# Patient Record
Sex: MALE | Race: WHITE | NOT HISPANIC OR LATINO | URBAN - METROPOLITAN AREA
[De-identification: names, ages, dates, MRNs, and addresses within clinical notes are randomized per-mention and may not be internally consistent; named-entity substitution may affect disease eponyms.]

---

## 2022-09-20 ENCOUNTER — HOSPITAL ENCOUNTER (INPATIENT)
Facility: HOSPITAL | Age: 77
LOS: 11 days | Discharge: NURSING, REHAB OR LONG-TERM CARE | DRG: 055 | End: 2022-10-01
Attending: EMERGENCY MEDICINE | Admitting: HOSPITALIST
Payer: MEDICARE

## 2022-09-20 DIAGNOSIS — R07.9 CHEST PAIN: ICD-10-CM

## 2022-09-20 DIAGNOSIS — G93.89 BRAIN MASS: ICD-10-CM

## 2022-09-20 DIAGNOSIS — L30.8 DERMATITIS ASSOCIATED WITH MOISTURE: Primary | ICD-10-CM
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
## 2022-09-20 PROBLEM — G25.81 RESTLESS LEG: Status: ACTIVE | Noted: 2022-09-20

## 2022-09-20 PROBLEM — I10 HYPERTENSION: Status: ACTIVE | Noted: 2022-09-20

## 2022-09-20 PROBLEM — Z95.1 HX OF CABG: Status: ACTIVE | Noted: 2022-09-20

## 2022-09-20 PROBLEM — M54.9 CHRONIC BACK PAIN: Status: ACTIVE | Noted: 2022-09-20

## 2022-09-20 PROBLEM — G47.33 OSA ON CPAP: Status: ACTIVE | Noted: 2022-09-20

## 2022-09-20 PROBLEM — I25.810 CORONARY ARTERY DISEASE INVOLVING CORONARY BYPASS GRAFT: Status: ACTIVE | Noted: 2022-09-20

## 2022-09-20 PROBLEM — I48.91 ATRIAL FIBRILLATION: Status: ACTIVE | Noted: 2022-09-20

## 2022-09-20 PROBLEM — J44.1 COPD EXACERBATION: Status: ACTIVE | Noted: 2022-09-20

## 2022-09-20 PROBLEM — E78.5 HYPERLIPIDEMIA: Status: ACTIVE | Noted: 2022-09-20

## 2022-09-20 PROBLEM — G89.29 CHRONIC BACK PAIN: Status: ACTIVE | Noted: 2022-09-20

## 2022-09-20 PROBLEM — K21.9 GERD (GASTROESOPHAGEAL REFLUX DISEASE): Status: ACTIVE | Noted: 2022-09-20

## 2022-09-20 PROBLEM — E11.9 TYPE 2 DIABETES MELLITUS, WITHOUT LONG-TERM CURRENT USE OF INSULIN: Status: ACTIVE | Noted: 2022-09-20

## 2022-09-20 PROBLEM — D12.6 ADENOMATOUS POLYP OF COLON: Status: ACTIVE | Noted: 2022-09-20

## 2022-09-20 PROBLEM — Z95.0 PACEMAKER: Status: ACTIVE | Noted: 2022-09-20

## 2022-09-20 PROBLEM — J44.9 COPD (CHRONIC OBSTRUCTIVE PULMONARY DISEASE): Status: ACTIVE | Noted: 2022-09-20

## 2022-09-20 LAB
ALBUMIN SERPL BCP-MCNC: 3.9 G/DL (ref 3.5–5.2)
ALP SERPL-CCNC: 46 U/L (ref 55–135)
ALT SERPL W/O P-5'-P-CCNC: 11 U/L (ref 10–44)
ANION GAP SERPL CALC-SCNC: 9 MMOL/L (ref 8–16)
APTT BLDCRRT: 37.3 SEC (ref 21–32)
AST SERPL-CCNC: 12 U/L (ref 10–40)
BASOPHILS # BLD AUTO: 0.02 K/UL (ref 0–0.2)
BASOPHILS NFR BLD: 0.2 % (ref 0–1.9)
BILIRUB SERPL-MCNC: 0.7 MG/DL (ref 0.1–1)
BUN SERPL-MCNC: 13 MG/DL (ref 8–23)
CALCIUM SERPL-MCNC: 9.5 MG/DL (ref 8.7–10.5)
CHLORIDE SERPL-SCNC: 102 MMOL/L (ref 95–110)
CO2 SERPL-SCNC: 29 MMOL/L (ref 23–29)
CREAT SERPL-MCNC: 0.8 MG/DL (ref 0.5–1.4)
DIFFERENTIAL METHOD: ABNORMAL
EOSINOPHIL # BLD AUTO: 0.2 K/UL (ref 0–0.5)
EOSINOPHIL NFR BLD: 1.7 % (ref 0–8)
ERYTHROCYTE [DISTWIDTH] IN BLOOD BY AUTOMATED COUNT: 16.7 % (ref 11.5–14.5)
EST. GFR  (NO RACE VARIABLE): >60 ML/MIN/1.73 M^2
GLUCOSE SERPL-MCNC: 112 MG/DL (ref 70–110)
HCT VFR BLD AUTO: 39.5 % (ref 40–54)
HCV AB SERPL QL IA: NORMAL
HGB BLD-MCNC: 12.9 G/DL (ref 14–18)
HIV 1+2 AB+HIV1 P24 AG SERPL QL IA: NORMAL
IMM GRANULOCYTES # BLD AUTO: 0.04 K/UL (ref 0–0.04)
IMM GRANULOCYTES NFR BLD AUTO: 0.4 % (ref 0–0.5)
INR PPP: 2.5 (ref 0.8–1.2)
LYMPHOCYTES # BLD AUTO: 1.4 K/UL (ref 1–4.8)
LYMPHOCYTES NFR BLD: 15.8 % (ref 18–48)
MCH RBC QN AUTO: 30 PG (ref 27–31)
MCHC RBC AUTO-ENTMCNC: 32.7 G/DL (ref 32–36)
MCV RBC AUTO: 92 FL (ref 82–98)
MONOCYTES # BLD AUTO: 0.7 K/UL (ref 0.3–1)
MONOCYTES NFR BLD: 8.1 % (ref 4–15)
NEUTROPHILS # BLD AUTO: 6.7 K/UL (ref 1.8–7.7)
NEUTROPHILS NFR BLD: 73.8 % (ref 38–73)
NRBC BLD-RTO: 0 /100 WBC
PLATELET # BLD AUTO: 165 K/UL (ref 150–450)
PMV BLD AUTO: 11.7 FL (ref 9.2–12.9)
POCT GLUCOSE: 113 MG/DL (ref 70–110)
POTASSIUM SERPL-SCNC: 3.4 MMOL/L (ref 3.5–5.1)
PROT SERPL-MCNC: 6.9 G/DL (ref 6–8.4)
PROTHROMBIN TIME: 24.9 SEC (ref 9–12.5)
RBC # BLD AUTO: 4.3 M/UL (ref 4.6–6.2)
SODIUM SERPL-SCNC: 140 MMOL/L (ref 136–145)
WBC # BLD AUTO: 9.06 K/UL (ref 3.9–12.7)

## 2022-09-20 PROCEDURE — 81003 URINALYSIS AUTO W/O SCOPE: CPT

## 2022-09-20 PROCEDURE — 80053 COMPREHEN METABOLIC PANEL: CPT | Performed by: EMERGENCY MEDICINE

## 2022-09-20 PROCEDURE — 99285 EMERGENCY DEPT VISIT HI MDM: CPT | Mod: 25

## 2022-09-20 PROCEDURE — 86803 HEPATITIS C AB TEST: CPT | Performed by: PHYSICIAN ASSISTANT

## 2022-09-20 PROCEDURE — 25500020 PHARM REV CODE 255: Performed by: HOSPITALIST

## 2022-09-20 PROCEDURE — 99223 1ST HOSP IP/OBS HIGH 75: CPT | Mod: ,,, | Performed by: NEUROLOGICAL SURGERY

## 2022-09-20 PROCEDURE — 99223 PR INITIAL HOSPITAL CARE,LEVL III: ICD-10-PCS | Mod: ,,, | Performed by: NEUROLOGICAL SURGERY

## 2022-09-20 PROCEDURE — 85610 PROTHROMBIN TIME: CPT | Performed by: EMERGENCY MEDICINE

## 2022-09-20 PROCEDURE — 85025 COMPLETE CBC W/AUTO DIFF WBC: CPT | Performed by: EMERGENCY MEDICINE

## 2022-09-20 PROCEDURE — 87389 HIV-1 AG W/HIV-1&-2 AB AG IA: CPT | Performed by: PHYSICIAN ASSISTANT

## 2022-09-20 PROCEDURE — 83036 HEMOGLOBIN GLYCOSYLATED A1C: CPT | Performed by: EMERGENCY MEDICINE

## 2022-09-20 PROCEDURE — 12000002 HC ACUTE/MED SURGE SEMI-PRIVATE ROOM

## 2022-09-20 PROCEDURE — 93010 EKG 12-LEAD: ICD-10-PCS | Mod: ,,, | Performed by: INTERNAL MEDICINE

## 2022-09-20 PROCEDURE — 85730 THROMBOPLASTIN TIME PARTIAL: CPT | Performed by: EMERGENCY MEDICINE

## 2022-09-20 PROCEDURE — 99285 EMERGENCY DEPT VISIT HI MDM: CPT | Mod: ,,, | Performed by: EMERGENCY MEDICINE

## 2022-09-20 PROCEDURE — 25000003 PHARM REV CODE 250

## 2022-09-20 PROCEDURE — 99285 PR EMERGENCY DEPT VISIT,LEVEL V: ICD-10-PCS | Mod: ,,, | Performed by: EMERGENCY MEDICINE

## 2022-09-20 PROCEDURE — 93010 ELECTROCARDIOGRAM REPORT: CPT | Mod: ,,, | Performed by: INTERNAL MEDICINE

## 2022-09-20 PROCEDURE — 93005 ELECTROCARDIOGRAM TRACING: CPT

## 2022-09-20 RX ORDER — MAG HYDROX/ALUMINUM HYD/SIMETH 200-200-20
30 SUSPENSION, ORAL (FINAL DOSE FORM) ORAL 4 TIMES DAILY PRN
Status: DISCONTINUED | OUTPATIENT
Start: 2022-09-20 | End: 2022-10-01 | Stop reason: HOSPADM

## 2022-09-20 RX ORDER — POTASSIUM CHLORIDE 20 MEQ/1
40 TABLET, EXTENDED RELEASE ORAL ONCE
Status: COMPLETED | OUTPATIENT
Start: 2022-09-20 | End: 2022-09-20

## 2022-09-20 RX ORDER — ATORVASTATIN CALCIUM 20 MG/1
40 TABLET, FILM COATED ORAL NIGHTLY
Status: DISCONTINUED | OUTPATIENT
Start: 2022-09-20 | End: 2022-10-01 | Stop reason: HOSPADM

## 2022-09-20 RX ORDER — ACETAMINOPHEN 325 MG/1
650 TABLET ORAL EVERY 4 HOURS PRN
Status: DISCONTINUED | OUTPATIENT
Start: 2022-09-20 | End: 2022-09-22

## 2022-09-20 RX ORDER — IPRATROPIUM BROMIDE AND ALBUTEROL SULFATE 2.5; .5 MG/3ML; MG/3ML
3 SOLUTION RESPIRATORY (INHALATION) EVERY 6 HOURS PRN
Status: DISCONTINUED | OUTPATIENT
Start: 2022-09-20 | End: 2022-10-01 | Stop reason: HOSPADM

## 2022-09-20 RX ORDER — SODIUM CHLORIDE 0.9 % (FLUSH) 0.9 %
10 SYRINGE (ML) INJECTION
Status: DISCONTINUED | OUTPATIENT
Start: 2022-09-20 | End: 2022-10-01 | Stop reason: HOSPADM

## 2022-09-20 RX ORDER — IBUPROFEN 200 MG
1 TABLET ORAL DAILY
Status: DISCONTINUED | OUTPATIENT
Start: 2022-09-21 | End: 2022-10-01 | Stop reason: HOSPADM

## 2022-09-20 RX ORDER — AMOXICILLIN 250 MG
1 CAPSULE ORAL 2 TIMES DAILY PRN
Status: DISCONTINUED | OUTPATIENT
Start: 2022-09-20 | End: 2022-09-25

## 2022-09-20 RX ORDER — IBUPROFEN 200 MG
24 TABLET ORAL
Status: DISCONTINUED | OUTPATIENT
Start: 2022-09-20 | End: 2022-10-01 | Stop reason: HOSPADM

## 2022-09-20 RX ORDER — PANTOPRAZOLE SODIUM 40 MG/1
40 TABLET, DELAYED RELEASE ORAL DAILY
Status: DISCONTINUED | OUTPATIENT
Start: 2022-09-21 | End: 2022-10-01 | Stop reason: HOSPADM

## 2022-09-20 RX ORDER — ATENOLOL 50 MG/1
50 TABLET ORAL DAILY
Status: DISCONTINUED | OUTPATIENT
Start: 2022-09-21 | End: 2022-10-01 | Stop reason: HOSPADM

## 2022-09-20 RX ORDER — ASPIRIN 81 MG/1
81 TABLET ORAL DAILY
Status: DISCONTINUED | OUTPATIENT
Start: 2022-09-21 | End: 2022-10-01 | Stop reason: HOSPADM

## 2022-09-20 RX ORDER — SODIUM CHLORIDE 0.9 % (FLUSH) 0.9 %
10 SYRINGE (ML) INJECTION EVERY 12 HOURS PRN
Status: DISCONTINUED | OUTPATIENT
Start: 2022-09-20 | End: 2022-10-01 | Stop reason: HOSPADM

## 2022-09-20 RX ORDER — FLUTICASONE FUROATE AND VILANTEROL 100; 25 UG/1; UG/1
1 POWDER RESPIRATORY (INHALATION) DAILY
Status: DISCONTINUED | OUTPATIENT
Start: 2022-09-21 | End: 2022-10-01 | Stop reason: HOSPADM

## 2022-09-20 RX ORDER — FUROSEMIDE 20 MG/1
20 TABLET ORAL DAILY
Status: DISCONTINUED | OUTPATIENT
Start: 2022-09-21 | End: 2022-10-01 | Stop reason: HOSPADM

## 2022-09-20 RX ORDER — ROPINIROLE 1 MG/1
1 TABLET, FILM COATED ORAL NIGHTLY
Status: DISCONTINUED | OUTPATIENT
Start: 2022-09-20 | End: 2022-10-01 | Stop reason: HOSPADM

## 2022-09-20 RX ORDER — INSULIN ASPART 100 [IU]/ML
1-10 INJECTION, SOLUTION INTRAVENOUS; SUBCUTANEOUS
Status: DISCONTINUED | OUTPATIENT
Start: 2022-09-20 | End: 2022-10-01 | Stop reason: HOSPADM

## 2022-09-20 RX ORDER — GLUCAGON 1 MG
1 KIT INJECTION
Status: DISCONTINUED | OUTPATIENT
Start: 2022-09-20 | End: 2022-10-01 | Stop reason: HOSPADM

## 2022-09-20 RX ORDER — LOSARTAN POTASSIUM AND HYDROCHLOROTHIAZIDE 25; 100 MG/1; MG/1
1 TABLET ORAL DAILY
Status: DISCONTINUED | OUTPATIENT
Start: 2022-09-21 | End: 2022-09-30

## 2022-09-20 RX ORDER — NALOXONE HCL 0.4 MG/ML
0.02 VIAL (ML) INJECTION
Status: DISCONTINUED | OUTPATIENT
Start: 2022-09-20 | End: 2022-10-01 | Stop reason: HOSPADM

## 2022-09-20 RX ORDER — POLYETHYLENE GLYCOL 3350 17 G/17G
17 POWDER, FOR SOLUTION ORAL 2 TIMES DAILY PRN
Status: DISCONTINUED | OUTPATIENT
Start: 2022-09-20 | End: 2022-09-25

## 2022-09-20 RX ORDER — WARFARIN SODIUM 5 MG/1
5 TABLET ORAL DAILY
Status: DISCONTINUED | OUTPATIENT
Start: 2022-09-21 | End: 2022-09-22

## 2022-09-20 RX ORDER — IBUPROFEN 200 MG
16 TABLET ORAL
Status: DISCONTINUED | OUTPATIENT
Start: 2022-09-20 | End: 2022-10-01 | Stop reason: HOSPADM

## 2022-09-20 RX ORDER — TALC
6 POWDER (GRAM) TOPICAL NIGHTLY PRN
Status: DISCONTINUED | OUTPATIENT
Start: 2022-09-20 | End: 2022-10-01 | Stop reason: HOSPADM

## 2022-09-20 RX ADMIN — IOHEXOL 100 ML: 350 INJECTION, SOLUTION INTRAVENOUS at 09:09

## 2022-09-20 RX ADMIN — INSULIN DETEMIR 5 UNITS: 100 INJECTION, SOLUTION SUBCUTANEOUS at 11:09

## 2022-09-20 RX ADMIN — POTASSIUM CHLORIDE 40 MEQ: 1500 TABLET, EXTENDED RELEASE ORAL at 09:09

## 2022-09-20 RX ADMIN — ATORVASTATIN CALCIUM 40 MG: 40 TABLET, FILM COATED ORAL at 11:09

## 2022-09-20 NOTE — ED PROVIDER NOTES
Encounter Date: 9/20/2022       History     Chief Complaint   Patient presents with    Fall     Transfer from [place], frequent falls. CT revealed brain mass      HPI  78 y/o M medical history of HTN, DM2, CAD s/p CABG, pacemaker and h/o polio presents as transfer from [place] for concern of brain mass. Pt states he has been having frequent falls over the past few weeks. Has been using a walker. This morning he fell again and was seen in ED at [facility] where head CT revealed concern for brain mass.   Pt has a complaint of headache but other wise feels well.  Has at baseline problems with mobility of his lt arm secondary to polio    Review of patient's allergies indicates:  No Known Allergies    Past Medical History:   Diagnosis Date    Coronary artery disease     Diabetes mellitus     GERD (gastroesophageal reflux disease)     Hypertension      Past Surgical History:   Procedure Laterality Date    APPENDECTOMY      CARDIAC SURGERY      JOINT REPLACEMENT       History reviewed. No pertinent family history.  Social History     Tobacco Use    Smoking status: Every Day     Packs/day: 1.00     Years: 30.00     Pack years: 30.00     Types: Cigarettes    Smokeless tobacco: Never   Substance Use Topics    Alcohol use: Never    Drug use: Never     Review of Systems   Constitutional:  Negative for fatigue and fever.   HENT:  Negative for sore throat.    Eyes:  Negative for visual disturbance.   Respiratory:  Negative for shortness of breath.    Cardiovascular:  Negative for chest pain and leg swelling.   Gastrointestinal:  Negative for abdominal pain, nausea and vomiting.   Genitourinary:  Negative for dysuria.   Musculoskeletal:  Negative for back pain and neck stiffness.   Skin:  Negative for rash.   Neurological:  Positive for weakness and headaches. Negative for dizziness, seizures and syncope.     Physical Exam     Initial Vitals [09/20/22 1456]   BP Pulse Resp Temp SpO2   (!) 148/79 71 20 97.8 °F (36.6 °C) 96 %       MAP       --         Physical Exam    Nursing note and vitals reviewed.  Constitutional: He appears well-developed and well-nourished. He is not diaphoretic. No distress.   HENT:   Head: Normocephalic and atraumatic.   Eyes: Conjunctivae are normal. Pupils are equal, round, and reactive to light.   Neck: Neck supple.   Cardiovascular:  Normal rate, regular rhythm, normal heart sounds and intact distal pulses.           Pulmonary/Chest: Breath sounds normal. No respiratory distress. He has no wheezes.   Well healed scar overlying pacemaker Rt chest wall   Abdominal: Abdomen is soft. He exhibits no distension. There is no abdominal tenderness. There is no rebound.   Musculoskeletal:         General: No tenderness or edema.      Cervical back: Neck supple.     Neurological: GCS score is 15. GCS eye subscore is 4. GCS verbal subscore is 5. GCS motor subscore is 6.   Alert, oriented to person, hospital, year and month  Moving all extremities, normal strength in bilateral LE   Skin: Skin is warm and dry.       ED Course   Procedures  Labs Reviewed   CBC W/ AUTO DIFFERENTIAL - Abnormal; Notable for the following components:       Result Value    RBC 4.30 (*)     Hemoglobin 12.9 (*)     Hematocrit 39.5 (*)     RDW 16.7 (*)     Gran % 73.8 (*)     Lymph % 15.8 (*)     All other components within normal limits   COMPREHENSIVE METABOLIC PANEL - Abnormal; Notable for the following components:    Potassium 3.4 (*)     Glucose 112 (*)     Alkaline Phosphatase 46 (*)     All other components within normal limits   PROTIME-INR - Abnormal; Notable for the following components:    Prothrombin Time 24.9 (*)     INR 2.5 (*)     All other components within normal limits   APTT - Abnormal; Notable for the following components:    aPTT 37.3 (*)     All other components within normal limits   POCT GLUCOSE - Abnormal; Notable for the following components:    POCT Glucose 113 (*)     All other components within normal limits   HIV 1 / 2  ANTIBODY    Narrative:     Release to patient->Immediate   HEPATITIS C ANTIBODY    Narrative:     Release to patient->Immediate   POCT GLUCOSE MONITORING CONTINUOUS     EKG Readings: (Independently Interpreted)   Aflutter rate 80, no acute ischemic changes     Imaging Results              CT Head Without Contrast (Final result)  Result time 09/20/22 18:08:13      Final result by Chinedu Burton MD (09/20/22 18:08:13)                   Impression:      1. No acute intracranial process.  2. Involutional changes with chronic microvascular ischemic changes.      Electronically signed by: Chinedu Burton  Date:    09/20/2022  Time:    18:08               Narrative:    EXAMINATION:  CT HEAD WITHOUT CONTRAST    CLINICAL HISTORY:  Headache, new or worsening (Age >= 50y);    TECHNIQUE:  Low dose axial CT images obtained throughout the head without intravenous contrast. Sagittal and coronal reconstructions were performed.    COMPARISON:  None.    FINDINGS:  Intracranial compartment:    Ventricles and sulci are normal in size for age without evidence of hydrocephalus. No extra-axial blood or fluid collections.    Moderate involutional changes and probable chronic microvascular ischemic changes in the periventricular white matter.    No parenchymal mass, hemorrhage, edema or major vascular distribution infarct.    Skull/extracranial contents (limited evaluation): No fracture. Mastoid air cells and paranasal sinuses are essentially clear.                                       X-Ray Chest 1 View (Final result)  Result time 09/20/22 17:21:39      Final result by Ezio Avila MD (09/20/22 17:21:39)                   Impression:      No acute cardiopulmonary process.    Electronically signed by resident: Osman Mcbride  Date:    09/20/2022  Time:    17:15    Electronically signed by: Ezio Avila MD  Date:    09/20/2022  Time:    17:21               Narrative:    EXAMINATION:  XR CHEST 1 VIEW    CLINICAL HISTORY:  Unspecified fall,  initial encounter    TECHNIQUE:  Single frontal view of the chest was performed.    COMPARISON:  None    FINDINGS:  Right chest pacemaker with 2 transvenous leads.    Mediastinal structures midline.  Cardiac silhouette normal.  Postoperative change of median sternotomy/CABG.    Lungs are symmetrically well expanded and clear.  No pleural fluid or pneumothorax.    No significant osseous abnormality.                                    X-Rays:   Independently Interpreted Readings:   Chest X-Ray: Normal heart size.  No infiltrates.  No acute abnormalities. Pacemaker rt chest wall     Medications - No data to display    Medical Decision Making:   History:   I obtained history from: someone other than patient.  Old Medical Records: I decided to obtain old medical records.  Initial Assessment:   78 y/o M with frequent falls and brain mass on CT  Routine blood work  Consult neurosurg  Discussed MRI but pt has pacemaker unclear if MR compatible  Independently Interpreted Test(s):   I have ordered and independently interpreted X-rays - see prior notes.  I have ordered and independently interpreted EKG Reading(s) - see prior notes  Clinical Tests:   Lab Tests: Ordered  Radiological Study: Ordered  Medical Tests: Ordered and Reviewed  ED Management:  Discussed with neurosurg. Feel likely metastasis. Will order CTchest/abd/pelvis and CT brain. Admit to medicine                        Clinical Impression:   Final diagnoses:  [W19.XXXA] Falls        ED Disposition Condition    Observation Stable                Kayli Stewart MD  09/22/22 3778

## 2022-09-20 NOTE — Clinical Note
Diagnosis: Brain mass [506238]   Admitting Provider:: MARCELINO ZHU [[id number]]   Future Attending Provider: MARCELINO ZHU [[id number]]   Reason for IP Medical Treatment  (Clinical interventions that can only be accomplished in the IP setting? ) :: further eval of brain mass   Estimated Length of Stay:: 2 midnights   I certify that Inpatient services for greater than or equal to 2 midnights are medically necessary:: Yes   Plans for Post-Acute care--if anticipated (pick the single best option):: H. Transfer to other Acute Care (Psych, another hospital, etc.)

## 2022-09-20 NOTE — ED TRIAGE NOTES
Pt transferred from [facility] ED for neurosurgery consult.  Pt was seen for frequent falls and was found to have a brain mass on CT scan.   Pt c/o headache at present which he states has been intermittent for a few weeks.  Pt also c/o left leg pain.

## 2022-09-20 NOTE — ED NOTES
Appearance:  Pt awake, alert & oriented to person, place & time.  Pt in no acute distress at present time.  Skin:  Skin warm, dry & intact.  Mucous membranes moist.  Skin turgor normal.  Respiratory:  Respirations even, non-labored.    Neurologic:  Pt moving all extremities without difficulty.  Sensation intact.     Peripheral Vascular:  All peripheral pulses present.  Abdomen:  Abdomen soft, non-tender to palpation.

## 2022-09-21 LAB
ALBUMIN SERPL BCP-MCNC: 3.8 G/DL (ref 3.5–5.2)
ALP SERPL-CCNC: 45 U/L (ref 55–135)
ALT SERPL W/O P-5'-P-CCNC: 8 U/L (ref 10–44)
ANION GAP SERPL CALC-SCNC: 11 MMOL/L (ref 8–16)
AST SERPL-CCNC: 12 U/L (ref 10–40)
BASOPHILS # BLD AUTO: 0.02 K/UL (ref 0–0.2)
BASOPHILS NFR BLD: 0.2 % (ref 0–1.9)
BILIRUB SERPL-MCNC: 1.1 MG/DL (ref 0.1–1)
BILIRUB UR QL STRIP: NEGATIVE
BUN SERPL-MCNC: 10 MG/DL (ref 8–23)
CALCIUM SERPL-MCNC: 9.3 MG/DL (ref 8.7–10.5)
CHLORIDE SERPL-SCNC: 102 MMOL/L (ref 95–110)
CLARITY UR REFRACT.AUTO: CLEAR
CO2 SERPL-SCNC: 24 MMOL/L (ref 23–29)
COLOR UR AUTO: YELLOW
CREAT SERPL-MCNC: 0.7 MG/DL (ref 0.5–1.4)
DIFFERENTIAL METHOD: ABNORMAL
EOSINOPHIL # BLD AUTO: 0.2 K/UL (ref 0–0.5)
EOSINOPHIL NFR BLD: 1.7 % (ref 0–8)
ERYTHROCYTE [DISTWIDTH] IN BLOOD BY AUTOMATED COUNT: 16.2 % (ref 11.5–14.5)
EST. GFR  (NO RACE VARIABLE): >60 ML/MIN/1.73 M^2
ESTIMATED AVG GLUCOSE: 146 MG/DL (ref 68–131)
GLUCOSE SERPL-MCNC: 108 MG/DL (ref 70–110)
GLUCOSE UR QL STRIP: NEGATIVE
HBA1C MFR BLD: 6.7 % (ref 4–5.6)
HCT VFR BLD AUTO: 39.1 % (ref 40–54)
HGB BLD-MCNC: 12.7 G/DL (ref 14–18)
HGB UR QL STRIP: NEGATIVE
IMM GRANULOCYTES # BLD AUTO: 0.02 K/UL (ref 0–0.04)
IMM GRANULOCYTES NFR BLD AUTO: 0.2 % (ref 0–0.5)
INR PPP: 2.5 (ref 0.8–1.2)
KETONES UR QL STRIP: ABNORMAL
LEUKOCYTE ESTERASE UR QL STRIP: NEGATIVE
LYMPHOCYTES # BLD AUTO: 1.3 K/UL (ref 1–4.8)
LYMPHOCYTES NFR BLD: 14.3 % (ref 18–48)
MAGNESIUM SERPL-MCNC: 1.6 MG/DL (ref 1.6–2.6)
MCH RBC QN AUTO: 29.5 PG (ref 27–31)
MCHC RBC AUTO-ENTMCNC: 32.5 G/DL (ref 32–36)
MCV RBC AUTO: 91 FL (ref 82–98)
MONOCYTES # BLD AUTO: 0.8 K/UL (ref 0.3–1)
MONOCYTES NFR BLD: 8.2 % (ref 4–15)
NEUTROPHILS # BLD AUTO: 6.9 K/UL (ref 1.8–7.7)
NEUTROPHILS NFR BLD: 75.4 % (ref 38–73)
NITRITE UR QL STRIP: NEGATIVE
NRBC BLD-RTO: 0 /100 WBC
PH UR STRIP: 8 [PH] (ref 5–8)
PHOSPHATE SERPL-MCNC: 3.2 MG/DL (ref 2.7–4.5)
PLATELET # BLD AUTO: 164 K/UL (ref 150–450)
PMV BLD AUTO: 11.6 FL (ref 9.2–12.9)
POCT GLUCOSE: 112 MG/DL (ref 70–110)
POCT GLUCOSE: 113 MG/DL (ref 70–110)
POCT GLUCOSE: 115 MG/DL (ref 70–110)
POCT GLUCOSE: 120 MG/DL (ref 70–110)
POCT GLUCOSE: 122 MG/DL (ref 70–110)
POCT GLUCOSE: 130 MG/DL (ref 70–110)
POTASSIUM SERPL-SCNC: 3.8 MMOL/L (ref 3.5–5.1)
PROT SERPL-MCNC: 6.7 G/DL (ref 6–8.4)
PROT UR QL STRIP: NEGATIVE
PROTHROMBIN TIME: 24.6 SEC (ref 9–12.5)
RBC # BLD AUTO: 4.3 M/UL (ref 4.6–6.2)
SODIUM SERPL-SCNC: 137 MMOL/L (ref 136–145)
SP GR UR STRIP: >1.03 (ref 1–1.03)
URN SPEC COLLECT METH UR: ABNORMAL
WBC # BLD AUTO: 9.15 K/UL (ref 3.9–12.7)

## 2022-09-21 PROCEDURE — 27000221 HC OXYGEN, UP TO 24 HOURS

## 2022-09-21 PROCEDURE — 85610 PROTHROMBIN TIME: CPT

## 2022-09-21 PROCEDURE — 25000003 PHARM REV CODE 250

## 2022-09-21 PROCEDURE — 85025 COMPLETE CBC W/AUTO DIFF WBC: CPT

## 2022-09-21 PROCEDURE — 99900035 HC TECH TIME PER 15 MIN (STAT)

## 2022-09-21 PROCEDURE — 25000003 PHARM REV CODE 250: Performed by: EMERGENCY MEDICINE

## 2022-09-21 PROCEDURE — S0166 INJ OLANZAPINE 2.5MG: HCPCS

## 2022-09-21 PROCEDURE — 94640 AIRWAY INHALATION TREATMENT: CPT

## 2022-09-21 PROCEDURE — 27000190 HC CPAP FULL FACE MASK W/VALVE

## 2022-09-21 PROCEDURE — 25000242 PHARM REV CODE 250 ALT 637 W/ HCPCS

## 2022-09-21 PROCEDURE — 94761 N-INVAS EAR/PLS OXIMETRY MLT: CPT

## 2022-09-21 PROCEDURE — 83735 ASSAY OF MAGNESIUM: CPT

## 2022-09-21 PROCEDURE — S4991 NICOTINE PATCH NONLEGEND: HCPCS

## 2022-09-21 PROCEDURE — 94660 CPAP INITIATION&MGMT: CPT

## 2022-09-21 PROCEDURE — 80053 COMPREHEN METABOLIC PANEL: CPT

## 2022-09-21 PROCEDURE — 11000001 HC ACUTE MED/SURG PRIVATE ROOM

## 2022-09-21 PROCEDURE — 84100 ASSAY OF PHOSPHORUS: CPT

## 2022-09-21 RX ORDER — OLANZAPINE 5 MG/1
5 TABLET ORAL ONCE
Status: DISCONTINUED | OUTPATIENT
Start: 2022-09-21 | End: 2022-09-21

## 2022-09-21 RX ORDER — OLANZAPINE 2.5 MG/1
5 TABLET ORAL DAILY
Status: DISCONTINUED | OUTPATIENT
Start: 2022-09-21 | End: 2022-09-21

## 2022-09-21 RX ORDER — HYDROXYZINE HYDROCHLORIDE 25 MG/1
25 TABLET, FILM COATED ORAL 3 TIMES DAILY PRN
Status: DISCONTINUED | OUTPATIENT
Start: 2022-09-21 | End: 2022-10-01 | Stop reason: HOSPADM

## 2022-09-21 RX ORDER — OLANZAPINE 2.5 MG/1
5 TABLET ORAL DAILY
Status: DISCONTINUED | OUTPATIENT
Start: 2022-09-22 | End: 2022-09-21

## 2022-09-21 RX ORDER — OLANZAPINE 10 MG/2ML
10 INJECTION, POWDER, FOR SOLUTION INTRAMUSCULAR ONCE AS NEEDED
Status: COMPLETED | OUTPATIENT
Start: 2022-09-21 | End: 2022-09-21

## 2022-09-21 RX ORDER — OLANZAPINE 2.5 MG/1
5 TABLET ORAL ONCE
Status: COMPLETED | OUTPATIENT
Start: 2022-09-21 | End: 2022-09-21

## 2022-09-21 RX ORDER — OLANZAPINE 2.5 MG/1
10 TABLET ORAL DAILY
Status: DISCONTINUED | OUTPATIENT
Start: 2022-09-21 | End: 2022-09-25

## 2022-09-21 RX ADMIN — FLUTICASONE FUROATE AND VILANTEROL TRIFENATATE 1 PUFF: 100; 25 POWDER RESPIRATORY (INHALATION) at 10:09

## 2022-09-21 RX ADMIN — OLANZAPINE 10 MG: 10 INJECTION, POWDER, LYOPHILIZED, FOR SOLUTION INTRAMUSCULAR at 04:09

## 2022-09-21 RX ADMIN — ACETAMINOPHEN 650 MG: 325 TABLET ORAL at 01:09

## 2022-09-21 RX ADMIN — LOSARTAN POTASSIUM AND HYDROCHLOROTHIAZIDE 1 TABLET: 100; 25 TABLET, FILM COATED ORAL at 08:09

## 2022-09-21 RX ADMIN — ROPINIROLE HYDROCHLORIDE 1 MG: 1 TABLET, FILM COATED ORAL at 12:09

## 2022-09-21 RX ADMIN — Medication 1 PATCH: at 08:09

## 2022-09-21 RX ADMIN — ATENOLOL 50 MG: 25 TABLET ORAL at 08:09

## 2022-09-21 RX ADMIN — WARFARIN SODIUM 5 MG: 5 TABLET ORAL at 06:09

## 2022-09-21 RX ADMIN — INSULIN DETEMIR 5 UNITS: 100 INJECTION, SOLUTION SUBCUTANEOUS at 09:09

## 2022-09-21 RX ADMIN — ROPINIROLE HYDROCHLORIDE 1 MG: 1 TABLET, FILM COATED ORAL at 08:09

## 2022-09-21 RX ADMIN — MELATONIN TAB 3 MG 6 MG: 3 TAB at 08:09

## 2022-09-21 RX ADMIN — ATORVASTATIN CALCIUM 40 MG: 40 TABLET, FILM COATED ORAL at 08:09

## 2022-09-21 RX ADMIN — HYDROXYZINE HYDROCHLORIDE 25 MG: 25 TABLET, FILM COATED ORAL at 08:09

## 2022-09-21 RX ADMIN — ASPIRIN 81 MG: 81 TABLET, COATED ORAL at 08:09

## 2022-09-21 RX ADMIN — OLANZAPINE 10 MG: 2.5 TABLET, FILM COATED ORAL at 11:09

## 2022-09-21 RX ADMIN — ACETAMINOPHEN 650 MG: 325 TABLET ORAL at 08:09

## 2022-09-21 RX ADMIN — PANTOPRAZOLE SODIUM 40 MG: 40 TABLET, DELAYED RELEASE ORAL at 08:09

## 2022-09-21 RX ADMIN — OLANZAPINE 5 MG: 2.5 TABLET, FILM COATED ORAL at 11:09

## 2022-09-21 RX ADMIN — FUROSEMIDE 20 MG: 20 TABLET ORAL at 08:09

## 2022-09-21 NOTE — ED NOTES
Gave report Philly whom will be assuming care of patient in room 925. Ambreen to send tele box to ED for placement on patient before transport.

## 2022-09-21 NOTE — ASSESSMENT & PLAN NOTE
Patient's FSGs are controlled on current medication regimen.  Last A1c reviewed- No results found for: LABA1C, HGBA1C  Most recent fingerstick glucose reviewed- Recent Labs   Lab 09/20/22  1603   POCTGLUCOSE 113*     Current correctional scale  Medium  Maintain anti-hyperglycemic dose as follows-   Antihyperglycemics (From admission, onward)    None      Home medications: metformin, pioglitazone, Victoza    -- Hold oral hyperglycemics  -- SSI, POCT BG qACHS; titrate basal/bolus regimen PRN to 140-180 goal

## 2022-09-21 NOTE — SUBJECTIVE & OBJECTIVE
Past Medical History:   Diagnosis Date    Coronary artery disease     Diabetes mellitus     GERD (gastroesophageal reflux disease)     Hypertension        Past Surgical History:   Procedure Laterality Date    APPENDECTOMY      CARDIAC SURGERY      JOINT REPLACEMENT         Review of patient's allergies indicates:  No Known Allergies    No current facility-administered medications on file prior to encounter.     No current outpatient medications on file prior to encounter.     Family History    None       Tobacco Use    Smoking status: Every Day     Packs/day: 1.00     Years: 30.00     Pack years: 30.00     Types: Cigarettes    Smokeless tobacco: Never   Substance and Sexual Activity    Alcohol use: Never    Drug use: Never    Sexual activity: Not on file     Review of Systems   Constitutional:  Negative for chills, diaphoresis and fever.   HENT:  Negative for rhinorrhea, sneezing, sore throat and trouble swallowing.    Respiratory:  Negative for cough and shortness of breath.    Cardiovascular:  Negative for chest pain and palpitations.   Gastrointestinal:  Negative for constipation, diarrhea, nausea and vomiting.   Genitourinary:  Negative for dysuria, frequency and urgency.   Musculoskeletal:  Negative for arthralgias and myalgias.   Skin:  Negative for rash and wound.   Neurological:  Negative for syncope, facial asymmetry, speech difficulty, weakness, light-headedness and headaches.   Psychiatric/Behavioral:  Positive for confusion and decreased concentration. Negative for agitation.         Memory problems   Objective:     Vital Signs (Most Recent):  Temp: 97.8 °F (36.6 °C) (09/20/22 1456)  Pulse: 82 (09/20/22 1907)  Resp: 20 (09/20/22 1456)  BP: 133/68 (09/20/22 1906)  SpO2: 96 % (09/20/22 1907) Vital Signs (24h Range):  Temp:  [97.8 °F (36.6 °C)] 97.8 °F (36.6 °C)  Pulse:  [71-82] 82  Resp:  [20] 20  SpO2:  [96 %] 96 %  BP: (133-148)/(68-79) 133/68     Weight: 86.2 kg (190 lb)  Body mass index is 25.77  kg/m².    Physical Exam  Constitutional:       Appearance: Normal appearance. He is obese.   HENT:      Head: Normocephalic.   Eyes:      Extraocular Movements: Extraocular movements intact.      Pupils: Pupils are equal, round, and reactive to light.   Cardiovascular:      Rate and Rhythm: Normal rate and regular rhythm.      Pulses: Normal pulses.      Heart sounds: Normal heart sounds. No murmur heard.    No friction rub. No gallop.   Pulmonary:      Effort: Pulmonary effort is normal. No respiratory distress.      Breath sounds: No rales.   Chest:      Chest wall: No tenderness.   Abdominal:      General: Abdomen is flat.      Palpations: Abdomen is soft.      Tenderness: There is no abdominal tenderness. There is no guarding or rebound.   Musculoskeletal:      Cervical back: Normal range of motion and neck supple.      Right lower leg: No edema.      Left lower leg: No edema.   Skin:     General: Skin is warm and dry.   Neurological:      General: No focal deficit present.      Mental Status: He is alert.      Comments: Oriented to person, time. Poor memory recall. Unable to provide history of recent illness.          CRANIAL NERVES     CN III, IV, VI   Pupils are equal, round, and reactive to light.     Significant Labs: All pertinent labs within the past 24 hours have been reviewed.    Significant Imaging: I have reviewed all pertinent imaging results/findings within the past 24 hours.   normal...

## 2022-09-21 NOTE — PLAN OF CARE
Admit Date: 9/20/2022  3:27 PM   Admission diagnosis: Brain mass [G93.89]  Chest pain [R07.9]  Falls [W19.XXXA]   PCP: Sony Holt DO  Insurance: Payor: MEDICARE / Plan: MEDICARE PART A & B / Product Type: Government /    Extended Emergency Contact Information  Primary Emergency Contact: Donna Styles  Home Phone: [phone]  Mobile Phone: [phone]  Relation: Spouse   needed? No     [id number] - [place], [place] - [address]  [address] [place] [address]  Phone: [phone] Fax: [phone]     Past Medical History:   Diagnosis Date    Coronary artery disease     Diabetes mellitus     GERD (gastroesophageal reflux disease)     Hypertension        09/21/22 1516   Discharge Assessment   Assessment Type Discharge Planning Assessment   Confirmed/corrected address, phone number and insurance Yes   Confirmed Demographics Contacted registration to update   Source of Information family   If unable to respond/provide information was family/caregiver contacted? Yes   Contact Name/Number Donna Styles (Spouse)   [phone] (Mobile)   Communicated ANITA with patient/caregiver Date not available/Unable to determine   Reason For Admission Brain mass   Lives With spouse   Facility Arrived From: [facility]   Walking or Climbing Stairs Difficulty ambulation difficulty, requires equipment   Mobility Management cane, walker   Dressing/Bathing Difficulty none   Home Accessibility stairs to enter home   Number of Stairs, Main Entrance one   Home Layout Able to live on 1st floor   Equipment Currently Used at Home other (see comments)  (see note)   Readmission within 30 days? No   Do you have any problems affording any of your prescribed medications? No   Who is going to help you get home at discharge? Pt's wife stated that they will need transportation arranged.   How do you get to doctors appointments? family or friend will provide   Are you on dialysis?  No   Do you take coumadin? Yes   Who monitors your labs? Dr. Juan Jose Summers [phone]   Discharge Plan A Other  (TBD)   Discharge Plan B Skilled Nursing Facility   Discharge Plan discussed with: Spouse/sig other   Relationship/Environment   Name(s) of Who Lives With Patient Donna Styles (Spouse)   [phone] (Mobile)        met with patient and his wife Donna to discuss CM role and to assess for needs at discharge.  All information was gathered from pt's wife.   Verified , contact information, address, and insurance from [employer]. Patient was transferred from [facility].    Patient's wife reported that he lives with her in a single story home with 1 step(s) to enter.  Prior to hospital admission, patient was driving, independent with ADL's (up until 2 weeks prior to hospitalization), and used assistive devices for mobility (see DME below). Patient's wife also stated that he is not on dialysis. She reported that he does take coumadin and is followed by Dr. Juan Jose Summers [phone] for labs.  Patient's wife stated that she will need transportation arranged to bring him back home upon discharge.    Durable medical equipment:   walker   Bedside commode  CPAP     Discharge Planning Booklet given to patient and discussed.  All questions addressed.  Case management will continue to follow and assist with discharge needs as needed.    Mari Hastings   RN Case Manager  [facility]  2022

## 2022-09-21 NOTE — PLAN OF CARE
0839  Pt given meds per mar. Restraints removed for skin assessment and for rom exercise. Restraints replaced as pt tried to pull out IV access.     0900  Pt screaming and yelling and trying to take off restraints. Tried reorienting pt as he is stating that he is at home. Unable to reorient pt.     1006:  Pt screaming and yelling and becoming increasingly agitated. This nurse tried reorienting pt but to no avail. Primary team notified.

## 2022-09-21 NOTE — ASSESSMENT & PLAN NOTE
Chronic anticoagulation use w/ warfarin  S/P pacemaker with multiple replacements    Home meds: Warfarin 7.5 qd, atenolol 50 qd  [id number]      -- Continue home medications  -- Anticoagulation with home warfarin dose; titrate to maintain INR goal 2-3  -- Daily INR  -- Cardiac telemetry  -- Maintain K > 4, Mg > 2, Ca wnl; replete PRN  -- STAT cardiology consult if hemodynamic instability for DCCV evaluation

## 2022-09-21 NOTE — HPI
"76 yo PMHx DM2, CAD s/p CABG x 2 1997, history of adenomatous colon polyps (07/2017) revealing 10 mm sessile serrated adenoma, GERD, multiple pacemaker replacements most recent 2021 for AF (on warfarin), HTN, HLD, chronic back pain, MARQUIS on CPAP presenting to [facility] ED from [facility] due to a 2 week history of sudden memory deficits, disorientation, confsuion (date, situation, short-term memory), and multiple falls w/o head trauma or LOC or seizure. Wife noticed that he was forgetting where he was, what he had been doing. In addition, he has been getting weaker and more unstable on his feet. Yesterday evening, he fell while going to the bathroom and hit his elbow. Transported to EMS to [facility], where they did a CT head 09/20 this AM. Was discharged from [facility] because they initially told the wife that the CT scan was "clear", wife brought him to PCP and cardiologist OP office later that morning, but was called back by [facility] stating that they had mis-read the CT head and that the CT head was remarkable for apparent mass in brain.  Patient was subsequently transferred via EMS to [facility].  [facility] staff informed the wife that they were concerned for possible cancer, and wanted him to see neurosurgery evaluation.  Patient wife denies history cancer to her knowledge.  Patient is an active smoker smoking 1-2 packs per day, and has smoked for the last 30 years.  Social alcohol use, no other illicit substances.    On my initial evaluation, patient is conversational; however, he is only oriented to self and time.  He exhibits short-term memory loss, and is not sure why he is here.  Overall, patient is a poor historian.  Family was not at bedside.  This history of present illness was obtained via telephone conversation with his wife ([phone] Donna Styles).  ROS includes denies chest pain, abdominal pain, nausea, vomiting, shortness of " breath, cough, diarrhea, constipation, blood per rectum, fevers, chills, muscle aches, new weakness, new sensory changes.  Patient reports history of chronic lower back pain.    ED course:  Neurosurgery evaluated at [facility], concerns for metastasis.  No plans for surgical intervention at this time.  Subsequently pan CT scan pending.    PMHx/PSHx:  DM2, CAD s/p CABG x 2 , multiple pacemaker replacements most recent  for AF (on warfarin), HTN, HLD, chronic back pain, MARQUIS on CPAP    FMHx: Mother with breast cancer, . Sister NAFLD w/ cirrhosis, .

## 2022-09-21 NOTE — ED NOTES
Patient becoming increasingly agitated again after waking up from a short period of resting with eyes closed. Re informed patient of reasons we had to place patient in restraints. Patient began using profanity and trying to remove medical equipment.

## 2022-09-21 NOTE — PROGRESS NOTES
09/21/22 1000   WOCN Assessment   WOCN Total Time (mins) 10   Visit Date 09/21/22   Visit Time 1000   Consult Type New   WOCN Speciality Wound   Intervention assessed;changed;applied;chart review;coordination of care;orders        Altered Skin Integrity 09/21/22 1000 Sacral spine   Date First Assessed/Time First Assessed: 09/21/22 1000   Location: Sacral spine   Wound Image    Drainage Amount Scant   Red (%), Wound Tissue Color 100 %   Wound Length (cm) 10 cm   Wound Width (cm) 11 cm   Wound Depth (cm) 0.1 cm   Wound Volume (cm^3) 11 cm^3   Wound Surface Area (cm^2) 110 cm^2   Patient consult for wound care to sacral area, with moisture. This site is cleanse with soap and water pat dry apply triad paste daily and prn.

## 2022-09-21 NOTE — ED NOTES
Attempted to call report three times. Nurse busy and not able to take patient report at this time. Unit secretary for 925 will have nurse call ED when available.

## 2022-09-21 NOTE — ASSESSMENT & PLAN NOTE
Home medications: atenolol 50 qd, losartan-HCTZ 100-25 mg qd    -- Continue home medications as tolerated

## 2022-09-21 NOTE — ED NOTES
Pt not tolerating CPAP too well. Pt states mask is too tight. Loosened mask as loose as possible and patient was still not happy with mask fitting. No other masks available at this time. Pt refusing to wear CPAP at this time.

## 2022-09-21 NOTE — HPI
Patient is a 77M with PMH of HTN, DM, polio, CAD (on ASA/Coumadin) who presents with multiple falls (including this morning). Patient was transferred from OSH for NSGY for possible brain mass.    Patient has a pacemaker so it is uncertain whether or not he can get an MRI. He is originally from [facility] and was transferred here for care. Patient denies head trauma with his fall this morning. He notes that he has started using a walker in the past few weeks. Patient complains of L leg pain, likely related to his fall. 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

## 2022-09-21 NOTE — ASSESSMENT & PLAN NOTE
On Anoro. Rx of singular but not taking.    -- Continue formulary equivalent of inhaler  -- PRN duoneb  -- SpO2 goal 88-92

## 2022-09-21 NOTE — ED NOTES
Informed provider of patients increasingly agitated behavior. Patient repeatedly attempting to get out of bed and remove vitals equipment. Has not been able to sleep at all this evening. Hospital bed ordered, but not down here for patient at this time.

## 2022-09-21 NOTE — SUBJECTIVE & OBJECTIVE
(Not in a hospital admission)      Review of patient's allergies indicates:  No Known Allergies    Past Medical History:   Diagnosis Date    Coronary artery disease     Diabetes mellitus     GERD (gastroesophageal reflux disease)     Hypertension      Past Surgical History:   Procedure Laterality Date    APPENDECTOMY      CARDIAC SURGERY      JOINT REPLACEMENT       Family History    None       Tobacco Use    Smoking status: Every Day     Packs/day: 1.00     Years: 30.00     Pack years: 30.00     Types: Cigarettes    Smokeless tobacco: Never   Substance and Sexual Activity    Alcohol use: Never    Drug use: Never    Sexual activity: Not on file     Review of Systems   Constitutional:  Negative for chills and fever.   HENT:  Negative for congestion, facial swelling and hearing loss.    Eyes:  Negative for pain and redness.   Respiratory:  Negative for shortness of breath and wheezing.    Cardiovascular:  Negative for chest pain.   Gastrointestinal:  Negative for abdominal pain.   Endocrine: Negative for polyuria.   Genitourinary:  Negative for difficulty urinating.   Musculoskeletal:  Positive for gait problem.   Skin:  Negative for pallor and wound.   Neurological:  Negative for seizures, syncope and facial asymmetry.   Psychiatric/Behavioral:  Negative for agitation and behavioral problems.    Objective:     Weight: 86.2 kg (190 lb)  Body mass index is 25.77 kg/m².  Vital Signs (Most Recent):  Temp: 97.8 °F (36.6 °C) (09/20/22 1456)  Pulse: 82 (09/20/22 1907)  Resp: 20 (09/20/22 1456)  BP: 133/68 (09/20/22 1906)  SpO2: 96 % (09/20/22 1907) Vital Signs (24h Range):  Temp:  [97.8 °F (36.6 °C)] 97.8 °F (36.6 °C)  Pulse:  [71-82] 82  Resp:  [20] 20  SpO2:  [96 %] 96 %  BP: (133-148)/(68-79) 133/68                          Physical Exam  General: Awake, alert, oriented  Head: Non-traumatic, normocephalic  Eyes: Pupils equal, EOMI  Neck: Supple, normal ROM, no tenderness to palpation  CVS: Normal rate and rhythm, distal  pulses present  Pulm: Symmetric expansion, no respiratory distress  GI: Abdomen nondistended, nontender  MSK: Moves all extremities without restriction, atraumatic  Skin: Dry, intact  Psych: Normal thought content and cognition    Neurosurgery Physical Exam  General: AOx3, GCS E4V5M6, possible higher order confusion  CNII-XII: Intact on fine exam, PERRL, visual fields grossly intact, EOMI, facial sensation preserved, no facial asymmetry, tongue midline, shoulder shrug equal, no pronator drift  Extremities: LUE contracted at baseline (4/5 motor), 5/5 motor otherwise, sensorium intact throughout, coordination intact throughout, DTRs 2+, no pathological reflexes, no sensory level present    Significant Labs:  Recent Labs   Lab 09/20/22  1558   *      K 3.4*      CO2 29   BUN 13   CREATININE 0.8   CALCIUM 9.5     Recent Labs   Lab 09/20/22  1558   WBC 9.06   HGB 12.9*   HCT 39.5*        Recent Labs   Lab 09/20/22  1700   INR 2.5*   APTT 37.3*     Microbiology Results (last 7 days)       ** No results found for the last 168 hours. **          All pertinent labs from the last 24 hours have been reviewed.    Significant Diagnostics:  I have reviewed all pertinent imaging results/findings within the past 24 hours.

## 2022-09-21 NOTE — ASSESSMENT & PLAN NOTE
Patient is a 77M with PMH of HTN, DM, polio, CAD (on ASA/Coumadin with pacemaker) who presents with multiple falls (including this morning). Patient was transferred from OSH for NSGY for possible brain mass.    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

## 2022-09-21 NOTE — CONSULTS
[facility]  Neurosurgery  Consult Note    Inpatient consult to Neurosurgery  Consult performed by: Bonita Fontaine MD  Consult ordered by: Kayli Stewart MD        Subjective:     Chief Complaint/Reason for Admission: brain mass    History of Present Illness: Patient is a 77M with PMH of HTN, DM, polio, CAD (on ASA/Coumadin) who presents with multiple falls (including this morning). Patient was transferred from OSH for NSGY for possible brain mass.    Patient has a pacemaker so it is uncertain whether or not he can get an MRI. He is originally from [facility] and was transferred here for care. Patient denies head trauma with his fall this morning. He notes that he has started using a walker in the past few weeks. Patient complains of L leg pain, likely related to his fall. 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      (Not in a hospital admission)      Review of patient's allergies indicates:  No Known Allergies    Past Medical History:   Diagnosis Date    Coronary artery disease     Diabetes mellitus     GERD (gastroesophageal reflux disease)     Hypertension      Past Surgical History:   Procedure Laterality Date    APPENDECTOMY      CARDIAC SURGERY      JOINT REPLACEMENT       Family History    None       Tobacco Use    Smoking status: Every Day     Packs/day: 1.00     Years: 30.00     Pack years: 30.00     Types: Cigarettes    Smokeless tobacco: Never   Substance and Sexual Activity    Alcohol use: Never    Drug use: Never    Sexual activity: Not on file     Review of Systems   Constitutional:  Negative for chills and fever.   HENT:  Negative for congestion, facial swelling and hearing loss.    Eyes:  Negative for pain and redness.   Respiratory:  Negative for shortness of breath and wheezing.    Cardiovascular:  Negative for chest pain.   Gastrointestinal:  Negative for abdominal pain.   Endocrine: Negative for polyuria.   Genitourinary:  Negative  for difficulty urinating.   Musculoskeletal:  Positive for gait problem.   Skin:  Negative for pallor and wound.   Neurological:  Negative for seizures, syncope and facial asymmetry.   Psychiatric/Behavioral:  Negative for agitation and behavioral problems.    Objective:     Weight: 86.2 kg (190 lb)  Body mass index is 25.77 kg/m².  Vital Signs (Most Recent):  Temp: 97.8 °F (36.6 °C) (09/20/22 1456)  Pulse: 82 (09/20/22 1907)  Resp: 20 (09/20/22 1456)  BP: 133/68 (09/20/22 1906)  SpO2: 96 % (09/20/22 1907) Vital Signs (24h Range):  Temp:  [97.8 °F (36.6 °C)] 97.8 °F (36.6 °C)  Pulse:  [71-82] 82  Resp:  [20] 20  SpO2:  [96 %] 96 %  BP: (133-148)/(68-79) 133/68                          Physical Exam  General: Awake, alert, oriented  Head: Non-traumatic, normocephalic  Eyes: Pupils equal, EOMI  Neck: Supple, normal ROM, no tenderness to palpation  CVS: Normal rate and rhythm, distal pulses present  Pulm: Symmetric expansion, no respiratory distress  GI: Abdomen nondistended, nontender  MSK: Moves all extremities without restriction, atraumatic  Skin: Dry, intact  Psych: Normal thought content and cognition    Neurosurgery Physical Exam  General: AOx3, GCS E4V5M6, possible higher order confusion  CNII-XII: Intact on fine exam, PERRL, visual fields grossly intact, EOMI, facial sensation preserved, no facial asymmetry, tongue midline, shoulder shrug equal, no pronator drift  Extremities: LUE contracted at baseline (4/5 motor), 5/5 motor otherwise, sensorium intact throughout, coordination intact throughout, DTRs 2+, no pathological reflexes, no sensory level present    Significant Labs:  Recent Labs   Lab 09/20/22  1558   *      K 3.4*      CO2 29   BUN 13   CREATININE 0.8   CALCIUM 9.5     Recent Labs   Lab 09/20/22  1558   WBC 9.06   HGB 12.9*   HCT 39.5*        Recent Labs   Lab 09/20/22  1700   INR 2.5*   APTT 37.3*     Microbiology Results (last 7 days)       ** No results found for the  last 168 hours. **          All pertinent labs from the last 24 hours have been reviewed.    Significant Diagnostics:  I have reviewed all pertinent imaging results/findings within the past 24 hours.    Assessment/Plan:     Brain mass  Patient is a 77M with PMH of HTN, DM, polio, CAD (on ASA/Coumadin with pacemaker) who presents with multiple falls (including this morning). Patient was transferred from OSH for NSGY for possible brain mass.    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 I will follow-up with patient. Please contact us if you have any additional questions.    Bonita Fontaine MD  Neurosurgery  [facility]

## 2022-09-21 NOTE — H&P
"[facility]  Hospital Medicine  History & Physical    Patient Name: Kieran Styles  MRN: [id number]  Patient Class: IP- Inpatient  Admission Date: 9/20/2022  Attending Physician: Jemima Damon*   Primary Care Provider: No primary care provider on file.         Patient information was obtained from spouse/SO and ER records.     Subjective:     Principal Problem:Brain mass    Chief Complaint:   Chief Complaint   Patient presents with    Fall     Transfer from [facility] [place], frequent falls. CT revealed brain mass         HPI: 76 yo PMHx DM2, CAD s/p CABG x 2 1997, history of adenomatous colon polyps (07/2017) revealing 10 mm sessile serrated adenoma, GERD, multiple pacemaker replacements most recent 2021 for AF (on warfarin), HTN, HLD, chronic back pain, MARQUIS on CPAP presenting to [facility] ED from [facility] due to a 2 week history of sudden memory deficits, disorientation, confsuion (date, situation, short-term memory), and multiple falls w/o head trauma or LOC or seizure. Wife noticed that he was forgetting where he was, what he had been doing. In addition, he has been getting weaker and more unstable on his feet. Yesterday evening, he fell while going to the bathroom and hit his elbow. Transported to EMS to [facility], where they did a CT head 09/20 this AM. Was discharged from [facility] because they initially told the wife that the CT scan was "clear", wife brought him to PCP and cardiologist OP office later that morning, but was called back by [facility] stating that they had mis-read the CT head and that the CT head was remarkable for apparent mass in brain.  Patient was subsequently transferred via EMS to [facility].  [facility] staff informed the wife that they were concerned for possible cancer, and wanted him to see neurosurgery evaluation.  Patient wife denies history cancer to her knowledge.  Patient is an active smoker " smoking 1-2 packs per day, and has smoked for the last 30 years.  Social alcohol use, no other illicit substances.    On my initial evaluation, patient is conversational; however, he is only oriented to self and time.  He exhibits short-term memory loss, and is not sure why he is here.  Overall, patient is a poor historian.  Family was not at bedside.  This history of present illness was obtained via telephone conversation with his wife ([phone] Donna Styles).  ROS includes denies chest pain, abdominal pain, nausea, vomiting, shortness of breath, cough, diarrhea, constipation, blood per rectum, fevers, chills, muscle aches, new weakness, new sensory changes.  Patient reports history of chronic lower back pain.    ED course:  Neurosurgery evaluated at [facility], concerns for metastasis.  No plans for surgical intervention at this time.  Subsequently pan CT scan pending.    PMHx/PSHx:  DM2, CAD s/p CABG x 2 , multiple pacemaker replacements most recent  for AF (on warfarin), HTN, HLD, chronic back pain, MARQUIS on CPAP    FMHx: Mother with breast cancer, . Sister NAFLD w/ cirrhosis, .      Past Medical History:   Diagnosis Date    Coronary artery disease     Diabetes mellitus     GERD (gastroesophageal reflux disease)     Hypertension        Past Surgical History:   Procedure Laterality Date    APPENDECTOMY      CARDIAC SURGERY      JOINT REPLACEMENT         Review of patient's allergies indicates:  No Known Allergies    No current facility-administered medications on file prior to encounter.     No current outpatient medications on file prior to encounter.     Family History    None       Tobacco Use    Smoking status: Every Day     Packs/day: 1.00     Years: 30.00     Pack years: 30.00     Types: Cigarettes    Smokeless tobacco: Never   Substance and Sexual Activity    Alcohol use: Never    Drug use: Never    Sexual activity: Not on file     Review of Systems    Constitutional:  Negative for chills, diaphoresis and fever.   HENT:  Negative for rhinorrhea, sneezing, sore throat and trouble swallowing.    Respiratory:  Negative for cough and shortness of breath.    Cardiovascular:  Negative for chest pain and palpitations.   Gastrointestinal:  Negative for constipation, diarrhea, nausea and vomiting.   Genitourinary:  Negative for dysuria, frequency and urgency.   Musculoskeletal:  Negative for arthralgias and myalgias.   Skin:  Negative for rash and wound.   Neurological:  Negative for syncope, facial asymmetry, speech difficulty, weakness, light-headedness and headaches.   Psychiatric/Behavioral:  Positive for confusion and decreased concentration. Negative for agitation.         Memory problems   Objective:     Vital Signs (Most Recent):  Temp: 97.8 °F (36.6 °C) (09/20/22 1456)  Pulse: 82 (09/20/22 1907)  Resp: 20 (09/20/22 1456)  BP: 133/68 (09/20/22 1906)  SpO2: 96 % (09/20/22 1907) Vital Signs (24h Range):  Temp:  [97.8 °F (36.6 °C)] 97.8 °F (36.6 °C)  Pulse:  [71-82] 82  Resp:  [20] 20  SpO2:  [96 %] 96 %  BP: (133-148)/(68-79) 133/68     Weight: 86.2 kg (190 lb)  Body mass index is 25.77 kg/m².    Physical Exam  Constitutional:       Appearance: Normal appearance. He is obese.   HENT:      Head: Normocephalic.   Eyes:      Extraocular Movements: Extraocular movements intact.      Pupils: Pupils are equal, round, and reactive to light.   Cardiovascular:      Rate and Rhythm: Normal rate and regular rhythm.      Pulses: Normal pulses.      Heart sounds: Normal heart sounds. No murmur heard.    No friction rub. No gallop.   Pulmonary:      Effort: Pulmonary effort is normal. No respiratory distress.      Breath sounds: No rales.   Chest:      Chest wall: No tenderness.   Abdominal:      General: Abdomen is flat.      Palpations: Abdomen is soft.      Tenderness: There is no abdominal tenderness. There is no guarding or rebound.   Musculoskeletal:      Cervical back:  Normal range of motion and neck supple.      Right lower leg: No edema.      Left lower leg: No edema.   Skin:     General: Skin is warm and dry.   Neurological:      General: No focal deficit present.      Mental Status: He is alert.      Comments: Oriented to person, time. Poor memory recall. Unable to provide history of recent illness.          CRANIAL NERVES     CN III, IV, VI   Pupils are equal, round, and reactive to light.     Significant Labs: All pertinent labs within the past 24 hours have been reviewed.    Significant Imaging: I have reviewed all pertinent imaging results/findings within the past 24 hours.    Assessment/Plan:     * Brain mass  77-year-old male with extensive past medical history presenting from [facility] for Neurosurgery evaluation of a brain mass found on CT head.  Per patient wife, patient has had sudden memory problems in the last 2 weeks with recurrent falls, but without loss of consciousness, seizure activity, head trauma.  Baseline is normal without other limitations.  Patient also has been describing 1 year of headache.  Has seen neurology outpatient, and has been taking gabapentin and undergoing nerve block procedures in neck with little effect. 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     Concerns for metastatic disease, possibly lung as primary given extensive smoking history.    -- NSGY evaluated, no immediate surgical intervention planned  -- Pan CT scan for staging  -- Plan for Bx depending on CT scan results  -- Neuro check q4h  -- Fall, Aspiration, Delirium precautions    COPD (chronic obstructive pulmonary disease)  On Anoro. Rx of singular but not taking.    -- Continue formulary equivalent of inhaler  -- PRN duoneb  -- SpO2 goal 88-92       Restless leg  -- Continue home ropinirole       GERD (gastroesophageal reflux disease)  -- Continue home  PPI      Chronic back pain  -- MM pain regimen PRN      Hypertension  Home medications: atenolol 50 qd, losartan-HCTZ 100-25 mg qd    -- Continue home medications as tolerated      MARQUIS on CPAP  -- Maintain nightly CPAP  -- Patient wife to bring home CPAP      Atrial fibrillation  Chronic anticoagulation use w/ warfarin  S/P pacemaker with multiple replacements    Home meds: Warfarin 7.5 qd, atenolol 50 qd  [id number]      -- Continue home medications  -- Anticoagulation with home warfarin dose; titrate to maintain INR goal 2-3  -- Daily INR  -- Cardiac telemetry  -- Maintain K > 4, Mg > 2, Ca wnl; replete PRN  -- STAT cardiology consult if hemodynamic instability for DCCV evaluation        Coronary artery disease involving coronary bypass graft  CABG x 2 1997  Dyslipidemia    -- Continue ASA 81  -- Continue HI statin    Type 2 diabetes mellitus, without long-term current use of insulin  Patient's FSGs are controlled on current medication regimen.  Last A1c reviewed- No results found for: LABA1C, HGBA1C  Most recent fingerstick glucose reviewed- Recent Labs   Lab 09/20/22  1603   POCTGLUCOSE 113*     Current correctional scale  Medium  Maintain anti-hyperglycemic dose as follows-   Antihyperglycemics (From admission, onward)    None      Home medications: metformin, pioglitazone, Victoza    -- Hold oral hyperglycemics  -- SSI, POCT BG qACHS; titrate basal/bolus regimen PRN to 140-180 goal        VTE Risk Mitigation (From admission, onward)         Ordered     warfarin (COUMADIN) tablet 7.5 mg  Daily         09/20/22 2157     Reason for No Pharmacological VTE Prophylaxis  Once        Question:  Reasons:  Answer:  Already adequately anticoagulated on oral Anticoagulants    09/20/22 2152     IP VTE HIGH RISK PATIENT  Once         09/20/22 2152     Place sequential compression device  Until discontinued         09/20/22 2039                   Zi-On MD Alyssia  Department of Hospital Medicine   [facility]  Dept

## 2022-09-21 NOTE — ASSESSMENT & PLAN NOTE
77-year-old male with extensive past medical history presenting from [facility] for Neurosurgery evaluation of a brain mass found on CT head.  Per patient wife, patient has had sudden memory problems in the last 2 weeks with recurrent falls, but without loss of consciousness, seizure activity, head trauma.  Baseline is normal without other limitations.  Patient also has been describing 1 year of headache.  Has seen neurology outpatient, and has been taking gabapentin and undergoing nerve block procedures in neck with little effect. 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     Concerns for metastatic disease, possibly lung as primary given extensive smoking history.    -- NSGY evaluated, no immediate surgical intervention planned  -- Pan CT scan for staging  -- Plan for Bx depending on CT scan results  -- Neuro check q4h  -- Fall, Aspiration, Delirium precautions

## 2022-09-22 PROBLEM — L30.8 DERMATITIS ASSOCIATED WITH MOISTURE: Status: ACTIVE | Noted: 2022-09-22

## 2022-09-22 LAB
ALBUMIN SERPL BCP-MCNC: 4 G/DL (ref 3.5–5.2)
ALP SERPL-CCNC: 53 U/L (ref 55–135)
ALT SERPL W/O P-5'-P-CCNC: 11 U/L (ref 10–44)
ANION GAP SERPL CALC-SCNC: 12 MMOL/L (ref 8–16)
AST SERPL-CCNC: 15 U/L (ref 10–40)
BASOPHILS # BLD AUTO: 0.03 K/UL (ref 0–0.2)
BASOPHILS NFR BLD: 0.3 % (ref 0–1.9)
BILIRUB SERPL-MCNC: 1.6 MG/DL (ref 0.1–1)
BUN SERPL-MCNC: 13 MG/DL (ref 8–23)
CALCIUM SERPL-MCNC: 9.5 MG/DL (ref 8.7–10.5)
CHLORIDE SERPL-SCNC: 99 MMOL/L (ref 95–110)
CO2 SERPL-SCNC: 25 MMOL/L (ref 23–29)
CREAT SERPL-MCNC: 0.8 MG/DL (ref 0.5–1.4)
DIFFERENTIAL METHOD: ABNORMAL
EOSINOPHIL # BLD AUTO: 0.2 K/UL (ref 0–0.5)
EOSINOPHIL NFR BLD: 2 % (ref 0–8)
ERYTHROCYTE [DISTWIDTH] IN BLOOD BY AUTOMATED COUNT: 16.1 % (ref 11.5–14.5)
EST. GFR  (NO RACE VARIABLE): >60 ML/MIN/1.73 M^2
GLUCOSE SERPL-MCNC: 111 MG/DL (ref 70–110)
HCT VFR BLD AUTO: 44.7 % (ref 40–54)
HGB BLD-MCNC: 14.7 G/DL (ref 14–18)
IMM GRANULOCYTES # BLD AUTO: 0.04 K/UL (ref 0–0.04)
IMM GRANULOCYTES NFR BLD AUTO: 0.4 % (ref 0–0.5)
INR PPP: 2.6 (ref 0.8–1.2)
LYMPHOCYTES # BLD AUTO: 1.1 K/UL (ref 1–4.8)
LYMPHOCYTES NFR BLD: 10.4 % (ref 18–48)
MAGNESIUM SERPL-MCNC: 1.6 MG/DL (ref 1.6–2.6)
MCH RBC QN AUTO: 29.9 PG (ref 27–31)
MCHC RBC AUTO-ENTMCNC: 32.9 G/DL (ref 32–36)
MCV RBC AUTO: 91 FL (ref 82–98)
MONOCYTES # BLD AUTO: 0.9 K/UL (ref 0.3–1)
MONOCYTES NFR BLD: 8.3 % (ref 4–15)
NEUTROPHILS # BLD AUTO: 8.2 K/UL (ref 1.8–7.7)
NEUTROPHILS NFR BLD: 78.6 % (ref 38–73)
NRBC BLD-RTO: 0 /100 WBC
PHOSPHATE SERPL-MCNC: 3.7 MG/DL (ref 2.7–4.5)
PLATELET # BLD AUTO: 172 K/UL (ref 150–450)
PMV BLD AUTO: 11.6 FL (ref 9.2–12.9)
POCT GLUCOSE: 129 MG/DL (ref 70–110)
POCT GLUCOSE: 137 MG/DL (ref 70–110)
POCT GLUCOSE: 153 MG/DL (ref 70–110)
POTASSIUM SERPL-SCNC: 3.4 MMOL/L (ref 3.5–5.1)
PROT SERPL-MCNC: 7.4 G/DL (ref 6–8.4)
PROTHROMBIN TIME: 25.6 SEC (ref 9–12.5)
RBC # BLD AUTO: 4.91 M/UL (ref 4.6–6.2)
SODIUM SERPL-SCNC: 136 MMOL/L (ref 136–145)
WBC # BLD AUTO: 10.42 K/UL (ref 3.9–12.7)

## 2022-09-22 PROCEDURE — 99233 PR SUBSEQUENT HOSPITAL CARE,LEVL III: ICD-10-PCS | Mod: GC,,, | Performed by: HOSPITALIST

## 2022-09-22 PROCEDURE — 25000242 PHARM REV CODE 250 ALT 637 W/ HCPCS

## 2022-09-22 PROCEDURE — 99900035 HC TECH TIME PER 15 MIN (STAT)

## 2022-09-22 PROCEDURE — 94660 CPAP INITIATION&MGMT: CPT

## 2022-09-22 PROCEDURE — 84100 ASSAY OF PHOSPHORUS: CPT

## 2022-09-22 PROCEDURE — 25000003 PHARM REV CODE 250

## 2022-09-22 PROCEDURE — 94640 AIRWAY INHALATION TREATMENT: CPT

## 2022-09-22 PROCEDURE — 85025 COMPLETE CBC W/AUTO DIFF WBC: CPT

## 2022-09-22 PROCEDURE — 25000003 PHARM REV CODE 250: Performed by: EMERGENCY MEDICINE

## 2022-09-22 PROCEDURE — 94761 N-INVAS EAR/PLS OXIMETRY MLT: CPT

## 2022-09-22 PROCEDURE — S4991 NICOTINE PATCH NONLEGEND: HCPCS

## 2022-09-22 PROCEDURE — 99223 PR INITIAL HOSPITAL CARE,LEVL III: ICD-10-PCS | Mod: ,,, | Performed by: NURSE PRACTITIONER

## 2022-09-22 PROCEDURE — 83735 ASSAY OF MAGNESIUM: CPT

## 2022-09-22 PROCEDURE — 85610 PROTHROMBIN TIME: CPT

## 2022-09-22 PROCEDURE — 27000221 HC OXYGEN, UP TO 24 HOURS

## 2022-09-22 PROCEDURE — 99233 SBSQ HOSP IP/OBS HIGH 50: CPT | Mod: GC,,, | Performed by: HOSPITALIST

## 2022-09-22 PROCEDURE — 80053 COMPREHEN METABOLIC PANEL: CPT

## 2022-09-22 PROCEDURE — 99223 1ST HOSP IP/OBS HIGH 75: CPT | Mod: ,,, | Performed by: NURSE PRACTITIONER

## 2022-09-22 PROCEDURE — 11000001 HC ACUTE MED/SURG PRIVATE ROOM

## 2022-09-22 PROCEDURE — 36415 COLL VENOUS BLD VENIPUNCTURE: CPT

## 2022-09-22 RX ORDER — WARFARIN SODIUM 5 MG/1
TABLET ORAL
Status: ON HOLD | COMMUNITY
End: 2022-10-01 | Stop reason: SDUPTHER

## 2022-09-22 RX ORDER — PIOGLITAZONEHYDROCHLORIDE 15 MG/1
15 TABLET ORAL DAILY
COMMUNITY

## 2022-09-22 RX ORDER — METFORMIN HYDROCHLORIDE 500 MG/1
500 TABLET ORAL 3 TIMES DAILY
COMMUNITY
Start: 2022-09-20

## 2022-09-22 RX ORDER — ROPINIROLE 1 MG/1
1 TABLET, FILM COATED ORAL NIGHTLY
COMMUNITY
Start: 2022-08-14

## 2022-09-22 RX ORDER — LIRAGLUTIDE 6 MG/ML
0.6 INJECTION SUBCUTANEOUS DAILY
COMMUNITY

## 2022-09-22 RX ORDER — ACETAMINOPHEN 325 MG/1
650 TABLET ORAL EVERY 6 HOURS PRN
COMMUNITY

## 2022-09-22 RX ORDER — HYDROCODONE BITARTRATE AND ACETAMINOPHEN 10; 325 MG/1; MG/1
1 TABLET ORAL EVERY 8 HOURS PRN
COMMUNITY
Start: 2022-08-02

## 2022-09-22 RX ORDER — ACETAMINOPHEN 325 MG/1
650 TABLET ORAL EVERY 8 HOURS PRN
Status: DISCONTINUED | OUTPATIENT
Start: 2022-09-22 | End: 2022-10-01 | Stop reason: HOSPADM

## 2022-09-22 RX ORDER — ASPIRIN 81 MG/1
1 TABLET ORAL DAILY
COMMUNITY

## 2022-09-22 RX ORDER — ATENOLOL 50 MG/1
50 TABLET ORAL DAILY
COMMUNITY
Start: 2022-09-10

## 2022-09-22 RX ORDER — ATORVASTATIN CALCIUM 10 MG/1
10 TABLET, FILM COATED ORAL NIGHTLY
Status: ON HOLD | COMMUNITY
Start: 2022-07-17 | End: 2022-10-01 | Stop reason: HOSPADM

## 2022-09-22 RX ORDER — PANTOPRAZOLE SODIUM 40 MG/1
40 TABLET, DELAYED RELEASE ORAL DAILY
COMMUNITY
Start: 2022-09-10

## 2022-09-22 RX ORDER — FUROSEMIDE 20 MG/1
20 TABLET ORAL DAILY
COMMUNITY
Start: 2022-07-12

## 2022-09-22 RX ORDER — LIDOCAINE 50 MG/G
1 PATCH TOPICAL
Status: DISCONTINUED | OUTPATIENT
Start: 2022-09-22 | End: 2022-10-01 | Stop reason: HOSPADM

## 2022-09-22 RX ORDER — LOSARTAN POTASSIUM AND HYDROCHLOROTHIAZIDE 25; 100 MG/1; MG/1
1 TABLET ORAL DAILY
Status: ON HOLD | COMMUNITY
Start: 2022-07-12 | End: 2022-10-01 | Stop reason: HOSPADM

## 2022-09-22 RX ORDER — POTASSIUM CHLORIDE 20 MEQ/1
40 TABLET, EXTENDED RELEASE ORAL
Status: COMPLETED | OUTPATIENT
Start: 2022-09-22 | End: 2022-09-22

## 2022-09-22 RX ORDER — HYDROCODONE BITARTRATE AND ACETAMINOPHEN 10; 325 MG/1; MG/1
1 TABLET ORAL EVERY 6 HOURS PRN
Status: DISCONTINUED | OUTPATIENT
Start: 2022-09-22 | End: 2022-10-01 | Stop reason: HOSPADM

## 2022-09-22 RX ORDER — DOCUSATE SODIUM 250 MG
250 CAPSULE ORAL 2 TIMES DAILY
Status: ON HOLD | COMMUNITY
End: 2022-10-01 | Stop reason: HOSPADM

## 2022-09-22 RX ORDER — OLANZAPINE 2.5 MG/1
5 TABLET ORAL 2 TIMES DAILY PRN
Status: DISCONTINUED | OUTPATIENT
Start: 2022-09-22 | End: 2022-10-01 | Stop reason: HOSPADM

## 2022-09-22 RX ORDER — GABAPENTIN 300 MG/1
CAPSULE ORAL
COMMUNITY

## 2022-09-22 RX ADMIN — LIDOCAINE 1 PATCH: 50 PATCH CUTANEOUS at 01:09

## 2022-09-22 RX ADMIN — FLUTICASONE FUROATE AND VILANTEROL TRIFENATATE 1 PUFF: 100; 25 POWDER RESPIRATORY (INHALATION) at 08:09

## 2022-09-22 RX ADMIN — Medication 1 PATCH: at 09:09

## 2022-09-22 RX ADMIN — INSULIN DETEMIR 5 UNITS: 100 INJECTION, SOLUTION SUBCUTANEOUS at 08:09

## 2022-09-22 RX ADMIN — ACETAMINOPHEN 650 MG: 325 TABLET ORAL at 08:09

## 2022-09-22 RX ADMIN — HYDROXYZINE HYDROCHLORIDE 25 MG: 25 TABLET, FILM COATED ORAL at 08:09

## 2022-09-22 RX ADMIN — ASPIRIN 81 MG: 81 TABLET, COATED ORAL at 09:09

## 2022-09-22 RX ADMIN — ROPINIROLE HYDROCHLORIDE 1 MG: 1 TABLET, FILM COATED ORAL at 08:09

## 2022-09-22 RX ADMIN — POTASSIUM CHLORIDE 40 MEQ: 1500 TABLET, EXTENDED RELEASE ORAL at 01:09

## 2022-09-22 RX ADMIN — OLANZAPINE 10 MG: 2.5 TABLET, FILM COATED ORAL at 09:09

## 2022-09-22 RX ADMIN — ATENOLOL 50 MG: 25 TABLET ORAL at 09:09

## 2022-09-22 RX ADMIN — OLANZAPINE 5 MG: 2.5 TABLET, FILM COATED ORAL at 05:09

## 2022-09-22 RX ADMIN — ACETAMINOPHEN 650 MG: 325 TABLET ORAL at 03:09

## 2022-09-22 RX ADMIN — LOSARTAN POTASSIUM AND HYDROCHLOROTHIAZIDE 1 TABLET: 100; 25 TABLET, FILM COATED ORAL at 09:09

## 2022-09-22 RX ADMIN — ACETAMINOPHEN 650 MG: 325 TABLET ORAL at 01:09

## 2022-09-22 RX ADMIN — PANTOPRAZOLE SODIUM 40 MG: 40 TABLET, DELAYED RELEASE ORAL at 09:09

## 2022-09-22 RX ADMIN — MELATONIN TAB 3 MG 6 MG: 3 TAB at 08:09

## 2022-09-22 RX ADMIN — ATORVASTATIN CALCIUM 40 MG: 40 TABLET, FILM COATED ORAL at 08:09

## 2022-09-22 RX ADMIN — HYDROXYZINE HYDROCHLORIDE 25 MG: 25 TABLET, FILM COATED ORAL at 03:09

## 2022-09-22 RX ADMIN — FUROSEMIDE 20 MG: 20 TABLET ORAL at 09:09

## 2022-09-22 NOTE — HOSPITAL COURSE
Patient presenting with sudden memory deficits, AMS, and multiple falls who was transferred from [facility] to [facility] for NSGY evaluation of the CT head (9/20) finding of a brain mass concerning for cancer. No plans for surgical intervention at this time. CT demonstrated a pulmonary nodule, bony lesions in left ilium, L3 and L4 vertebral bodies concerning for metastasis. Patient's pacemaker leads are not compatible with MRI which is limiting imaging workup of the mass. NSGY recommended course of dexamethasone wityh follow up CT head on 9/26 to assess for interval change. ID consulted to rule out possible infectious etiology, brain mass with pulmonary nodule likely 2/2 malignancy given h/o significant smoking and recommended outpatient IR bx of pulmonary nodule in addition to further infectious w/u. LP with IR completed on 9/27, CSF studies remarkable for lymphocytic pleocytosis, elevated protein, glucose, and negative flow cytometry. Pending cytology results from LP then NSGY will proceed with determining if a surgical plan is necessary. CT head per NSGY planned for early next week. NSGY recommended holding anticoagulation due to high risk of intracranial bleed 2/2 small foci of hemorrhage currently located within the brain mass. The risks and benefits of holding anticoagulation in this patient with paroxysmal atrial fibrillation were discussed with the patient/family who voiced understanding and agreed with the plan. Patient medically stable for discharge to inpatient rehab, remainder of workup to be done outpatient with NSGY and Heme/Onc follow up.

## 2022-09-22 NOTE — ASSESSMENT & PLAN NOTE
Patient is a 77M with PMH of HTN, DM, polio, CAD (on ASA/Coumadin with pacemaker) who presents with multiple falls (including this morning). Patient was transferred from OSH for NSGY for possible brain mass.    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

## 2022-09-22 NOTE — SUBJECTIVE & OBJECTIVE
Interval History: Patient agitated overnight requiring a one time dose of olanzapine 5mg. He reports chronic low back pain that is bothering him this morning, restarted on home narco.     Review of Systems   Constitutional:  Negative for chills and fever.   HENT:  Negative for rhinorrhea, sneezing, sore throat and trouble swallowing.    Respiratory:  Negative for cough and shortness of breath.    Cardiovascular:  Negative for chest pain and palpitations.   Gastrointestinal:  Negative for constipation, diarrhea, nausea and vomiting.   Genitourinary:  Negative for dysuria, frequency and urgency.   Musculoskeletal:  Negative for arthralgias and myalgias.   Skin:  Negative for rash and wound.   Neurological:  Negative for syncope, facial asymmetry, speech difficulty, weakness, light-headedness and headaches.   Psychiatric/Behavioral:  Positive for agitation, confusion and decreased concentration.    Objective:     Vital Signs (Most Recent):  Temp: 97.8 °F (36.6 °C) (09/22/22 1509)  Pulse: 73 (09/22/22 1600)  Resp: 18 (09/22/22 1509)  BP: 126/60 (09/22/22 1509)  SpO2: 97 % (09/22/22 1509) Vital Signs (24h Range):  Temp:  [96.6 °F (35.9 °C)-98.5 °F (36.9 °C)] 97.8 °F (36.6 °C)  Pulse:  [68-77] 73  Resp:  [17-20] 18  SpO2:  [93 %-98 %] 97 %  BP: (117-151)/(60-88) 126/60     Weight: 86.2 kg (190 lb)  Body mass index is 25.77 kg/m².    Intake/Output Summary (Last 24 hours) at 9/22/2022 1638  Last data filed at 9/21/2022 1943  Gross per 24 hour   Intake 240 ml   Output --   Net 240 ml      Physical Exam  Vitals and nursing note reviewed.   Constitutional:       General: He is not in acute distress.     Appearance: He is obese. He is not ill-appearing or diaphoretic.   HENT:      Right Ear: External ear normal.      Left Ear: External ear normal.      Nose: Nose normal.   Eyes:      General:         Right eye: No discharge.         Left eye: No discharge.      Conjunctiva/sclera: Conjunctivae normal.   Cardiovascular:       Rate and Rhythm: Normal rate and regular rhythm.      Heart sounds: Normal heart sounds. No murmur heard.  Pulmonary:      Effort: Pulmonary effort is normal. No respiratory distress.      Breath sounds: Normal breath sounds. No wheezing or rales.   Abdominal:      General: Abdomen is flat. There is no distension.      Palpations: There is no mass.      Tenderness: There is no abdominal tenderness.   Musculoskeletal:         General: No swelling or deformity. Normal range of motion.      Cervical back: Normal range of motion and neck supple.      Right lower leg: No edema.      Left lower leg: No edema.   Skin:     General: Skin is warm.      Coloration: Skin is not jaundiced.      Findings: No bruising.   Neurological:      Mental Status: He is alert.      Cranial Nerves: No cranial nerve deficit.      Motor: Weakness (left sided) present.      Comments: Oriented to person and year but not to place  Poor memory recall       Significant Labs: All pertinent labs within the past 24 hours have been reviewed.    Significant Imaging: I have reviewed all pertinent imaging results/findings within the past 24 hours.

## 2022-09-22 NOTE — ASSESSMENT & PLAN NOTE
Chronic anticoagulation use w/ warfarin  S/P pacemaker with multiple replacements    Home meds: Warfarin 7.5 qd, atenolol 50 qd  [id number]      -- Discontinued home warfarin in the case of NSGY intervention, plan to introduce heparin after potential intervention  -- Continue home ASA and statin  -- Daily INR  -- Cardiac telemetry  -- Maintain K > 4, Mg > 2, Ca wnl; replete PRN  -- STAT cardiology consult if hemodynamic instability for DCCV evaluation

## 2022-09-22 NOTE — ASSESSMENT & PLAN NOTE
- consult received for evaluation of skin breakdown.  - pt presented to [facility] from [facility] due to a 2 week history of sudden memory deficits, disorientation, confsuion (date, situation, short-term memory), and multiple falls.  - redness and irritation noted to sacrum and gluteal cleft likely due to moisture dermatitis.  - large purple bruise with yellow periwound noted to right hip/buttocks likely from multiple falls.  - continue Triad bid/prn soiling to sacrum.  - team at bedside cleaning pt and placing waffle overlay to bed.  - Taran surface.  - wedge/pillows for offloading.  - turn q2h.  - nursing to maintain pressure injury prevention measures and continue wound care per orders.

## 2022-09-22 NOTE — PLAN OF CARE
Problem: Fall Injury Risk  Goal: Absence of Fall and Fall-Related Injury  Outcome: Ongoing, Progressing  Intervention: Promote Injury-Free Environment  Flowsheets (Taken 9/22/2022 0249)  Safety Promotion/Fall Prevention:   bed alarm set   Fall Risk reviewed with patient/family   Fall Risk signage in place   family to remain at bedside   nonskid shoes/socks when out of bed   side rails raised x 3   supervised activity     Problem: Restraint, Nonbehavioral (Nonviolent)  Goal: Absence of Harm or Injury  Outcome: Ongoing, Progressing  Intervention: Protect Skin and Joint Integrity  Flowsheets (Taken 9/22/2022 0249)  Body Position:   turned   weight shifting     Problem: Diabetes Comorbidity  Goal: Blood Glucose Level Within Targeted Range  Outcome: Ongoing, Progressing  Intervention: Monitor and Manage Glycemia  Flowsheets (Taken 9/22/2022 0249)  Glycemic Management: blood glucose monitored   POC reviewed with pt and spouse at the bedside.  Pt needs reinforcement, wife verbalized understanding.  Confusion, agitation and restlessness noted.  C/o restraints, attempted to get OOB of bed and pulling of medical devices noted.  Medication to manage agitation, restlessness and anxiety administered PO.  Meds somewhat effective.  VS and blood glucose monitored.  See VS in the flowsheet.  No injuries noted while in restraints.  Environment adjusted.  No falls.  Safety and aspiration precautions in place.  WCTM.

## 2022-09-22 NOTE — HPI
"Kieran Styles is a 77 year old male with PMHx of DM2, CAD s/p CABG x 2 1997, history of adenomatous colon polyps (07/2017) revealing 10 mm sessile serrated adenoma, GERD, multiple pacemaker replacements most recent 2021 for AF (on warfarin), HTN, HLD, chronic back pain, MARQUIS on CPAP [place] [facility] [place] [facility] due to a 2 week history of sudden memory deficits, disorientation, confsuion (date, situation, short-term memory), and multiple falls w/o head trauma or LOC or seizure. Wife noticed that he was forgetting where he was, what he had been doing. In addition, he has been getting weaker and more unstable on his feet. Yesterday evening, he fell while going to the bathroom and hit his elbow. Transported to EMS to [facility], where they did a CT head 09/20 this AM. Was discharged from [facility] because they initially told the wife that the CT scan was "clear", wife brought him to PCP and cardiologist OP office later that morning, but was called back by [facility] stating that they had mis-read the CT head and that the CT head was remarkable for apparent mass in brain.  Patient was subsequently transferred via EMS to [facility].  [facility] staff informed the wife that they were concerned for possible cancer, and wanted him to see neurosurgery evaluation.  Patient wife denies history cancer to her knowledge.  Patient is an active smoker smoking 1-2 packs per day, and has smoked for the last 30 years.  Social alcohol use, no other illicit substances. Pt admitted to hospital medicine service with neurosurgery evaluation. Skin integrity CODI consulted for evaluation of skin injury.  "

## 2022-09-22 NOTE — MEDICAL/APP STUDENT
[facility] - Neurosurgery ([facility]  Student Progress Note    Patient Name: Kieran Styles  MRN: [id number]   Admission Date: 9/20/2022  Length of Stay: 2 days  Attending Physician: Edy Fried MD        Subjective:     Principal Problem:Brain mass  Please see H&P for detailed presenting history and ROS.    Interval History:     Patient's wife in the room able to elaborate on some his history. Patient AAOx3 and able to answers questions. No acute complaints. Planning for MRI today. Full neuro exam performed    Review of Systems   Constitutional:  Negative for chills and fever.   HENT: Negative.     Respiratory:  Negative for chest tightness, shortness of breath and wheezing.    Cardiovascular:  Negative for chest pain, palpitations and leg swelling.   Gastrointestinal:  Negative for abdominal distention, abdominal pain, blood in stool, constipation, diarrhea, nausea and vomiting.   Genitourinary:  Negative for dysuria and hematuria.   Musculoskeletal:  Positive for arthralgias. Negative for myalgias.   Skin:  Positive for wound. Negative for rash.        Sacral wound   Neurological:  Positive for weakness and headaches. Negative for dizziness and numbness.        Left sided UE weakness   Psychiatric/Behavioral:  Positive for agitation. Negative for behavioral problems and confusion.      Objective:     Vital Signs (Most Recent):  Temp: 97.8 °F (36.6 °C) (09/22/22 1509)  Pulse: 73 (09/22/22 1600)  Resp: 18 (09/22/22 1509)  BP: 126/60 (09/22/22 1509)  SpO2: 97 % (09/22/22 1509) Vital Signs (24h Range):  Temp:  [96.6 °F (35.9 °C)-98.5 °F (36.9 °C)] 97.8 °F (36.6 °C)  Pulse:  [68-77] 73  Resp:  [17-20] 18  SpO2:  [93 %-98 %] 97 %  BP: (117-151)/(60-88) 126/60     Weight: 86.2 kg (190 lb)  Body mass index is 25.77 kg/m².    Intake/Output Summary (Last 24 hours) at 9/22/2022 1602  Last data filed at 9/21/2022 1943  Gross per 24 hour   Intake 240 ml   Output --   Net 240 ml      Physical  Exam  Constitutional:       General: He is not in acute distress.     Appearance: Normal appearance. He is normal weight. He is not ill-appearing or toxic-appearing.   Eyes:      Extraocular Movements: Extraocular movements intact.      Conjunctiva/sclera: Conjunctivae normal.      Pupils: Pupils are equal, round, and reactive to light.   Cardiovascular:      Rate and Rhythm: Normal rate and regular rhythm.      Heart sounds: No murmur heard.    No friction rub. No gallop.   Pulmonary:      Effort: Pulmonary effort is normal. No respiratory distress.      Breath sounds: Normal breath sounds. No wheezing or rales.   Abdominal:      General: Abdomen is flat. Bowel sounds are normal. There is no distension.      Palpations: Abdomen is soft.      Tenderness: There is no abdominal tenderness. There is no guarding or rebound.   Musculoskeletal:         General: Deformity present.      Comments: Right sided trigger finger   Skin:     Coloration: Skin is not jaundiced.      Findings: Lesion present.      Comments: Sacral wound   Neurological:      Mental Status: He is alert and oriented to person, place, and time.      Cranial Nerves: No cranial nerve deficit.      Sensory: No sensory deficit.      Motor: Weakness present.      Coordination: Coordination normal.      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      Significant Labs:   Recent Results (from the past 24 hour(s))   POCT glucose    Collection Time: 09/21/22  4:25 PM   Result Value Ref Range    POCT Glucose 122 (H) 70 - 110 mg/dL   POCT glucose    Collection Time: 09/21/22  8:43 PM   Result Value Ref Range    POCT Glucose 113 (H) 70 - 110 mg/dL   CBC with Automated Differential    Collection Time: 09/22/22  5:56 AM   Result Value Ref Range    WBC 10.42 3.90 - 12.70 K/uL    RBC 4.91 4.60 - 6.20 M/uL    Hemoglobin 14.7 14.0 - 18.0 g/dL    Hematocrit 44.7 40.0 - 54.0 %    MCV 91 82 - 98 fL    MCH 29.9 27.0 - 31.0 pg    MCHC 32.9 32.0 - 36.0 g/dL     RDW 16.1 (H) 11.5 - 14.5 %    Platelets 172 150 - 450 K/uL    MPV 11.6 9.2 - 12.9 fL    Immature Granulocytes 0.4 0.0 - 0.5 %    Gran # (ANC) 8.2 (H) 1.8 - 7.7 K/uL    Immature Grans (Abs) 0.04 0.00 - 0.04 K/uL    Lymph # 1.1 1.0 - 4.8 K/uL    Mono # 0.9 0.3 - 1.0 K/uL    Eos # 0.2 0.0 - 0.5 K/uL    Baso # 0.03 0.00 - 0.20 K/uL    nRBC 0 0 /100 WBC    Gran % 78.6 (H) 38.0 - 73.0 %    Lymph % 10.4 (L) 18.0 - 48.0 %    Mono % 8.3 4.0 - 15.0 %    Eosinophil % 2.0 0.0 - 8.0 %    Basophil % 0.3 0.0 - 1.9 %    Differential Method Automated    Comprehensive Metabolic Panel (CMP)    Collection Time: 09/22/22  5:57 AM   Result Value Ref Range    Sodium 136 136 - 145 mmol/L    Potassium 3.4 (L) 3.5 - 5.1 mmol/L    Chloride 99 95 - 110 mmol/L    CO2 25 23 - 29 mmol/L    Glucose 111 (H) 70 - 110 mg/dL    BUN 13 8 - 23 mg/dL    Creatinine 0.8 0.5 - 1.4 mg/dL    Calcium 9.5 8.7 - 10.5 mg/dL    Total Protein 7.4 6.0 - 8.4 g/dL    Albumin 4.0 3.5 - 5.2 g/dL    Total Bilirubin 1.6 (H) 0.1 - 1.0 mg/dL    Alkaline Phosphatase 53 (L) 55 - 135 U/L    AST 15 10 - 40 U/L    ALT 11 10 - 44 U/L    Anion Gap 12 8 - 16 mmol/L    eGFR >60.0 >60 mL/min/1.73 m^2   Magnesium    Collection Time: 09/22/22  5:57 AM   Result Value Ref Range    Magnesium 1.6 1.6 - 2.6 mg/dL   Phosphorus    Collection Time: 09/22/22  5:57 AM   Result Value Ref Range    Phosphorus 3.7 2.7 - 4.5 mg/dL   PT/INR    Collection Time: 09/22/22  5:57 AM   Result Value Ref Range    Prothrombin Time 25.6 (H) 9.0 - 12.5 sec    INR 2.6 (H) 0.8 - 1.2   POCT glucose    Collection Time: 09/22/22  7:42 AM   Result Value Ref Range    POCT Glucose 129 (H) 70 - 110 mg/dL   POCT glucose    Collection Time: 09/22/22 11:27 AM   Result Value Ref Range    POCT Glucose 137 (H) 70 - 110 mg/dL       Significant Imaging:  Pan CT Scan 9/20/2022:     Impression:     1. 1.1 cm solid pulmonary nodule in the right upper lobe.  Additional micronodule right middle lobe.  For multiple solid nodules with  any 6 mm or greater, Fleischner Society guidelines recommend follow up with non-contrast chest CT at 3-6 months and 18-24 months after discovery.  If further evaluation desired at this time PET scan or tissue sampling may be helpful..  2. Nonspecific lucent lesions in the left ilium and L3 and L4 vertebral body.  Unfortunately metastases not excluded..  3. 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  4. Hepatosplenomegaly.    Assessment/Plan:     Brain mass  77-year-old male with extensive past medical history presenting from [facility] for Neurosurgery evaluation of a brain mass found on CT head.  Per patient wife, patient has had sudden memory problems in the last 2 weeks with recurrent falls, but without loss of consciousness, seizure activity, head trauma.  Baseline is normal without other limitations.  Patient also has been describing 1 year of headache.  Has seen neurology outpatient, and has been taking gabapentin and undergoing nerve block procedures in neck with little effect. 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      Concerns for metastatic disease, possibly lung as primary given extensive smoking history.  -Pan CT scan:  1. 1.1 cm solid pulmonary nodule in the right upper lobe.  Additional micronodule right middle lobe.  For multiple solid nodules with any 6 mm or greater, Fleischner Society guidelines recommend follow up with non-contrast chest CT at 3-6 months and 18-24 months after discovery.  If further evaluation desired at this time PET scan or tissue sampling may be helpful..  2. Nonspecific lucent lesions in the left ilium and L3 and L4 vertebral body.  Unfortunately metastases not excluded..  3. 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  4. Hepatosplenomegaly.       -- NSGY plan for biopsy next week  -- F/u MRI w/wo constrast, per  NSGY  - Will attempt to obtain prior CT records  -- Neuro check q4h  -- Fall, Aspiration, Delirium precautions     COPD (chronic obstructive pulmonary disease)  On Anoro. Rx of singular but not taking.     -- Continue formulary equivalent of inhaler  -- PRN duoneb  -- SpO2 goal 88-92      Dermatitis associated with moisture  -- Wound care following     Restless leg  -- Continue home ropinirole      GERD (gastroesophageal reflux disease)  -- Continue home Pantoprazole 40 mg qd     Chronic back pain  -- MM pain regimen PRN     Hypertension     -- Continue home atenolol 50 qd and losartan-HCTZ 100-25 mg qd      MARQUIS on CPAP    -- Continue home CPAP     Atrial fibrillation  Chronic anticoagulation use w/ warfarin  S/P pacemaker with multiple replacements  -Home meds: Warfarin 7.5 qd, atenolol 50 qd  [id number] 8       -- dc warfarin for upcoming surgery; titrate to maintain INR goal 2-3; will bridge back to warfarin following surgery  -- Daily INR  -- Cardiac telemetry  -- Maintain K > 4, Mg > 2, Ca wnl; replete PRN     Coronary artery disease involving coronary bypass graft  CABG x 2 1997  Dyslipidemia     -- Continue ASA 81  -- Continue HI statin     Type 2 diabetes mellitus, without long-term current use of insulin  -Home medications: metformin, pioglitazone, Victoza     -- Detemir 5 units + SSI        VTE Risk Mitigation (From admission, onward)           Ordered     Reason for No Pharmacological VTE Prophylaxis  Once        Question:  Reasons:  Answer:  Already adequately anticoagulated on oral Anticoagulants    09/20/22 2152     IP VTE HIGH RISK PATIENT  Once         09/20/22 2152     Place sequential compression device  Until discontinued         09/20/22 2039                     To be discussed with team.    Luis Angel Dueñas, MS4

## 2022-09-22 NOTE — SUBJECTIVE & OBJECTIVE
Interval History: 9/22: Patient had difficulty sleeping last night and is in restraints for attempts to remove lines. Higher order confusion, but alert to self and answers some questions. Wife at bedside.    Medications:  Continuous Infusions:  Scheduled Meds:   aspirin  81 mg Oral Daily    atenoloL  50 mg Oral Daily    atorvastatin  40 mg Oral QHS    fluticasone furoate-vilanteroL  1 puff Inhalation Daily    furosemide  20 mg Oral Daily    insulin detemir U-100  5 Units Subcutaneous QHS    LIDOcaine  1 patch Transdermal Q24H    losartan-hydrochlorothiazide 100-25 mg  1 tablet Oral Daily    nicotine  1 patch Transdermal Daily    OLANZapine  10 mg Oral Daily    pantoprazole  40 mg Oral Daily    rOPINIRole  1 mg Oral QHS     PRN Meds:acetaminophen, albuterol-ipratropium, aluminum-magnesium hydroxide-simethicone, dextrose 10%, dextrose 10%, glucagon (human recombinant), glucose, glucose, HYDROcodone-acetaminophen, hydrOXYzine HCL, insulin aspart U-100, melatonin, naloxone, OLANZapine, polyethylene glycol, senna-docusate 8.6-50 mg, sodium chloride 0.9%, sodium chloride 0.9%     Review of Systems  Objective:     Weight: 86.2 kg (190 lb)  Body mass index is 25.77 kg/m².  Vital Signs (Most Recent):  Temp: 97.8 °F (36.6 °C) (09/22/22 1509)  Pulse: 73 (09/22/22 1600)  Resp: 18 (09/22/22 1509)  BP: 126/60 (09/22/22 1509)  SpO2: 97 % (09/22/22 1509) Vital Signs (24h Range):  Temp:  [96.6 °F (35.9 °C)-98.5 °F (36.9 °C)] 97.8 °F (36.6 °C)  Pulse:  [68-77] 73  Resp:  [17-20] 18  SpO2:  [93 %-98 %] 97 %  BP: (117-151)/(60-88) 126/60                          Physical Exam    Neurosurgery Physical Exam  General: AOx2, GCS [id number], higher order confusion  CNII-XII: Intact on fine exam, PERRL, visual fields grossly intact, EOMI, facial sensation preserved, no facial asymmetry, tongue midline, shoulder shrug equal, no pronator drift  Extremities: LUE contracted at baseline (4/5 motor), 5/5 motor otherwise, sensorium intact throughout,  coordination intact throughout, DTRs 2+, no pathological reflexes, no sensory level present    Significant Labs:  Recent Labs   Lab 09/21/22  0344 09/22/22  0557    111*    136   K 3.8 3.4*    99   CO2 24 25   BUN 10 13   CREATININE 0.7 0.8   CALCIUM 9.3 9.5   MG 1.6 1.6     Recent Labs   Lab 09/21/22  0344 09/22/22  0556   WBC 9.15 10.42   HGB 12.7* 14.7   HCT 39.1* 44.7    172     Recent Labs   Lab 09/21/22  0344 09/22/22  0557   INR 2.5* 2.6*     Microbiology Results (last 7 days)       ** No results found for the last 168 hours. **          All pertinent labs from the last 24 hours have been reviewed.    Significant Diagnostics:  I have reviewed all pertinent imaging results/findings within the past 24 hours.

## 2022-09-22 NOTE — PROGRESS NOTES
"Charlie Bryan - Neurosurgery ([facility])  [facility] Medicine  Progress Note    Patient Name: Kieran Styles  MRN: [id number]  Patient Class: IP- Inpatient   Admission Date: 9/20/2022  Length of Stay: 2 days  Attending Physician: Edy Fried MD  Primary Care Provider: Sony Holt DO        Subjective:     Principal Problem:Brain mass        HPI:  78 yo PMHx DM2, CAD s/p CABG x 2 1997, history of adenomatous colon polyps (07/2017) revealing 10 mm sessile serrated adenoma, GERD, multiple pacemaker replacements most recent 2021 for AF (on warfarin), HTN, HLD, chronic back pain, MARQUIS on CPAP presenting to [facility] ED from [facility] due to a 2 week history of sudden memory deficits, disorientation, confsuion (date, situation, short-term memory), and multiple falls w/o head trauma or LOC or seizure. Wife noticed that he was forgetting where he was, what he had been doing. In addition, he has been getting weaker and more unstable on his feet. Yesterday evening, he fell while going to the bathroom and hit his elbow. Transported to EMS to [facility], where they did a CT head 09/20 this AM. Was discharged from [facility] because they initially told the wife that the CT scan was "clear", wife brought him to PCP and cardiologist OP office later that morning, but was called back by [facility] stating that they had mis-read the CT head and that the CT head was remarkable for apparent mass in brain.  Patient was subsequently transferred via EMS to [facility].  [facility] staff informed the wife that they were concerned for possible cancer, and wanted him to see neurosurgery evaluation.  Patient wife denies history cancer to her knowledge.  Patient is an active smoker smoking 1-2 packs per day, and has smoked for the last 30 years.  Social alcohol use, no other illicit substances.    On my initial evaluation, patient is conversational; however, he is only oriented to " self and time.  He exhibits short-term memory loss, and is not sure why he is here.  Overall, patient is a poor historian.  Family was not at bedside.  This history of present illness was obtained via telephone conversation with his wife ([phone] Donna Styles).  ROS includes denies chest pain, abdominal pain, nausea, vomiting, shortness of breath, cough, diarrhea, constipation, blood per rectum, fevers, chills, muscle aches, new weakness, new sensory changes.  Patient reports history of chronic lower back pain.    ED course:  Neurosurgery evaluated at [facility], concerns for metastasis.  No plans for surgical intervention at this time.  Subsequently pan CT scan pending.    PMHx/PSHx:  DM2, CAD s/p CABG x 2 , multiple pacemaker replacements most recent  for AF (on warfarin), HTN, HLD, chronic back pain, MARQUIS on CPAP    FMHx: Mother with breast cancer, . Sister NAFLD w/ cirrhosis, .      Overview/Hospital Course:  Patient presenting with sudden memory deficits, AMS, and multiple falls who was transferred from [facility] for NSGY evaluation of the CT head () finding of a brain mass concerning for cancer. Neurosurgery evaluated at [facility], concerns for metastasis. No plans for surgical intervention at this time. CT A/P with con demonstrated a pulmonary nodule, bony lesions in left ilium, L3 and L4 vertebral bodies concerning for metastasis. Patient's pacemaker compatible with MRI, pending.       Interval History: Patient agitated overnight requiring a one time dose of olanzapine 5mg. He reports chronic low back pain that is bothering him this morning, restarted on home narco.     Review of Systems   Constitutional:  Negative for chills and fever.   HENT:  Negative for rhinorrhea, sneezing, sore throat and trouble swallowing.    Respiratory:  Negative for cough and shortness of breath.    Cardiovascular:  Negative for chest pain and palpitations.   Gastrointestinal:   Negative for constipation, diarrhea, nausea and vomiting.   Genitourinary:  Negative for dysuria, frequency and urgency.   Musculoskeletal:  Negative for arthralgias and myalgias.   Skin:  Negative for rash and wound.   Neurological:  Negative for syncope, facial asymmetry, speech difficulty, weakness, light-headedness and headaches.   Psychiatric/Behavioral:  Positive for agitation, confusion and decreased concentration.    Objective:     Vital Signs (Most Recent):  Temp: 97.8 °F (36.6 °C) (09/22/22 1509)  Pulse: 73 (09/22/22 1600)  Resp: 18 (09/22/22 1509)  BP: 126/60 (09/22/22 1509)  SpO2: 97 % (09/22/22 1509) Vital Signs (24h Range):  Temp:  [96.6 °F (35.9 °C)-98.5 °F (36.9 °C)] 97.8 °F (36.6 °C)  Pulse:  [68-77] 73  Resp:  [17-20] 18  SpO2:  [93 %-98 %] 97 %  BP: (117-151)/(60-88) 126/60     Weight: 86.2 kg (190 lb)  Body mass index is 25.77 kg/m².    Intake/Output Summary (Last 24 hours) at 9/22/2022 1638  Last data filed at 9/21/2022 1943  Gross per 24 hour   Intake 240 ml   Output --   Net 240 ml      Physical Exam  Vitals and nursing note reviewed.   Constitutional:       General: He is not in acute distress.     Appearance: He is obese. He is not ill-appearing or diaphoretic.   HENT:      Right Ear: External ear normal.      Left Ear: External ear normal.      Nose: Nose normal.   Eyes:      General:         Right eye: No discharge.         Left eye: No discharge.      Conjunctiva/sclera: Conjunctivae normal.   Cardiovascular:      Rate and Rhythm: Normal rate and regular rhythm.      Heart sounds: Normal heart sounds. No murmur heard.  Pulmonary:      Effort: Pulmonary effort is normal. No respiratory distress.      Breath sounds: Normal breath sounds. No wheezing or rales.   Abdominal:      General: Abdomen is flat. There is no distension.      Palpations: There is no mass.      Tenderness: There is no abdominal tenderness.   Musculoskeletal:         General: No swelling or deformity. Normal range of  motion.      Cervical back: Normal range of motion and neck supple.      Right lower leg: No edema.      Left lower leg: No edema.   Skin:     General: Skin is warm.      Coloration: Skin is not jaundiced.      Findings: No bruising.   Neurological:      Mental Status: He is alert.      Cranial Nerves: No cranial nerve deficit.      Motor: Weakness (left sided) present.      Comments: Oriented to person and year but not to place  Poor memory recall       Significant Labs: All pertinent labs within the past 24 hours have been reviewed.    Significant Imaging: I have reviewed all pertinent imaging results/findings within the past 24 hours.      Assessment/Plan:      * Brain mass  77-year-old male with extensive past medical history presenting from [facility] for Neurosurgery evaluation of a brain mass found on CT head.  Per patient wife, patient has had sudden memory problems in the last 2 weeks with recurrent falls, but without loss of consciousness, seizure activity, head trauma.  Baseline is normal without other limitations.  Patient also has been describing 1 year of headache.  Has seen neurology outpatient, and has been taking gabapentin and undergoing nerve block procedures in neck with little effect. 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     Concerns for metastatic disease, possibly lung as primary given extensive smoking history.    -- NSGY evaluated, appreciate recs   - no immediate surgical intervention planned   - pacemaker compatible with MRI, f/u MRI  -- CT scan for staging, lung nodule and bony lesion of the ilium and lumbar vertebral bodies  -- Plan for Bx depending on CT scan results  -- Neuro check q4h  -- Fall, Aspiration, Delirium precautions    COPD (chronic obstructive pulmonary disease)  On Anoro. Rx of singular but not taking.    -- Continue formulary equivalent of  inhaler  -- PRN duoneb  -- SpO2 goal 88-92       Restless leg  -- Continue home ropinirole       GERD (gastroesophageal reflux disease)  -- Continue home PPI      Chronic back pain  -- MM pain regimen PRN   Tylenol PRN   Home Narco PRN   Lidocaine patches      Hypertension  Home medications: atenolol 50 qd, losartan-HCTZ 100-25 mg qd    -- Continue home medications as tolerated      MARQUIS on CPAP  -- Maintain nightly CPAP  -- Patient wife to bring home CPAP      Atrial fibrillation  Chronic anticoagulation use w/ warfarin  S/P pacemaker with multiple replacements    Home meds: Warfarin 7.5 qd, atenolol 50 qd  [id number]      -- Discontinued home warfarin in the case of NSGY intervention, plan to introduce heparin after potential intervention  -- Continue home ASA and statin  -- Daily INR  -- Cardiac telemetry  -- Maintain K > 4, Mg > 2, Ca wnl; replete PRN  -- STAT cardiology consult if hemodynamic instability for DCCV evaluation        Hyperlipidemia  - See CAD involving coronary bypass graft      Coronary artery disease involving coronary bypass graft  CABG x 2 1997  Dyslipidemia    -- Continue ASA 81  -- Continue HI statin    Type 2 diabetes mellitus, without long-term current use of insulin  Patient's FSGs are controlled on current medication regimen.  Last A1c reviewed- No results found for: LABA1C, HGBA1C  Most recent fingerstick glucose reviewed- Recent Labs   Lab 09/20/22  1603   POCTGLUCOSE 113*     Current correctional scale  Medium  Maintain anti-hyperglycemic dose as follows-   Antihyperglycemics (From admission, onward)    None      Home medications: metformin, pioglitazone, Victoza    -- Hold oral hyperglycemics  -- SSI, POCT BG qACHS; titrate basal/bolus regimen PRN to 140-180 goal        VTE Risk Mitigation (From admission, onward)         Ordered     Reason for No Pharmacological VTE Prophylaxis  Once        Question:  Reasons:  Answer:  Already adequately anticoagulated on oral Anticoagulants     09/20/22 2152     IP VTE HIGH RISK PATIENT  Once         09/20/22 2152     Place sequential compression device  Until discontinued         09/20/22 2039                Discharge Planning   ANITA: 9/27/2022     Code Status: Full Code   Is the patient medically ready for discharge?: No    Reason for patient still in hospital (select all that apply): Patient trending condition, Treatment, Imaging and Consult recommendations  Discharge Plan A: Other (TBD)                  Sarah Llanos MD  Department of Hospital Medicine   [facility] - Neurosurgery ([facility])

## 2022-09-22 NOTE — NURSING
2300 paged team.  Pt was restless @ 2030 and melatonin 6mg, tylenol 650 mg PO and hydroxyzine 25 mg PO, became calm and slept.  @ 2245 he started yelling, restless and attempting to remove restraints.    Team made aware.  No injuries noted to mariano wrists while on restraints.  WCTM.

## 2022-09-22 NOTE — PROGRESS NOTES
Charlie Bryan - Neurosurgery ([facility])  Neurosurgery  Progress Note    Subjective:     History of Present Illness: Patient is a 77M with PMH of HTN, DM, polio, CAD (on ASA/Coumadin) who presents with multiple falls (including this morning). Patient was transferred from OSH for NSGY for possible brain mass.    Patient has a pacemaker so it is uncertain whether or not he can get an MRI. He is originally from [facility] and was transferred here for care. Patient denies head trauma with his fall this morning. He notes that he has started using a walker in the past few weeks. Patient complains of L leg pain, likely related to his fall. 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      Post-Op Info:  * No surgery found *         Interval History: 9/22: Patient had difficulty sleeping last night and is in restraints for attempts to remove lines. Higher order confusion, but alert to self and answers some questions. Wife at bedside.    Medications:  Continuous Infusions:  Scheduled Meds:   aspirin  81 mg Oral Daily    atenoloL  50 mg Oral Daily    atorvastatin  40 mg Oral QHS    fluticasone furoate-vilanteroL  1 puff Inhalation Daily    furosemide  20 mg Oral Daily    insulin detemir U-100  5 Units Subcutaneous QHS    LIDOcaine  1 patch Transdermal Q24H    losartan-hydrochlorothiazide 100-25 mg  1 tablet Oral Daily    nicotine  1 patch Transdermal Daily    OLANZapine  10 mg Oral Daily    pantoprazole  40 mg Oral Daily    rOPINIRole  1 mg Oral QHS     PRN Meds:acetaminophen, albuterol-ipratropium, aluminum-magnesium hydroxide-simethicone, dextrose 10%, dextrose 10%, glucagon (human recombinant), glucose, glucose, HYDROcodone-acetaminophen, hydrOXYzine HCL, insulin aspart U-100, melatonin, naloxone, OLANZapine, polyethylene glycol, senna-docusate 8.6-50 mg, sodium chloride 0.9%, sodium chloride 0.9%     Review of Systems  Objective:     Weight: 86.2 kg (190 lb)  Body mass index is 25.77  kg/m².  Vital Signs (Most Recent):  Temp: 97.8 °F (36.6 °C) (09/22/22 1509)  Pulse: 73 (09/22/22 1600)  Resp: 18 (09/22/22 1509)  BP: 126/60 (09/22/22 1509)  SpO2: 97 % (09/22/22 1509) Vital Signs (24h Range):  Temp:  [96.6 °F (35.9 °C)-98.5 °F (36.9 °C)] 97.8 °F (36.6 °C)  Pulse:  [68-77] 73  Resp:  [17-20] 18  SpO2:  [93 %-98 %] 97 %  BP: (117-151)/(60-88) 126/60                          Physical Exam    Neurosurgery Physical Exam  General: AOx2, GCS [id number], higher order confusion  CNII-XII: Intact on fine exam, PERRL, visual fields grossly intact, EOMI, facial sensation preserved, no facial asymmetry, tongue midline, shoulder shrug equal, no pronator drift  Extremities: LUE contracted at baseline (4/5 motor), 5/5 motor otherwise, sensorium intact throughout, coordination intact throughout, DTRs 2+, no pathological reflexes, no sensory level present    Significant Labs:  Recent Labs   Lab 09/21/22  0344 09/22/22  0557    111*    136   K 3.8 3.4*    99   CO2 24 25   BUN 10 13   CREATININE 0.7 0.8   CALCIUM 9.3 9.5   MG 1.6 1.6     Recent Labs   Lab 09/21/22  0344 09/22/22  0556   WBC 9.15 10.42   HGB 12.7* 14.7   HCT 39.1* 44.7    172     Recent Labs   Lab 09/21/22  0344 09/22/22  0557   INR 2.5* 2.6*     Microbiology Results (last 7 days)       ** No results found for the last 168 hours. **          All pertinent labs from the last 24 hours have been reviewed.    Significant Diagnostics:  I have reviewed all pertinent imaging results/findings within the past 24 hours.    Assessment/Plan:     * Brain mass  Patient is a 77M with PMH of HTN, DM, polio, CAD (on ASA/Coumadin with pacemaker) who presents with multiple falls (including this morning). Patient was transferred from OSH for NSGY for possible brain mass.    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   --CTH with  contrast 9/20: interventricular diffuse enhancing mass in lateral vents and 3rd vent   --CT chest/abdo/pelvis 9/21: 1.1 cm solid solitary RUL nodule, additional micronodule, hepatosplenomegaly, nonsepcific lucent lesions in L ilum, L3 and L4   --Follow-up MRI w/wo contrast (pacemaker determined to be MRI compatible)  --SBP <160  --Na >135  --Dex 4q6  --PUD PPx while steroid treatment ongoing  --Continue to monitor clinically, notify NSGY immediately with any changes in neuro status        Bonita Fontaine MD  Neurosurgery  Charlie Bryan - Neurosurgery ([facility])

## 2022-09-22 NOTE — SUBJECTIVE & OBJECTIVE
Scheduled Meds:   aspirin  81 mg Oral Daily    atenoloL  50 mg Oral Daily    atorvastatin  40 mg Oral QHS    fluticasone furoate-vilanteroL  1 puff Inhalation Daily    furosemide  20 mg Oral Daily    insulin detemir U-100  5 Units Subcutaneous QHS    LIDOcaine  1 patch Transdermal Q24H    losartan-hydrochlorothiazide 100-25 mg  1 tablet Oral Daily    nicotine  1 patch Transdermal Daily    OLANZapine  10 mg Oral Daily    pantoprazole  40 mg Oral Daily    rOPINIRole  1 mg Oral QHS     Continuous Infusions:  PRN Meds:acetaminophen, albuterol-ipratropium, aluminum-magnesium hydroxide-simethicone, dextrose 10%, dextrose 10%, glucagon (human recombinant), glucose, glucose, HYDROcodone-acetaminophen, hydrOXYzine HCL, insulin aspart U-100, melatonin, naloxone, OLANZapine, polyethylene glycol, senna-docusate 8.6-50 mg, sodium chloride 0.9%, sodium chloride 0.9%    Review of patient's allergies indicates:   Allergen Reactions    Penicillins     Sulfa (sulfonamide antibiotics)         Past Medical History:   Diagnosis Date    Coronary artery disease     Diabetes mellitus     GERD (gastroesophageal reflux disease)     Hypertension      Past Surgical History:   Procedure Laterality Date    APPENDECTOMY      CARDIAC SURGERY      JOINT REPLACEMENT         Family History    None       Tobacco Use    Smoking status: Every Day     Packs/day: 1.00     Years: 30.00     Pack years: 30.00     Types: Cigarettes    Smokeless tobacco: Never   Substance and Sexual Activity    Alcohol use: Never    Drug use: Never    Sexual activity: Not on file     Review of Systems   Skin:  Positive for wound.     Objective:     Vital Signs (Most Recent):  Temp: 98.1 °F (36.7 °C) (09/22/22 0527)  Pulse: 70 (09/22/22 1156)  Resp: 18 (09/22/22 0806)  BP: (!) 146/88 (09/22/22 0903)  SpO2: 95 % (09/22/22 0806)   Vital Signs (24h Range):  Temp:  [96.6 °F (35.9 °C)-98.5 °F (36.9 °C)] 98.1 °F (36.7 °C)  Pulse:  [68-77] 70  Resp:  [17-20] 18  SpO2:  [93 %-98 %] 95  %  BP: (117-151)/(55-88) 146/88     Weight: 86.2 kg (190 lb)  Body mass index is 25.77 kg/m².  Physical Exam  Constitutional:       Appearance: Normal appearance.   Skin:     General: Skin is warm and dry.      Findings: Lesion present.   Neurological:      Mental Status: He is alert.       Laboratory:  All pertinent labs reviewed within the last 24 hours.    Diagnostic Results:  None

## 2022-09-22 NOTE — ASSESSMENT & PLAN NOTE
77-year-old male with extensive past medical history presenting from [facility] for Neurosurgery evaluation of a brain mass found on CT head.  Per patient wife, patient has had sudden memory problems in the last 2 weeks with recurrent falls, but without loss of consciousness, seizure activity, head trauma.  Baseline is normal without other limitations.  Patient also has been describing 1 year of headache.  Has seen neurology outpatient, and has been taking gabapentin and undergoing nerve block procedures in neck with little effect. 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     Concerns for metastatic disease, possibly lung as primary given extensive smoking history.    -- NSGY evaluated, appreciate recs   - no immediate surgical intervention planned   - pacemaker compatible with MRI, f/u MRI  -- CT scan for staging, lung nodule and bony lesion of the ilium and lumbar vertebral bodies  -- Plan for Bx depending on CT scan results  -- Neuro check q4h  -- Fall, Aspiration, Delirium precautions

## 2022-09-22 NOTE — PHARMACY MED REC
"Admission Medication History     The home medication history was taken by Kimmy Tello.    You may go to "Admission" then "Reconcile Home Medications" tabs to review and/or act upon these items.     The home medication list has been updated by the Pharmacy department.   Please read ALL comments highlighted in yellow.   Please address this information as you see fit.    Feel free to contact us if you have any questions or require assistance.        Medications listed below were obtained from: Patient/family  PTA Medications   Medication Sig    acetaminophen (TYLENOL) 325 MG tablet Take 650 mg by mouth every 6 (six) hours as needed for Pain.    aspirin (ECOTRIN) 81 MG EC tablet Take 1 tablet by mouth once daily.    atenoloL (TENORMIN) 50 MG tablet Take 50 mg by mouth once daily.    atorvastatin (LIPITOR) 10 MG tablet Take 10 mg by mouth every evening.    docusate sodium (COLACE) 250 MG capsule Take 250 mg by mouth 2 (two) times a day.    furosemide (LASIX) 20 MG tablet Take 20 mg by mouth once daily.    gabapentin (NEURONTIN) 300 MG capsule Take 300-900 mg by mouth daily    HYDROcodone-acetaminophen (NORCO)  mg per tablet Take 1 tablet by mouth every 8 (eight) hours as needed for Pain.    liraglutide 0.6 mg/0.1 mL, 18 mg/3 mL, subq PNIJ (VICTOZA 2-SEDRICK) 0.6 mg/0.1 mL (18 mg/3 mL) PnIj pen Inject 0.6 mg into the skin once daily.    losartan-hydrochlorothiazide 100-25 mg (HYZAAR) 100-25 mg per tablet Take 1 tablet by mouth once daily.    metFORMIN (GLUCOPHAGE) 500 MG tablet Take 500 mg by mouth 3 (three) times daily.    pantoprazole (PROTONIX) 40 MG tablet Take 40 mg by mouth once daily.    pioglitazone (ACTOS) 15 MG tablet Take 15 mg by mouth once daily.    rOPINIRole (REQUIP) 1 MG tablet Take 1 mg by mouth every evening.    warfarin (COUMADIN) 5 MG tablet Take 5 mg by mouth three days weekly and 7.5 mg on all other days           Kimmy Tello  [id number]            .        "

## 2022-09-22 NOTE — CONSULTS
"Charlie Bryan - Neurosurgery ([facility])  Skin Integrity CODI  Consult Note    Patient Name: Kieran Styles  MRN: [id number]  Admission Date: 9/20/2022  Hospital Length of Stay: 2 days  Attending Physician: Edy Fried MD  Primary Care Provider: Sony Holt DO     Consults  Subjective:     History of Present Illness:  Kieran Styles is a 77 year old male with PMHx of DM2, CAD s/p CABG x 2 1997, history of adenomatous colon polyps (07/2017) revealing 10 mm sessile serrated adenoma, GERD, multiple pacemaker replacements most recent 2021 for AF (on warfarin), HTN, HLD, chronic back pain, MARQUIS on CPAP presenting to [facility] ED from [facility] due to a 2 week history of sudden memory deficits, disorientation, confsuion (date, situation, short-term memory), and multiple falls w/o head trauma or LOC or seizure. Wife noticed that he was forgetting where he was, what he had been doing. In addition, he has been getting weaker and more unstable on his feet. Yesterday evening, he fell while going to the bathroom and hit his elbow. Transported to EMS to [facility], where they did a CT head 09/20 this AM. Was discharged from [facility] because they initially told the wife that the CT scan was "clear", wife brought him to PCP and cardiologist OP office later that morning, but was called back by [facility] stating that they had mis-read the CT head and that the CT head was remarkable for apparent mass in brain.  Patient was subsequently transferred via EMS to [facility].  [facility] staff informed the wife that they were concerned for possible cancer, and wanted him to see neurosurgery evaluation.  Patient wife denies history cancer to her knowledge.  Patient is an active smoker smoking 1-2 packs per day, and has smoked for the last 30 years.  Social alcohol use, no other illicit substances. Pt admitted to hospital medicine service with neurosurgery evaluation. Skin " integrity CODI consulted for evaluation of skin injury.      Scheduled Meds:   aspirin  81 mg Oral Daily    atenoloL  50 mg Oral Daily    atorvastatin  40 mg Oral QHS    fluticasone furoate-vilanteroL  1 puff Inhalation Daily    furosemide  20 mg Oral Daily    insulin detemir U-100  5 Units Subcutaneous QHS    LIDOcaine  1 patch Transdermal Q24H    losartan-hydrochlorothiazide 100-25 mg  1 tablet Oral Daily    nicotine  1 patch Transdermal Daily    OLANZapine  10 mg Oral Daily    pantoprazole  40 mg Oral Daily    rOPINIRole  1 mg Oral QHS     Continuous Infusions:  PRN Meds:acetaminophen, albuterol-ipratropium, aluminum-magnesium hydroxide-simethicone, dextrose 10%, dextrose 10%, glucagon (human recombinant), glucose, glucose, HYDROcodone-acetaminophen, hydrOXYzine HCL, insulin aspart U-100, melatonin, naloxone, OLANZapine, polyethylene glycol, senna-docusate 8.6-50 mg, sodium chloride 0.9%, sodium chloride 0.9%    Review of patient's allergies indicates:   Allergen Reactions    Penicillins     Sulfa (sulfonamide antibiotics)         Past Medical History:   Diagnosis Date    Coronary artery disease     Diabetes mellitus     GERD (gastroesophageal reflux disease)     Hypertension      Past Surgical History:   Procedure Laterality Date    APPENDECTOMY      CARDIAC SURGERY      JOINT REPLACEMENT         Family History    None       Tobacco Use    Smoking status: Every Day     Packs/day: 1.00     Years: 30.00     Pack years: 30.00     Types: Cigarettes    Smokeless tobacco: Never   Substance and Sexual Activity    Alcohol use: Never    Drug use: Never    Sexual activity: Not on file     Review of Systems   Skin:  Positive for wound.     Objective:     Vital Signs (Most Recent):  Temp: 98.1 °F (36.7 °C) (09/22/22 0527)  Pulse: 70 (09/22/22 1156)  Resp: 18 (09/22/22 0806)  BP: (!) 146/88 (09/22/22 0903)  SpO2: 95 % (09/22/22 0806)   Vital Signs (24h Range):  Temp:  [96.6 °F (35.9 °C)-98.5 °F (36.9  °C)] 98.1 °F (36.7 °C)  Pulse:  [68-77] 70  Resp:  [17-20] 18  SpO2:  [93 %-98 %] 95 %  BP: (117-151)/(55-88) 146/88     Weight: 86.2 kg (190 lb)  Body mass index is 25.77 kg/m².  Physical Exam  Constitutional:       Appearance: Normal appearance.   Skin:     General: Skin is warm and dry.      Findings: Lesion present.   Neurological:      Mental Status: He is alert.       Laboratory:  All pertinent labs reviewed within the last 24 hours.    Diagnostic Results:  None        Assessment/Plan:          CODI Skin Integrity Evaluation    Skin Integrity CODI evaluation of patient as part of the comprehensive skin care team.   He has been admitted for 2 days. Skin injury was noted on 9/21/22. POA yes.                Dermatitis associated with moisture  - consult received for evaluation of skin breakdown.  - pt presented to [facility] from [facility] due to a 2 week history of sudden memory deficits, disorientation, confsuion (date, situation, short-term memory), and multiple falls.  - redness and irritation noted to sacrum and gluteal cleft likely due to moisture dermatitis.  - large purple bruise with yellow periwound noted to right hip/buttocks likely from multiple falls.  - continue Triad bid/prn soiling to sacrum.  - team at bedside cleaning pt and placing waffle overlay to bed.  - Taran surface.  - wedge/pillows for offloading.  - turn q2h.  - nursing to maintain pressure injury prevention measures and continue wound care per orders.         Thank you for your consult. I will follow-up with patient. Please contact us if you have any additional questions.       Teresita Randall NP  Skin Integrity CODI  Charlie Bryan - Neurosurgery ([facility])

## 2022-09-23 ENCOUNTER — DOCUMENTATION ONLY (OUTPATIENT)
Dept: ELECTROPHYSIOLOGY | Facility: CLINIC | Age: 77
End: 2022-09-23
Payer: MEDICARE

## 2022-09-23 LAB
ALBUMIN SERPL BCP-MCNC: 3.8 G/DL (ref 3.5–5.2)
ALP SERPL-CCNC: 55 U/L (ref 55–135)
ALT SERPL W/O P-5'-P-CCNC: 9 U/L (ref 10–44)
ANION GAP SERPL CALC-SCNC: 13 MMOL/L (ref 8–16)
AST SERPL-CCNC: 14 U/L (ref 10–40)
BASOPHILS # BLD AUTO: 0.02 K/UL (ref 0–0.2)
BASOPHILS NFR BLD: 0.2 % (ref 0–1.9)
BILIRUB SERPL-MCNC: 1.6 MG/DL (ref 0.1–1)
BUN SERPL-MCNC: 17 MG/DL (ref 8–23)
CALCIUM SERPL-MCNC: 9.9 MG/DL (ref 8.7–10.5)
CHLORIDE SERPL-SCNC: 100 MMOL/L (ref 95–110)
CO2 SERPL-SCNC: 24 MMOL/L (ref 23–29)
CREAT SERPL-MCNC: 0.8 MG/DL (ref 0.5–1.4)
DIFFERENTIAL METHOD: ABNORMAL
EOSINOPHIL # BLD AUTO: 0.2 K/UL (ref 0–0.5)
EOSINOPHIL NFR BLD: 1.3 % (ref 0–8)
ERYTHROCYTE [DISTWIDTH] IN BLOOD BY AUTOMATED COUNT: 16.5 % (ref 11.5–14.5)
EST. GFR  (NO RACE VARIABLE): >60 ML/MIN/1.73 M^2
FOLATE SERPL-MCNC: 14.5 NG/ML (ref 4–24)
GLUCOSE SERPL-MCNC: 130 MG/DL (ref 70–110)
HCT VFR BLD AUTO: 45.9 % (ref 40–54)
HGB BLD-MCNC: 15 G/DL (ref 14–18)
IMM GRANULOCYTES # BLD AUTO: 0.03 K/UL (ref 0–0.04)
IMM GRANULOCYTES NFR BLD AUTO: 0.3 % (ref 0–0.5)
INR PPP: 2.9 (ref 0.8–1.2)
LYMPHOCYTES # BLD AUTO: 1.2 K/UL (ref 1–4.8)
LYMPHOCYTES NFR BLD: 10.6 % (ref 18–48)
MAGNESIUM SERPL-MCNC: 1.7 MG/DL (ref 1.6–2.6)
MCH RBC QN AUTO: 30 PG (ref 27–31)
MCHC RBC AUTO-ENTMCNC: 32.7 G/DL (ref 32–36)
MCV RBC AUTO: 92 FL (ref 82–98)
MONOCYTES # BLD AUTO: 1.1 K/UL (ref 0.3–1)
MONOCYTES NFR BLD: 9.3 % (ref 4–15)
NEUTROPHILS # BLD AUTO: 8.8 K/UL (ref 1.8–7.7)
NEUTROPHILS NFR BLD: 78.3 % (ref 38–73)
NRBC BLD-RTO: 0 /100 WBC
PHOSPHATE SERPL-MCNC: 3.6 MG/DL (ref 2.7–4.5)
PLATELET # BLD AUTO: 169 K/UL (ref 150–450)
PMV BLD AUTO: 11.5 FL (ref 9.2–12.9)
POCT GLUCOSE: 134 MG/DL (ref 70–110)
POCT GLUCOSE: 179 MG/DL (ref 70–110)
POCT GLUCOSE: 188 MG/DL (ref 70–110)
POTASSIUM SERPL-SCNC: 3.7 MMOL/L (ref 3.5–5.1)
PROT SERPL-MCNC: 7.7 G/DL (ref 6–8.4)
PROTHROMBIN TIME: 28.9 SEC (ref 9–12.5)
RBC # BLD AUTO: 5 M/UL (ref 4.6–6.2)
RPR SER QL: NORMAL
SODIUM SERPL-SCNC: 137 MMOL/L (ref 136–145)
TSH SERPL DL<=0.005 MIU/L-ACNC: 0.47 UIU/ML (ref 0.4–4)
VIT B12 SERPL-MCNC: 338 PG/ML (ref 210–950)
WBC # BLD AUTO: 11.26 K/UL (ref 3.9–12.7)

## 2022-09-23 PROCEDURE — 82746 ASSAY OF FOLIC ACID SERUM: CPT | Performed by: HOSPITALIST

## 2022-09-23 PROCEDURE — 25000003 PHARM REV CODE 250: Performed by: EMERGENCY MEDICINE

## 2022-09-23 PROCEDURE — 25000003 PHARM REV CODE 250

## 2022-09-23 PROCEDURE — 82607 VITAMIN B-12: CPT | Performed by: HOSPITALIST

## 2022-09-23 PROCEDURE — 99232 PR SUBSEQUENT HOSPITAL CARE,LEVL II: ICD-10-PCS | Mod: ,,, | Performed by: NEUROLOGICAL SURGERY

## 2022-09-23 PROCEDURE — 84443 ASSAY THYROID STIM HORMONE: CPT | Performed by: HOSPITALIST

## 2022-09-23 PROCEDURE — 99233 PR SUBSEQUENT HOSPITAL CARE,LEVL III: ICD-10-PCS | Mod: GC,,, | Performed by: HOSPITALIST

## 2022-09-23 PROCEDURE — 27000190 HC CPAP FULL FACE MASK W/VALVE

## 2022-09-23 PROCEDURE — 36415 COLL VENOUS BLD VENIPUNCTURE: CPT

## 2022-09-23 PROCEDURE — 25000242 PHARM REV CODE 250 ALT 637 W/ HCPCS

## 2022-09-23 PROCEDURE — 85610 PROTHROMBIN TIME: CPT

## 2022-09-23 PROCEDURE — 86592 SYPHILIS TEST NON-TREP QUAL: CPT | Performed by: HOSPITALIST

## 2022-09-23 PROCEDURE — 94640 AIRWAY INHALATION TREATMENT: CPT

## 2022-09-23 PROCEDURE — 85025 COMPLETE CBC W/AUTO DIFF WBC: CPT

## 2022-09-23 PROCEDURE — 84100 ASSAY OF PHOSPHORUS: CPT

## 2022-09-23 PROCEDURE — 63600175 PHARM REV CODE 636 W HCPCS

## 2022-09-23 PROCEDURE — 83735 ASSAY OF MAGNESIUM: CPT

## 2022-09-23 PROCEDURE — 99900035 HC TECH TIME PER 15 MIN (STAT)

## 2022-09-23 PROCEDURE — 99232 SBSQ HOSP IP/OBS MODERATE 35: CPT | Mod: ,,, | Performed by: NEUROLOGICAL SURGERY

## 2022-09-23 PROCEDURE — 27000221 HC OXYGEN, UP TO 24 HOURS

## 2022-09-23 PROCEDURE — 80053 COMPREHEN METABOLIC PANEL: CPT

## 2022-09-23 PROCEDURE — 94660 CPAP INITIATION&MGMT: CPT

## 2022-09-23 PROCEDURE — 99233 SBSQ HOSP IP/OBS HIGH 50: CPT | Mod: GC,,, | Performed by: HOSPITALIST

## 2022-09-23 PROCEDURE — 11000001 HC ACUTE MED/SURG PRIVATE ROOM

## 2022-09-23 PROCEDURE — 94761 N-INVAS EAR/PLS OXIMETRY MLT: CPT

## 2022-09-23 PROCEDURE — S4991 NICOTINE PATCH NONLEGEND: HCPCS

## 2022-09-23 PROCEDURE — 87040 BLOOD CULTURE FOR BACTERIA: CPT | Mod: 59

## 2022-09-23 RX ORDER — DEXAMETHASONE SODIUM PHOSPHATE 4 MG/ML
4 INJECTION, SOLUTION INTRA-ARTICULAR; INTRALESIONAL; INTRAMUSCULAR; INTRAVENOUS; SOFT TISSUE EVERY 6 HOURS
Status: DISCONTINUED | OUTPATIENT
Start: 2022-09-23 | End: 2022-09-29

## 2022-09-23 RX ORDER — HEPARIN SODIUM 5000 [USP'U]/ML
5000 INJECTION, SOLUTION INTRAVENOUS; SUBCUTANEOUS EVERY 8 HOURS
Status: DISCONTINUED | OUTPATIENT
Start: 2022-09-23 | End: 2022-09-24

## 2022-09-23 RX ADMIN — FUROSEMIDE 20 MG: 20 TABLET ORAL at 09:09

## 2022-09-23 RX ADMIN — DEXAMETHASONE SODIUM PHOSPHATE 4 MG: 4 INJECTION INTRA-ARTICULAR; INTRALESIONAL; INTRAMUSCULAR; INTRAVENOUS; SOFT TISSUE at 11:09

## 2022-09-23 RX ADMIN — LIDOCAINE 1 PATCH: 50 PATCH CUTANEOUS at 12:09

## 2022-09-23 RX ADMIN — PANTOPRAZOLE SODIUM 40 MG: 40 TABLET, DELAYED RELEASE ORAL at 09:09

## 2022-09-23 RX ADMIN — HYDROCODONE BITARTRATE AND ACETAMINOPHEN 1 TABLET: 10; 325 TABLET ORAL at 08:09

## 2022-09-23 RX ADMIN — HEPARIN SODIUM 5000 UNITS: 5000 INJECTION INTRAVENOUS; SUBCUTANEOUS at 08:09

## 2022-09-23 RX ADMIN — SENNOSIDES AND DOCUSATE SODIUM 1 TABLET: 50; 8.6 TABLET ORAL at 08:09

## 2022-09-23 RX ADMIN — MELATONIN TAB 3 MG 6 MG: 3 TAB at 08:09

## 2022-09-23 RX ADMIN — HYDROXYZINE HYDROCHLORIDE 25 MG: 25 TABLET, FILM COATED ORAL at 08:09

## 2022-09-23 RX ADMIN — HYDROCODONE BITARTRATE AND ACETAMINOPHEN 1 TABLET: 10; 325 TABLET ORAL at 09:09

## 2022-09-23 RX ADMIN — Medication 1 PATCH: at 09:09

## 2022-09-23 RX ADMIN — ASPIRIN 81 MG: 81 TABLET, COATED ORAL at 09:09

## 2022-09-23 RX ADMIN — INSULIN DETEMIR 5 UNITS: 100 INJECTION, SOLUTION SUBCUTANEOUS at 08:09

## 2022-09-23 RX ADMIN — ATORVASTATIN CALCIUM 40 MG: 40 TABLET, FILM COATED ORAL at 08:09

## 2022-09-23 RX ADMIN — FLUTICASONE FUROATE AND VILANTEROL TRIFENATATE 1 PUFF: 100; 25 POWDER RESPIRATORY (INHALATION) at 10:09

## 2022-09-23 RX ADMIN — ACETAMINOPHEN 650 MG: 325 TABLET ORAL at 06:09

## 2022-09-23 RX ADMIN — ROPINIROLE HYDROCHLORIDE 1 MG: 1 TABLET, FILM COATED ORAL at 08:09

## 2022-09-23 RX ADMIN — ATENOLOL 50 MG: 25 TABLET ORAL at 09:09

## 2022-09-23 RX ADMIN — ACETAMINOPHEN 650 MG: 325 TABLET ORAL at 03:09

## 2022-09-23 NOTE — PLAN OF CARE
Problem: Adult Inpatient Plan of Care  Goal: Plan of Care Review  Outcome: Ongoing, Progressing  Flowsheets (Taken 9/23/2022 0402)  Plan of Care Reviewed With:   patient   spouse     Problem: Fall Injury Risk  Goal: Absence of Fall and Fall-Related Injury  Outcome: Ongoing, Progressing  Intervention: Promote Injury-Free Environment  Flowsheets (Taken 9/23/2022 0402)  Safety Promotion/Fall Prevention:   assistive device/personal item within reach   bed alarm set   Fall Risk reviewed with patient/family   family to remain at bedside   medications reviewed   nonskid shoes/socks when out of bed   /camera at bedside   side rails raised x 3   instructed to call staff for mobility     Problem: Restraint, Nonbehavioral (Nonviolent)  Goal: Absence of Harm or Injury  Outcome: Ongoing, Progressing     Problem: Diabetes Comorbidity  Goal: Blood Glucose Level Within Targeted Range  Outcome: Ongoing, Progressing     Problem: Skin Injury Risk Increased  Goal: Skin Health and Integrity  Outcome: Ongoing, Progressing     Problem: Impaired Wound Healing  Goal: Optimal Wound Healing  Outcome: Ongoing, Progressing  Intervention: Promote Wound Healing  Flowsheets (Taken 9/23/2022 0402)  Activity Management: Rolling - L1

## 2022-09-23 NOTE — PROGRESS NOTES
"Charlie Bryan - Neurosurgery ([facility])  [facility] Medicine  Progress Note    Patient Name: Kieran Styles  MRN: [id number]  Patient Class: IP- Inpatient   Admission Date: 9/20/2022  Length of Stay: 3 days  Attending Physician: Edy Fried MD  Primary Care Provider: Sony Holt DO        Subjective:     Principal Problem:Brain mass        HPI:  78 yo PMHx DM2, CAD s/p CABG x 2 1997, history of adenomatous colon polyps (07/2017) revealing 10 mm sessile serrated adenoma, GERD, multiple pacemaker replacements most recent 2021 for AF (on warfarin), HTN, HLD, chronic back pain, MARQUIS on CPAP presenting to [facility] ED from [facility] due to a 2 week history of sudden memory deficits, disorientation, confsuion (date, situation, short-term memory), and multiple falls w/o head trauma or LOC or seizure. Wife noticed that he was forgetting where he was, what he had been doing. In addition, he has been getting weaker and more unstable on his feet. Yesterday evening, he fell while going to the bathroom and hit his elbow. Transported to EMS to [facility], where they did a CT head 09/20 this AM. Was discharged from [facility] because they initially told the wife that the CT scan was "clear", wife brought him to PCP and cardiologist OP office later that morning, but was called back by [facility] stating that they had mis-read the CT head and that the CT head was remarkable for apparent mass in brain.  Patient was subsequently transferred via EMS to [facility].  [facility] staff informed the wife that they were concerned for possible cancer, and wanted him to see neurosurgery evaluation.  Patient wife denies history cancer to her knowledge.  Patient is an active smoker smoking 1-2 packs per day, and has smoked for the last 30 years.  Social alcohol use, no other illicit substances.    On my initial evaluation, patient is conversational; however, he is only oriented to " self and time.  He exhibits short-term memory loss, and is not sure why he is here.  Overall, patient is a poor historian.  Family was not at bedside.  This history of present illness was obtained via telephone conversation with his wife ([phone] Donna Styles).  ROS includes denies chest pain, abdominal pain, nausea, vomiting, shortness of breath, cough, diarrhea, constipation, blood per rectum, fevers, chills, muscle aches, new weakness, new sensory changes.  Patient reports history of chronic lower back pain.    ED course:  Neurosurgery evaluated at [facility], concerns for metastasis.  No plans for surgical intervention at this time.  Subsequently pan CT scan pending.    PMHx/PSHx:  DM2, CAD s/p CABG x 2 , multiple pacemaker replacements most recent  for AF (on warfarin), HTN, HLD, chronic back pain, MARQUIS on CPAP    FMHx: Mother with breast cancer, . Sister NAFLD w/ cirrhosis, .      Overview/Hospital Course:  Patient presenting with sudden memory deficits, AMS, and multiple falls who was transferred from [facility] to [facility] for NSGY evaluation of the CT head () finding of a brain mass concerning for cancer. No plans for surgical intervention at this time. CT A/P with con demonstrated a pulmonary nodule, bony lesions in left ilium, L3 and L4 vertebral bodies concerning for metastasis. Patient's pacemaker is compatible with MRI; however, the leads are not therefore patient cannot undergo MRI. Will follow up with NSGY recommendations on how to proceed.      Interval History: Patient agitated overnight requiring a PRN olanzapine 5mg and soft restraints to prevent pulling out IV. He reports continued chronic low back pain today.     Review of Systems   Constitutional:  Negative for chills and fever.   HENT:  Negative for rhinorrhea, sneezing, sore throat and trouble swallowing.    Respiratory:  Negative for cough and shortness of breath.    Cardiovascular:  Negative for chest  pain and palpitations.   Gastrointestinal:  Negative for constipation, diarrhea, nausea and vomiting.   Genitourinary:  Negative for dysuria, frequency and urgency.   Musculoskeletal:  Positive for arthralgias, back pain and neck pain. Negative for myalgias.   Skin:  Negative for rash and wound.   Neurological:  Negative for syncope, facial asymmetry, speech difficulty, weakness, light-headedness and headaches.   Psychiatric/Behavioral:  Positive for agitation, confusion and decreased concentration.    Objective:     Vital Signs (Most Recent):  Temp: 98.1 °F (36.7 °C) (09/23/22 0700)  Pulse: 70 (09/23/22 0900)  Resp: 18 (09/23/22 0954)  BP: 125/66 (09/23/22 0700)  SpO2: 97 % (09/23/22 0700) Vital Signs (24h Range):  Temp:  [97.7 °F (36.5 °C)-98.2 °F (36.8 °C)] 98.1 °F (36.7 °C)  Pulse:  [70-84] 70  Resp:  [18-20] 18  SpO2:  [94 %-97 %] 97 %  BP: (110-131)/(54-73) 125/66     Weight: 86.2 kg (190 lb)  Body mass index is 25.77 kg/m².  No intake or output data in the 24 hours ending 09/23/22 0959     Physical Exam  Vitals and nursing note reviewed.   Constitutional:       General: He is not in acute distress.     Appearance: He is obese. He is not ill-appearing or diaphoretic.   HENT:      Right Ear: External ear normal.      Left Ear: External ear normal.      Nose: Nose normal.   Eyes:      General:         Right eye: No discharge.         Left eye: No discharge.      Conjunctiva/sclera: Conjunctivae normal.   Cardiovascular:      Rate and Rhythm: Normal rate and regular rhythm.      Heart sounds: Normal heart sounds. No murmur heard.  Pulmonary:      Effort: Pulmonary effort is normal. No respiratory distress.      Breath sounds: Normal breath sounds. No wheezing or rales.   Abdominal:      General: Abdomen is flat. There is no distension.      Palpations: There is no mass.      Tenderness: There is no abdominal tenderness.   Musculoskeletal:         General: No swelling, tenderness or deformity. Normal range of  motion.      Cervical back: Normal range of motion and neck supple.      Right lower leg: No edema.      Left lower leg: No edema.   Skin:     General: Skin is warm.      Coloration: Skin is not jaundiced.      Findings: No bruising.   Neurological:      Mental Status: He is alert.      Cranial Nerves: No cranial nerve deficit.      Motor: Weakness (left sided, LUE weak 2/2 h/o Polio) present.      Comments: Oriented to person and year but not to place  Poor memory recall       Significant Labs: All pertinent labs within the past 24 hours have been reviewed.    Significant Imaging: I have reviewed all pertinent imaging results/findings within the past 24 hours.      Assessment/Plan:      * Brain mass  77-year-old male with extensive past medical history presenting from [facility] for Neurosurgery evaluation of a brain mass found on CT head.  Per patient wife, patient has had sudden memory problems in the last 2 weeks with recurrent falls, but without loss of consciousness, seizure activity, head trauma.  Baseline is normal without other limitations.  Patient also has been describing 1 year of headache.  Has seen neurology outpatient, and has been taking gabapentin and undergoing nerve block procedures in neck with little effect. 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     Concerns for metastatic disease, possibly lung as primary given extensive smoking history.    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  - CTH with contrast 9/20: interventricular diffuse enhancing mass in lateral vents and 3rd vent  - CT chest/abdo/pelvis 9/21: 1.1 cm solid solitary RUL nodule, additional micronodule, hepatosplenomegaly, nonsepcific lucent lesions in L ilum, L3 and L4    Patient's pacemaker leads are NOT compatible with MRI    Plan:   -- NSGY evaluated, appreciate recs  --SBP <160  --Na >135  --Dex 4mg  q6  --PUD PPx while steroid treatment ongoing, patient on home pantoprazole 40mg qd  --Continue to monitor clinically, notify NSGY immediately with any changes in neuro status  -- no immediate surgical intervention planned  -- Neuro check q4h  -- Fall, Aspiration, Delirium precautions    Dermatitis associated with moisture  Wound care consulted, appreciate recs      COPD (chronic obstructive pulmonary disease)  On Anoro. Rx of singular but not taking.    -- Continue formulary equivalent of inhaler  -- PRN duoneb  -- SpO2 goal 88-92       Restless leg  -- Continue home ropinirole       GERD (gastroesophageal reflux disease)  -- Continue home PPI      Chronic back pain  -- MM pain regimen PRN   Tylenol PRN   Home Narco PRN   Lidocaine patches      Hypertension  Home medications: atenolol 50 qd, losartan-HCTZ 100-25 mg qd    -- Continue home medications as tolerated      MARQUIS on CPAP  -- Maintain nightly CPAP  -- Patient wife to bring home CPAP      Pacemaker  Pacemaker is compatible with MRI but the leads are NOT compatible with MRI      Atrial fibrillation  Chronic anticoagulation use w/ warfarin  S/P pacemaker with multiple replacements    Home meds: Warfarin 7.5 qd, atenolol 50 qd  [id number]      -- Discontinued home warfarin in the case of NSGY intervention, plan to introduce heparin after potential intervention  -- Continue home ASA, statin, and atenolol  -- Daily INR  -- Cardiac telemetry  -- Maintain K > 4, Mg > 2, Ca wnl; replete PRN  -- STAT cardiology consult if hemodynamic instability for DCCV evaluation        Hyperlipidemia  - See CAD involving coronary bypass graft      Coronary artery disease involving coronary bypass graft  CABG x 2 1997  Dyslipidemia    -- Continue ASA 81  -- Continue HI statin    Type 2 diabetes mellitus, without long-term current use of insulin  Patient's FSGs are controlled on current medication regimen.  Last A1c reviewed- No results found for: LABA1C, HGBA1C  Most recent  fingerstick glucose reviewed- Recent Labs   Lab 09/20/22  1603   POCTGLUCOSE 113*     Current correctional scale  Medium  Maintain anti-hyperglycemic dose as follows-   Antihyperglycemics (From admission, onward)    None      Home medications: metformin, pioglitazone, Victoza    -- Hold oral hyperglycemics  -- SSI, POCT BG qACHS; titrate basal/bolus regimen PRN to 140-180 goal        VTE Risk Mitigation (From admission, onward)         Ordered     Reason for No Pharmacological VTE Prophylaxis  Once        Question:  Reasons:  Answer:  Already adequately anticoagulated on oral Anticoagulants    09/20/22 2152     IP VTE HIGH RISK PATIENT  Once         09/20/22 2152     Place sequential compression device  Until discontinued         09/20/22 2039                Discharge Planning   ANITA: 9/27/2022     Code Status: Full Code   Is the patient medically ready for discharge?: No    Reason for patient still in hospital (select all that apply): Consult recommendations  Discharge Plan A: Other (TBD)                  Sarah Llanos MD  Department of Hospital Medicine   [facility] - Neurosurgery ([facility])

## 2022-09-23 NOTE — ASSESSMENT & PLAN NOTE
Patient is a 77M with PMH of HTN, DM, polio, CAD (on ASA/Coumadin with pacemaker) who presents with multiple falls (including this morning). Patient was transferred from OSH for NSGY for possible brain mass.    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

## 2022-09-23 NOTE — PROGRESS NOTES
Received a call from Corrinne in the MRI Department in relation to this patient needing to have a MRI and has a [employer] Pacemaker.  Please see information below as it relates to patient's Device being MRI compatible/conditional.  To meet protocol the Pacemaker/ICD must have been implanted no less than 6 weeks prior to scheduled date of Scan.  ICD/PPM and leads must be from same .  MRI informed ordering MD must input a Cardiac Device Check in clinic and hospital order, patient must have an xray within 6 months or less prior to MRI reprogramming.  Chest xray to be reviewed by Radiologist.    [employer] MRI PM 2272 -imp date 8/12/21  RA Lead: [id number]  RV Lead: [id number]  Both leads were implanted on 3/17/1997.    This Pt's Leads are NOT MR-Conditional; Pt is NOT able to have a MRI.

## 2022-09-23 NOTE — SUBJECTIVE & OBJECTIVE
Interval History: 9/22: Patient slept well last night. Alert and oriented to self, location and year today. Wife at bedside. Cannot get MRI per  team because although pacemaker is MRI compatible, his old leads are not. Plan for repeat CTH on Monday.    Medications:  Continuous Infusions:  Scheduled Meds:   aspirin  81 mg Oral Daily    atenoloL  50 mg Oral Daily    atorvastatin  40 mg Oral QHS    dexamethasone  4 mg Intravenous Q6H    fluticasone furoate-vilanteroL  1 puff Inhalation Daily    furosemide  20 mg Oral Daily    insulin detemir U-100  5 Units Subcutaneous QHS    LIDOcaine  1 patch Transdermal Q24H    losartan-hydrochlorothiazide 100-25 mg  1 tablet Oral Daily    nicotine  1 patch Transdermal Daily    OLANZapine  10 mg Oral Daily    pantoprazole  40 mg Oral Daily    rOPINIRole  1 mg Oral QHS     PRN Meds:acetaminophen, albuterol-ipratropium, aluminum-magnesium hydroxide-simethicone, dextrose 10%, dextrose 10%, glucagon (human recombinant), glucose, glucose, HYDROcodone-acetaminophen, hydrOXYzine HCL, insulin aspart U-100, melatonin, naloxone, OLANZapine, polyethylene glycol, senna-docusate 8.6-50 mg, sodium chloride 0.9%, sodium chloride 0.9%     Review of Systems  Objective:     Weight: 86.2 kg (190 lb)  Body mass index is 25.77 kg/m².  Vital Signs (Most Recent):  Temp: 98.1 °F (36.7 °C) (09/23/22 0700)  Pulse: 77 (09/23/22 1013)  Resp: 14 (09/23/22 1013)  BP: 125/66 (09/23/22 0700)  SpO2: 97 % (09/23/22 1013) Vital Signs (24h Range):  Temp:  [97.7 °F (36.5 °C)-98.2 °F (36.8 °C)] 98.1 °F (36.7 °C)  Pulse:  [70-84] 77  Resp:  [14-20] 14  SpO2:  [94 %-97 %] 97 %  BP: (110-131)/(54-73) 125/66                          Physical Exam    Neurosurgery Physical Exam  General: AOx3, GCS [id number], higher order confusion  CNII-XII: Intact on fine exam, PERRL, visual fields grossly intact, EOMI, facial sensation preserved, no facial asymmetry, tongue midline, shoulder shrug equal, no pronator drift  Extremities: KLESI  contracted at baseline (4/5 motor), 5/5 motor otherwise, sensorium intact throughout, coordination intact throughout, DTRs 2+, no pathological reflexes, no sensory level present    Significant Labs:  Recent Labs   Lab 09/22/22  0557 09/23/22  0610   * 130*    137   K 3.4* 3.7   CL 99 100   CO2 25 24   BUN 13 17   CREATININE 0.8 0.8   CALCIUM 9.5 9.9   MG 1.6 1.7       Recent Labs   Lab 09/22/22  0556 09/23/22  0610   WBC 10.42 11.26   HGB 14.7 15.0   HCT 44.7 45.9    169       Recent Labs   Lab 09/22/22  0557 09/23/22  0610   INR 2.6* 2.9*       Microbiology Results (last 7 days)       ** No results found for the last 168 hours. **          All pertinent labs from the last 24 hours have been reviewed.    Significant Diagnostics:  I have reviewed all pertinent imaging results/findings within the past 24 hours.

## 2022-09-23 NOTE — SUBJECTIVE & OBJECTIVE
Interval History: Patient agitated overnight requiring a PRN olanzapine 5mg and soft restraints to prevent pulling out IV. He reports continued chronic low back pain today.     Review of Systems   Constitutional:  Negative for chills and fever.   HENT:  Negative for rhinorrhea, sneezing, sore throat and trouble swallowing.    Respiratory:  Negative for cough and shortness of breath.    Cardiovascular:  Negative for chest pain and palpitations.   Gastrointestinal:  Negative for constipation, diarrhea, nausea and vomiting.   Genitourinary:  Negative for dysuria, frequency and urgency.   Musculoskeletal:  Positive for arthralgias, back pain and neck pain. Negative for myalgias.   Skin:  Negative for rash and wound.   Neurological:  Negative for syncope, facial asymmetry, speech difficulty, weakness, light-headedness and headaches.   Psychiatric/Behavioral:  Positive for agitation, confusion and decreased concentration.    Objective:     Vital Signs (Most Recent):  Temp: 98.1 °F (36.7 °C) (09/23/22 0700)  Pulse: 70 (09/23/22 0900)  Resp: 18 (09/23/22 0954)  BP: 125/66 (09/23/22 0700)  SpO2: 97 % (09/23/22 0700) Vital Signs (24h Range):  Temp:  [97.7 °F (36.5 °C)-98.2 °F (36.8 °C)] 98.1 °F (36.7 °C)  Pulse:  [70-84] 70  Resp:  [18-20] 18  SpO2:  [94 %-97 %] 97 %  BP: (110-131)/(54-73) 125/66     Weight: 86.2 kg (190 lb)  Body mass index is 25.77 kg/m².  No intake or output data in the 24 hours ending 09/23/22 0959     Physical Exam  Vitals and nursing note reviewed.   Constitutional:       General: He is not in acute distress.     Appearance: He is obese. He is not ill-appearing or diaphoretic.   HENT:      Right Ear: External ear normal.      Left Ear: External ear normal.      Nose: Nose normal.   Eyes:      General:         Right eye: No discharge.         Left eye: No discharge.      Conjunctiva/sclera: Conjunctivae normal.   Cardiovascular:      Rate and Rhythm: Normal rate and regular rhythm.      Heart sounds:  Normal heart sounds. No murmur heard.  Pulmonary:      Effort: Pulmonary effort is normal. No respiratory distress.      Breath sounds: Normal breath sounds. No wheezing or rales.   Abdominal:      General: Abdomen is flat. There is no distension.      Palpations: There is no mass.      Tenderness: There is no abdominal tenderness.   Musculoskeletal:         General: No swelling, tenderness or deformity. Normal range of motion.      Cervical back: Normal range of motion and neck supple.      Right lower leg: No edema.      Left lower leg: No edema.   Skin:     General: Skin is warm.      Coloration: Skin is not jaundiced.      Findings: No bruising.   Neurological:      Mental Status: He is alert.      Cranial Nerves: No cranial nerve deficit.      Motor: Weakness (left sided, LUE weak 2/2 h/o Polio) present.      Comments: Oriented to person and year but not to place  Poor memory recall       Significant Labs: All pertinent labs within the past 24 hours have been reviewed.    Significant Imaging: I have reviewed all pertinent imaging results/findings within the past 24 hours.

## 2022-09-23 NOTE — PROGRESS NOTES
[facility] - Neurosurgery ([facility]  Neurosurgery  Progress Note    Subjective:     History of Present Illness: Patient is a 77M with PMH of HTN, DM, polio, CAD (on ASA/Coumadin) who presents with multiple falls (including this morning). Patient was transferred from OSH for NSGY for possible brain mass.    Patient has a pacemaker so it is uncertain whether or not he can get an MRI. He is originally from [facility] and was transferred here for care. Patient denies head trauma with his fall this morning. He notes that he has started using a walker in the past few weeks. Patient complains of L leg pain, likely related to his fall. 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      Post-Op Info:  * No surgery found *         Interval History: 9/22: Patient slept well last night. Alert and oriented to self, location and year today. Wife at bedside. Cannot get MRI per  team because although pacemaker is MRI compatible, his old leads are not. Plan for repeat CTH on Monday.    Medications:  Continuous Infusions:  Scheduled Meds:   aspirin  81 mg Oral Daily    atenoloL  50 mg Oral Daily    atorvastatin  40 mg Oral QHS    dexamethasone  4 mg Intravenous Q6H    fluticasone furoate-vilanteroL  1 puff Inhalation Daily    furosemide  20 mg Oral Daily    insulin detemir U-100  5 Units Subcutaneous QHS    LIDOcaine  1 patch Transdermal Q24H    losartan-hydrochlorothiazide 100-25 mg  1 tablet Oral Daily    nicotine  1 patch Transdermal Daily    OLANZapine  10 mg Oral Daily    pantoprazole  40 mg Oral Daily    rOPINIRole  1 mg Oral QHS     PRN Meds:acetaminophen, albuterol-ipratropium, aluminum-magnesium hydroxide-simethicone, dextrose 10%, dextrose 10%, glucagon (human recombinant), glucose, glucose, HYDROcodone-acetaminophen, hydrOXYzine HCL, insulin aspart U-100, melatonin, naloxone, OLANZapine, polyethylene glycol, senna-docusate 8.6-50 mg, sodium chloride 0.9%, sodium chloride 0.9%      Review of Systems  Objective:     Weight: 86.2 kg (190 lb)  Body mass index is 25.77 kg/m².  Vital Signs (Most Recent):  Temp: 98.1 °F (36.7 °C) (09/23/22 0700)  Pulse: 77 (09/23/22 1013)  Resp: 14 (09/23/22 1013)  BP: 125/66 (09/23/22 0700)  SpO2: 97 % (09/23/22 1013) Vital Signs (24h Range):  Temp:  [97.7 °F (36.5 °C)-98.2 °F (36.8 °C)] 98.1 °F (36.7 °C)  Pulse:  [70-84] 77  Resp:  [14-20] 14  SpO2:  [94 %-97 %] 97 %  BP: (110-131)/(54-73) 125/66                          Physical Exam    Neurosurgery Physical Exam  General: AOx3, GCS [id number], higher order confusion  CNII-XII: Intact on fine exam, PERRL, visual fields grossly intact, EOMI, facial sensation preserved, no facial asymmetry, tongue midline, shoulder shrug equal, no pronator drift  Extremities: LUE contracted at baseline (4/5 motor), 5/5 motor otherwise, sensorium intact throughout, coordination intact throughout, DTRs 2+, no pathological reflexes, no sensory level present    Significant Labs:  Recent Labs   Lab 09/22/22  0557 09/23/22  0610   * 130*    137   K 3.4* 3.7   CL 99 100   CO2 25 24   BUN 13 17   CREATININE 0.8 0.8   CALCIUM 9.5 9.9   MG 1.6 1.7       Recent Labs   Lab 09/22/22  0556 09/23/22  0610   WBC 10.42 11.26   HGB 14.7 15.0   HCT 44.7 45.9    169       Recent Labs   Lab 09/22/22  0557 09/23/22  0610   INR 2.6* 2.9*       Microbiology Results (last 7 days)       ** No results found for the last 168 hours. **          All pertinent labs from the last 24 hours have been reviewed.    Significant Diagnostics:  I have reviewed all pertinent imaging results/findings within the past 24 hours.    Assessment/Plan:     * Brain mass  Patient is a 77M with PMH of HTN, DM, polio, CAD (on ASA/Coumadin with pacemaker) who presents with multiple falls (including this morning). Patient was transferred from OSH for NSGY for possible brain mass.    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   --CTH with contrast 9/20: interventricular diffuse enhancing mass in lateral vents and 3rd vent   --CT chest/abdo/pelvis 9/21: 1.1 cm solid solitary RUL nodule, additional micronodule, hepatosplenomegaly, nonsepcific lucent lesions in L ilum, L3 and L4   --Cannot do MRI - pacemaker MRI compatible, but leads not   --tentative plan for repeat CTH w wo con on Monday  --SBP <160  --Na >135  --Dex 4q6  --PUD PPx while steroid treatment ongoing  --Continue to monitor clinically, notify NSGY immediately with any changes in neuro status        Bonita Fontaine MD  Neurosurgery  [facility] - Neurosurgery ([facility])

## 2022-09-23 NOTE — ASSESSMENT & PLAN NOTE
Chronic anticoagulation use w/ warfarin  S/P pacemaker with multiple replacements    Home meds: Warfarin 7.5 qd, atenolol 50 qd  [id number]      -- Discontinued home warfarin in the case of NSGY intervention, plan to introduce heparin after potential intervention  -- Continue home ASA, statin, and atenolol  -- Daily INR  -- Cardiac telemetry  -- Maintain K > 4, Mg > 2, Ca wnl; replete PRN  -- STAT cardiology consult if hemodynamic instability for DCCV evaluation

## 2022-09-23 NOTE — ASSESSMENT & PLAN NOTE
77-year-old male with extensive past medical history presenting from [facility] for Neurosurgery evaluation of a brain mass found on CT head.  Per patient wife, patient has had sudden memory problems in the last 2 weeks with recurrent falls, but without loss of consciousness, seizure activity, head trauma.  Baseline is normal without other limitations.  Patient also has been describing 1 year of headache.  Has seen neurology outpatient, and has been taking gabapentin and undergoing nerve block procedures in neck with little effect. 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     Concerns for metastatic disease, possibly lung as primary given extensive smoking history.    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  - CTH with contrast 9/20: interventricular diffuse enhancing mass in lateral vents and 3rd vent  - CT chest/abdo/pelvis 9/21: 1.1 cm solid solitary RUL nodule, additional micronodule, hepatosplenomegaly, nonsepcific lucent lesions in L ilum, L3 and L4    Patient's pacemaker leads are NOT compatible with MRI    Plan:   -- NSGY evaluated, appreciate recs  --SBP <160  --Na >135  --Dex 4mg q6  --PUD PPx while steroid treatment ongoing, patient on home pantoprazole 40mg qd  --Continue to monitor clinically, notify NSGY immediately with any changes in neuro status  -- no immediate surgical intervention planned  -- Neuro check q4h  -- Fall, Aspiration, Delirium precautions

## 2022-09-24 LAB
ALBUMIN SERPL BCP-MCNC: 3.8 G/DL (ref 3.5–5.2)
ALP SERPL-CCNC: 57 U/L (ref 55–135)
ALT SERPL W/O P-5'-P-CCNC: 8 U/L (ref 10–44)
ANION GAP SERPL CALC-SCNC: 14 MMOL/L (ref 8–16)
AST SERPL-CCNC: 13 U/L (ref 10–40)
BASOPHILS # BLD AUTO: 0.01 K/UL (ref 0–0.2)
BASOPHILS NFR BLD: 0.1 % (ref 0–1.9)
BILIRUB SERPL-MCNC: 1 MG/DL (ref 0.1–1)
BUN SERPL-MCNC: 32 MG/DL (ref 8–23)
CALCIUM SERPL-MCNC: 9.4 MG/DL (ref 8.7–10.5)
CHLORIDE SERPL-SCNC: 97 MMOL/L (ref 95–110)
CO2 SERPL-SCNC: 23 MMOL/L (ref 23–29)
CREAT SERPL-MCNC: 1 MG/DL (ref 0.5–1.4)
DIFFERENTIAL METHOD: ABNORMAL
EOSINOPHIL # BLD AUTO: 0 K/UL (ref 0–0.5)
EOSINOPHIL NFR BLD: 0 % (ref 0–8)
ERYTHROCYTE [DISTWIDTH] IN BLOOD BY AUTOMATED COUNT: 16.5 % (ref 11.5–14.5)
EST. GFR  (NO RACE VARIABLE): >60 ML/MIN/1.73 M^2
GLUCOSE SERPL-MCNC: 174 MG/DL (ref 70–110)
HCT VFR BLD AUTO: 46.6 % (ref 40–54)
HGB BLD-MCNC: 15.2 G/DL (ref 14–18)
IMM GRANULOCYTES # BLD AUTO: 0.09 K/UL (ref 0–0.04)
IMM GRANULOCYTES NFR BLD AUTO: 0.7 % (ref 0–0.5)
INR PPP: 1.7 (ref 0.8–1.2)
LYMPHOCYTES # BLD AUTO: 0.8 K/UL (ref 1–4.8)
LYMPHOCYTES NFR BLD: 5.9 % (ref 18–48)
MAGNESIUM SERPL-MCNC: 1.8 MG/DL (ref 1.6–2.6)
MCH RBC QN AUTO: 30.5 PG (ref 27–31)
MCHC RBC AUTO-ENTMCNC: 32.6 G/DL (ref 32–36)
MCV RBC AUTO: 94 FL (ref 82–98)
MONOCYTES # BLD AUTO: 0.4 K/UL (ref 0.3–1)
MONOCYTES NFR BLD: 2.8 % (ref 4–15)
NEUTROPHILS # BLD AUTO: 11.4 K/UL (ref 1.8–7.7)
NEUTROPHILS NFR BLD: 90.5 % (ref 38–73)
NRBC BLD-RTO: 0 /100 WBC
PHOSPHATE SERPL-MCNC: 3.2 MG/DL (ref 2.7–4.5)
PLATELET # BLD AUTO: 191 K/UL (ref 150–450)
PMV BLD AUTO: 12.4 FL (ref 9.2–12.9)
POCT GLUCOSE: 186 MG/DL (ref 70–110)
POCT GLUCOSE: 262 MG/DL (ref 70–110)
POCT GLUCOSE: 263 MG/DL (ref 70–110)
POCT GLUCOSE: 263 MG/DL (ref 70–110)
POTASSIUM SERPL-SCNC: 4.4 MMOL/L (ref 3.5–5.1)
PROT SERPL-MCNC: 7.3 G/DL (ref 6–8.4)
PROTHROMBIN TIME: 16.7 SEC (ref 9–12.5)
RBC # BLD AUTO: 4.98 M/UL (ref 4.6–6.2)
SODIUM SERPL-SCNC: 134 MMOL/L (ref 136–145)
WBC # BLD AUTO: 12.62 K/UL (ref 3.9–12.7)

## 2022-09-24 PROCEDURE — 27000221 HC OXYGEN, UP TO 24 HOURS

## 2022-09-24 PROCEDURE — 36415 COLL VENOUS BLD VENIPUNCTURE: CPT

## 2022-09-24 PROCEDURE — 99233 PR SUBSEQUENT HOSPITAL CARE,LEVL III: ICD-10-PCS | Mod: GC,,, | Performed by: HOSPITALIST

## 2022-09-24 PROCEDURE — 99233 SBSQ HOSP IP/OBS HIGH 50: CPT | Mod: GC,,, | Performed by: HOSPITALIST

## 2022-09-24 PROCEDURE — 63600175 PHARM REV CODE 636 W HCPCS

## 2022-09-24 PROCEDURE — 99223 1ST HOSP IP/OBS HIGH 75: CPT | Mod: GC,,, | Performed by: STUDENT IN AN ORGANIZED HEALTH CARE EDUCATION/TRAINING PROGRAM

## 2022-09-24 PROCEDURE — 97535 SELF CARE MNGMENT TRAINING: CPT

## 2022-09-24 PROCEDURE — 97162 PT EVAL MOD COMPLEX 30 MIN: CPT

## 2022-09-24 PROCEDURE — 86403 PARTICLE AGGLUT ANTBDY SCRN: CPT

## 2022-09-24 PROCEDURE — 11000001 HC ACUTE MED/SURG PRIVATE ROOM

## 2022-09-24 PROCEDURE — 97112 NEUROMUSCULAR REEDUCATION: CPT

## 2022-09-24 PROCEDURE — S4991 NICOTINE PATCH NONLEGEND: HCPCS

## 2022-09-24 PROCEDURE — 97165 OT EVAL LOW COMPLEX 30 MIN: CPT

## 2022-09-24 PROCEDURE — 85025 COMPLETE CBC W/AUTO DIFF WBC: CPT

## 2022-09-24 PROCEDURE — 83735 ASSAY OF MAGNESIUM: CPT

## 2022-09-24 PROCEDURE — 25000003 PHARM REV CODE 250

## 2022-09-24 PROCEDURE — 94640 AIRWAY INHALATION TREATMENT: CPT

## 2022-09-24 PROCEDURE — 25000003 PHARM REV CODE 250: Performed by: EMERGENCY MEDICINE

## 2022-09-24 PROCEDURE — 94761 N-INVAS EAR/PLS OXIMETRY MLT: CPT

## 2022-09-24 PROCEDURE — 80053 COMPREHEN METABOLIC PANEL: CPT

## 2022-09-24 PROCEDURE — 85610 PROTHROMBIN TIME: CPT

## 2022-09-24 PROCEDURE — 99233 PR SUBSEQUENT HOSPITAL CARE,LEVL III: ICD-10-PCS | Mod: ,,, | Performed by: NEUROLOGICAL SURGERY

## 2022-09-24 PROCEDURE — 81443 GENETIC TSTG SEVERE INH COND: CPT

## 2022-09-24 PROCEDURE — 99233 SBSQ HOSP IP/OBS HIGH 50: CPT | Mod: ,,, | Performed by: NEUROLOGICAL SURGERY

## 2022-09-24 PROCEDURE — 86777 TOXOPLASMA ANTIBODY: CPT

## 2022-09-24 PROCEDURE — 99223 PR INITIAL HOSPITAL CARE,LEVL III: ICD-10-PCS | Mod: GC,,, | Performed by: STUDENT IN AN ORGANIZED HEALTH CARE EDUCATION/TRAINING PROGRAM

## 2022-09-24 PROCEDURE — 84100 ASSAY OF PHOSPHORUS: CPT

## 2022-09-24 PROCEDURE — 99900035 HC TECH TIME PER 15 MIN (STAT)

## 2022-09-24 PROCEDURE — 97530 THERAPEUTIC ACTIVITIES: CPT

## 2022-09-24 PROCEDURE — 86778 TOXOPLASMA ANTIBODY IGM: CPT

## 2022-09-24 RX ORDER — ENOXAPARIN SODIUM 100 MG/ML
40 INJECTION SUBCUTANEOUS EVERY 24 HOURS
Status: DISCONTINUED | OUTPATIENT
Start: 2022-09-24 | End: 2022-09-25

## 2022-09-24 RX ADMIN — HYDROCODONE BITARTRATE AND ACETAMINOPHEN 1 TABLET: 10; 325 TABLET ORAL at 08:09

## 2022-09-24 RX ADMIN — ATENOLOL 50 MG: 25 TABLET ORAL at 08:09

## 2022-09-24 RX ADMIN — INSULIN ASPART 3 UNITS: 100 INJECTION, SOLUTION INTRAVENOUS; SUBCUTANEOUS at 08:09

## 2022-09-24 RX ADMIN — PANTOPRAZOLE SODIUM 40 MG: 40 TABLET, DELAYED RELEASE ORAL at 08:09

## 2022-09-24 RX ADMIN — DEXAMETHASONE SODIUM PHOSPHATE 4 MG: 4 INJECTION INTRA-ARTICULAR; INTRALESIONAL; INTRAMUSCULAR; INTRAVENOUS; SOFT TISSUE at 05:09

## 2022-09-24 RX ADMIN — ROPINIROLE HYDROCHLORIDE 1 MG: 1 TABLET, FILM COATED ORAL at 08:09

## 2022-09-24 RX ADMIN — DEXAMETHASONE SODIUM PHOSPHATE 4 MG: 4 INJECTION INTRA-ARTICULAR; INTRALESIONAL; INTRAMUSCULAR; INTRAVENOUS; SOFT TISSUE at 11:09

## 2022-09-24 RX ADMIN — INSULIN DETEMIR 5 UNITS: 100 INJECTION, SOLUTION SUBCUTANEOUS at 08:09

## 2022-09-24 RX ADMIN — OLANZAPINE 5 MG: 2.5 TABLET, FILM COATED ORAL at 08:09

## 2022-09-24 RX ADMIN — ATORVASTATIN CALCIUM 40 MG: 40 TABLET, FILM COATED ORAL at 08:09

## 2022-09-24 RX ADMIN — ACETAMINOPHEN 650 MG: 325 TABLET ORAL at 05:09

## 2022-09-24 RX ADMIN — ENOXAPARIN SODIUM 40 MG: 100 INJECTION SUBCUTANEOUS at 04:09

## 2022-09-24 RX ADMIN — HEPARIN SODIUM 5000 UNITS: 5000 INJECTION INTRAVENOUS; SUBCUTANEOUS at 05:09

## 2022-09-24 RX ADMIN — HYDROXYZINE HYDROCHLORIDE 25 MG: 25 TABLET, FILM COATED ORAL at 04:09

## 2022-09-24 RX ADMIN — ASPIRIN 81 MG: 81 TABLET, COATED ORAL at 08:09

## 2022-09-24 RX ADMIN — OLANZAPINE 10 MG: 2.5 TABLET, FILM COATED ORAL at 08:09

## 2022-09-24 RX ADMIN — INSULIN ASPART 6 UNITS: 100 INJECTION, SOLUTION INTRAVENOUS; SUBCUTANEOUS at 05:09

## 2022-09-24 RX ADMIN — MELATONIN TAB 3 MG 6 MG: 3 TAB at 08:09

## 2022-09-24 RX ADMIN — INSULIN ASPART 6 UNITS: 100 INJECTION, SOLUTION INTRAVENOUS; SUBCUTANEOUS at 11:09

## 2022-09-24 RX ADMIN — Medication 1 PATCH: at 08:09

## 2022-09-24 RX ADMIN — FLUTICASONE FUROATE AND VILANTEROL TRIFENATATE 1 PUFF: 100; 25 POWDER RESPIRATORY (INHALATION) at 08:09

## 2022-09-24 NOTE — PLAN OF CARE
Problem: Adult Inpatient Plan of Care  Goal: Plan of Care Review  Outcome: Ongoing, Progressing  Goal: Patient-Specific Goal (Individualized)  Outcome: Ongoing, Progressing  Goal: Absence of Hospital-Acquired Illness or Injury  Outcome: Ongoing, Progressing  Goal: Optimal Comfort and Wellbeing  Outcome: Ongoing, Progressing  Goal: Readiness for Transition of Care  Outcome: Ongoing, Progressing     Problem: Fall Injury Risk  Goal: Absence of Fall and Fall-Related Injury  Outcome: Ongoing, Progressing     Problem: Restraint, Nonbehavioral (Nonviolent)  Goal: Absence of Harm or Injury  Outcome: Ongoing, Progressing     Problem: Diabetes Comorbidity  Goal: Blood Glucose Level Within Targeted Range  Outcome: Ongoing, Progressing     Problem: Skin Injury Risk Increased  Goal: Skin Health and Integrity  Outcome: Ongoing, Progressing     Problem: Impaired Wound Healing  Goal: Optimal Wound Healing  Outcome: Ongoing, Progressing    POC reviewed with pt and spouse. Pt AAO to self. All safety precautions maintained, bed lock in lowest position, bed alarm set, call light within reach. Tele sitter monitor in room. All questions and concerns addressed at this time. Check flowsheet for Vs. Will continue to monitor.

## 2022-09-24 NOTE — PT/OT/SLP EVAL
Occupational Therapy   Co-Evaluation and Treatment    Patient Name: Kieran Styles   MRN: [id number]  Admit Date: 9/20/2022  Admitting Diagnosis: Brain mass   Recent Surgery: * No surgery found *      Co-treatment performed for this visit due to patient need for two skilled therapists to ensure patient and staff safety and to accommodate for patient activity tolerance/pain management   Recommendations:     Discharge Recommendations: rehabilitation facility  Discharge Equipment Recommendations: other (see comments) (TBD)  Barriers to discharge: None    Assessment:     Kieran Styles is a 77 y.o. male with a medical diagnosis of Brain mass. He presents with performance deficits affecting function including weakness, gait instability, decreased upper extremity function, decreased ROM, impaired endurance, impaired balance, impaired self care skills, impaired cognition, impaired functional mobility, decreased coordination. Pt req'd Min-Max assist for self-care tasks, has difficulty w/ STM at this time, has hx of recurrent falls in the past month, and has impaired static/dynamic sitting balance w/ limited activity tolerance to engage in ADLs/IADLs on this date w/ OT. Pt would benefit from skilled OT services acutely so that pt may safely D/C to least restrictive environment w/o risk of falls or readmisson.       Rehab Prognosis: Good; patient would benefit from acute skilled OT services to address these deficits and reach maximum level of function.     Plan:     Patient to be seen 3 x/week to address the above listed problems via self-care/home management, therapeutic activities, therapeutic exercises, cognitive retraining  Plan of Care Expires: 10/22/22  Plan of Care Reviewed with: spouse, patient    Subjective     Communicated with RN prior to session. Patient found supine upon OT entry to room, agreeable to evaluation.     Chief Complaint: Fall (Transfer from [facility], frequent falls. CT revealed brain  "mass )    Patient/Family Comments/Goals: "I need help, I cannot be the only person taking care of him if he falls again," per wife, Donna.     Occupational Profile:  Living Environment: Patient lives with their spouse in a single story home, number of outside stairs: 1, walk-in shower.  Prior Level of Function: Prior to admission, patient req'd assistance from wife for LB dressing and getting on/off toilet only.   Roles and Routines: retired and does not go out in the community often.   DME owned (not currently used): rolling walker and single point cane  Upon discharge, patient will have assistance from  wife .    Pain/Comfort:  Pain Rating 1: 0/10    Objective:   Patient found with: bed alarm, telemetry     General Precautions: Standard, fall, other (see comments) (impaired STM)   Orthopedic Precautions:N/A   Braces: N/A   Respiratory Status:   Room air  Vitals: /63 (BP Location: Left arm, Patient Position: Lying)   Pulse 74   Temp 97.5 °F (36.4 °C) (Oral)   Resp 17   Ht 6' (1.829 m)   Wt 86.2 kg (190 lb)   SpO2 (!) 93%   BMI 25.77 kg/m²     Cognitive and Psychosocial Function:   AxOx -- Person only   Follows Commands/attention: easily distracted by wife and open-ended questions.  Communication: clear/fluent  Memory: Impaired STM  Safety awareness/insight to disability: impaired   Mood/Affect/Coping skills/emotional control: Appropriate to situation    Hearing: Intact    Vision: Intact visual fields    Physical Exam:  Postural examination/scapula alignment:    -       Rounded shoulders  -       Forward head  -       Posterior pelvic tilt  Skin integrity: Visible skin intact     Left UE Right UE   UE Edema absent absent   UE ROM limited by PLOF contracture 2T polio at young age AROM WFL   UE Strength 3+/5 5/5    Strength impaired grasp WFL   Sensation LUE INTACT:WFL RUE INTACT: WFL   Fine Motor Coordination:  LUE IMPAIRED: hand, finger to nose, hand thumb/finger opposition skills , and " manipulation of objects  RUE intact: WFL    Gross Motor Coordination: LUE IMPAIRED:  2T PLOF contracture RUE INTACT:WFL       Bed Mobility:   Rolling/Turning to Left with maximal assistance  Rolling/Turning to Right with maximal assistance  Scooting to HOB in supine: maximal assistance    Functional Mobility/Transfers:  Sit <> Stand Transfer with moderate assistance x 2 w/ 3 attempts  Static Sitting EOB: Max A x 1 > progressed w/ time to CGA  Dynamic Sitting EOB: Mod A x 1  Static Standing Balance: Mod A x 1  Dynamic Standing Balance: Max A x 1    Activities of Daily Living:  Feeding: set-up assist  Grooming: Min A x 1 for thoroughness while seated EOB  Upper Body Dressing: Min A x 1   Lower Body Dressing: Max A x 1  Toileting: Max A x 1 (in depends upon arrival for eval)    AMPAC 6 Click ADL:  AMPAC Total Score: 14    Treatment & Education:  Therapist provided facilitation and instruction of proper body mechanics, energy conservation, and fall prevention strategies during tasks listed above.  Pt educated on role of OT, POC and goals for therapy  Pt educated on importance of OOB activities with staff member assistance and sitting OOB majority of the day.       Patient left supine with all lines intact, call button in reach, RN notified, and bed alarm on.    GOALS:   Multidisciplinary Problems       Occupational Therapy Goals          Problem: Occupational Therapy    Goal Priority Disciplines Outcome Interventions   Occupational Therapy Goal     OT, PT/OT Ongoing, Progressing    Description: Goals to be met by: 10/8/22     Patient will increase functional independence with ADLs by performing:    UE Dressing with Set-up Assistance.  Grooming while seated with Supervision.  Toileting from bedside commode with Set-up Assistance for hygiene and clothing management.                          History:     Past Medical History:   Diagnosis Date    Coronary artery disease     Diabetes mellitus     GERD (gastroesophageal  reflux disease)     Hypertension          Past Surgical History:   Procedure Laterality Date    APPENDECTOMY      CARDIAC SURGERY      JOINT REPLACEMENT         Time Tracking:     OT Date of Treatment: 09/24/22  OT Start Time: 1017  OT Stop Time: 1058  OT Total Time (min): 41 min  Additional staff present: PT     Billable Minutes: Evaluation 15 Self Care/Home Management 10  Neuromuscular Re-education 16    9/24/2022

## 2022-09-24 NOTE — SUBJECTIVE & OBJECTIVE
Past Medical History:   Diagnosis Date    Coronary artery disease     Diabetes mellitus     GERD (gastroesophageal reflux disease)     Hypertension        Past Surgical History:   Procedure Laterality Date    APPENDECTOMY      CARDIAC SURGERY      JOINT REPLACEMENT         Review of patient's allergies indicates:   Allergen Reactions    Penicillins     Sulfa (sulfonamide antibiotics)        Medications:  Medications Prior to Admission   Medication Sig    acetaminophen (TYLENOL) 325 MG tablet Take 650 mg by mouth every 6 (six) hours as needed for Pain.    aspirin (ECOTRIN) 81 MG EC tablet Take 1 tablet by mouth once daily.    atenoloL (TENORMIN) 50 MG tablet Take 50 mg by mouth once daily.    atorvastatin (LIPITOR) 10 MG tablet Take 10 mg by mouth every evening.    docusate sodium (COLACE) 250 MG capsule Take 250 mg by mouth 2 (two) times a day.    furosemide (LASIX) 20 MG tablet Take 20 mg by mouth once daily.    gabapentin (NEURONTIN) 300 MG capsule Take 300-900 mg by mouth daily    HYDROcodone-acetaminophen (NORCO)  mg per tablet Take 1 tablet by mouth every 8 (eight) hours as needed for Pain.    liraglutide 0.6 mg/0.1 mL, 18 mg/3 mL, subq PNIJ (VICTOZA 2-SEDRICK) 0.6 mg/0.1 mL (18 mg/3 mL) PnIj pen Inject 0.6 mg into the skin once daily.    losartan-hydrochlorothiazide 100-25 mg (HYZAAR) 100-25 mg per tablet Take 1 tablet by mouth once daily.    metFORMIN (GLUCOPHAGE) 500 MG tablet Take 500 mg by mouth 3 (three) times daily.    pantoprazole (PROTONIX) 40 MG tablet Take 40 mg by mouth once daily.    pioglitazone (ACTOS) 15 MG tablet Take 15 mg by mouth once daily.    rOPINIRole (REQUIP) 1 MG tablet Take 1 mg by mouth every evening.    warfarin (COUMADIN) 5 MG tablet Take 5 mg by mouth three days weekly and 7.5 mg on all other days     Antibiotics (From admission, onward)      None          Antifungals (From admission, onward)      None          Antivirals (From admission, onward)      None               There is  no immunization history on file for this patient.    Family History    None       Social History     Socioeconomic History    Marital status:    Tobacco Use    Smoking status: Every Day     Packs/day: 1.00     Years: 30.00     Pack years: 30.00     Types: Cigarettes    Smokeless tobacco: Never   Substance and Sexual Activity    Alcohol use: Never    Drug use: Never     Review of Systems   Constitutional:  Positive for fatigue. Negative for chills and fever.   HENT:  Negative for congestion and sore throat.    Respiratory:  Negative for cough and shortness of breath.    Cardiovascular:  Negative for chest pain and palpitations.   Gastrointestinal:  Negative for abdominal pain, nausea and vomiting.   Genitourinary:  Negative for dysuria and frequency.   Musculoskeletal:  Negative for arthralgias and back pain.   Skin:  Negative for rash and wound.   Neurological:  Positive for weakness (chronic) and headaches. Negative for dizziness.   Psychiatric/Behavioral:  Positive for confusion. Negative for agitation.    Objective:     Vital Signs (Most Recent):  Temp: 97.6 °F (36.4 °C) (09/24/22 1117)  Pulse: 72 (09/24/22 1117)  Resp: 18 (09/24/22 1117)  BP: 116/63 (09/24/22 1117)  SpO2: (!) 93 % (09/24/22 1117)   Vital Signs (24h Range):  Temp:  [97.5 °F (36.4 °C)-98.4 °F (36.9 °C)] 97.6 °F (36.4 °C)  Pulse:  [66-79] 72  Resp:  [16-18] 18  SpO2:  [91 %-96 %] 93 %  BP: (115-122)/(55-63) 116/63     Weight: 86.2 kg (190 lb)  Body mass index is 25.77 kg/m².    Estimated Creatinine Clearance: 69 mL/min (based on SCr of 1 mg/dL).    Physical Exam  Vitals and nursing note reviewed.   Constitutional:       General: He is not in acute distress.     Appearance: He is obese. He is not ill-appearing, toxic-appearing or diaphoretic.   HENT:      Head: Normocephalic and atraumatic.      Right Ear: External ear normal.      Left Ear: External ear normal.      Nose: Nose normal.      Mouth/Throat:      Mouth: Mucous membranes are dry.    Eyes:      General:         Right eye: No discharge.         Left eye: No discharge.      Conjunctiva/sclera: Conjunctivae normal.   Cardiovascular:      Rate and Rhythm: Normal rate and regular rhythm.      Heart sounds: Normal heart sounds. No murmur heard.  Pulmonary:      Effort: Pulmonary effort is normal. No respiratory distress.      Breath sounds: Normal breath sounds. No wheezing or rales.   Abdominal:      General: Abdomen is flat. There is no distension.      Palpations: There is no mass.      Tenderness: There is no abdominal tenderness.   Musculoskeletal:         General: No swelling, tenderness or deformity. Normal range of motion.      Cervical back: Normal range of motion and neck supple.      Right lower leg: No edema.      Left lower leg: No edema.   Skin:     General: Skin is warm.      Coloration: Skin is not jaundiced.      Findings: No bruising.   Neurological:      Mental Status: He is alert and oriented to person, place, and time.      Cranial Nerves: No cranial nerve deficit.      Motor: Weakness (left sided, LUE weak 2/2 h/o Polio) present.      Comments: Neuro status appears to be improving each day.        Significant Labs: Blood Culture:   Recent Labs   Lab 09/23/22  1546   LABBLOO No Growth to date  No Growth to date     CBC:   Recent Labs   Lab 09/23/22  0610 09/24/22  0611   WBC 11.26 12.62   HGB 15.0 15.2   HCT 45.9 46.6    191     CMP:   Recent Labs   Lab 09/23/22  0610 09/24/22  0611    134*   K 3.7 4.4    97   CO2 24 23   * 174*   BUN 17 32*   CREATININE 0.8 1.0   CALCIUM 9.9 9.4   PROT 7.7 7.3   ALBUMIN 3.8 3.8   BILITOT 1.6* 1.0   ALKPHOS 55 57   AST 14 13   ALT 9* 8*   ANIONGAP 13 14       Significant Imaging: I have reviewed all pertinent imaging results/findings within the past 24 hours.    CT Head With Contrast   Final Result   Abnormal      Abnormal study with intraventricular soft tissue enhancement involving the bilateral lateral ventricles  and 3rd ventricle.  See above comments.  Recommend neurosurgical consultation and follow-up.      This report was flagged in Epic as abnormal.         Electronically signed by: Chinedu Maldonado   Date:    09/20/2022   Time:    23:38      CT Chest Abdomen Pelvis With Contrast (xpd)   Final Result   Abnormal      1. 1.1 cm solid pulmonary nodule in the right upper lobe.  Additional micronodule right middle lobe.  For multiple solid nodules with any 6 mm or greater, Fleischner Society guidelines recommend follow up with non-contrast chest CT at 3-6 months and 18-24 months after discovery.  If further evaluation desired at this time PET scan or tissue sampling may be helpful..   2. Nonspecific lucent lesions in the left ilium and L3 and L4 vertebral body.  Unfortunately metastases not excluded..   3. 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   4. Hepatosplenomegaly.   This report was flagged in Epic as abnormal.      Electronically signed by resident: Radha Rivas   Date:    09/21/2022   Time:    07:02      Electronically signed by: Adalberto Diego MD   Date:    09/21/2022   Time:    08:25      CT Head Without Contrast   Final Result   Abnormal   Addendum (preliminary) 1 of 1      On additional review there is subtle increased density associated with the    lateral and third ventricles and possibly the periventricular white matter    on the left.  This is obscured by beam hardening artifact on the CT and    better visualized on the subsequent postcontrast study.      Subtle hypoattenuation in the periventricular white matter adjacent to the    left trigone could represent associated mild vasogenic edema.  See CT head    with contrast which better visualizes/characterizes the findings.      Differential diagnosis includes intraventricular meningioma or metastatic    disease. Choroid plexus papilloma/carcinoma, ependymoma/subependymoma or    central neurocytoma are not excluded.  Glioma or lymphoma  associated with    the adjacent white matter with intraventricular extension is a    consideration also.      Recommend neurosurgical consultation and follow-up.      Additional impression:      Abnormal study.  See above comments.      This report was flagged in Epic as abnormal.         Electronically signed by: Chinedu Maldonado   Date:    09/20/2022   Time:    23:45      Final      1. No acute intracranial process.   2. Involutional changes with chronic microvascular ischemic changes.         Electronically signed by: Chinedu Maldonado   Date:    09/20/2022   Time:    18:08      X-Ray Chest 1 View   Final Result      No acute cardiopulmonary process.      Electronically signed by resident: Osman Mcbride   Date:    09/20/2022   Time:    17:15      Electronically signed by: Ezio Avila MD   Date:    09/20/2022   Time:    17:21

## 2022-09-24 NOTE — ASSESSMENT & PLAN NOTE
77-year-old male with extensive past medical history presenting from [facility] for Neurosurgery evaluation of a brain mass found on CT head.  Per patient wife, patient has had sudden memory problems in the last 2 weeks with recurrent falls, but without loss of consciousness, seizure activity, head trauma.  Baseline is normal without other limitations.  Patient also has been describing 1 year of headache.  Has seen neurology outpatient, and has been taking gabapentin and undergoing nerve block procedures in neck with little effect. 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     Concerns for metastatic disease, possibly lung as primary given extensive smoking history.    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  - CTH with contrast 9/20: interventricular diffuse enhancing mass in lateral vents and 3rd vent  - CT chest/abdo/pelvis 9/21: 1.1 cm solid solitary RUL nodule, additional micronodule, hepatosplenomegaly, nonsepcific lucent lesions in L ilum, L3 and L4    Patient's pacemaker leads are NOT compatible with MRI    Plan:   -- NSGY evaluated, appreciate recs  -- CT head for 9/26, will f/u  --SBP <160  --Na >135  --Dex 4mg q6  --PUD PPx while steroid treatment ongoing, patient on home pantoprazole 40mg qd  --Continue to monitor clinically, notify NSGY immediately with any changes in neuro status  -- will consult neurology for LP to assess for lymphoma  -- no immediate surgical intervention planned  -- Neuro check q4h  -- Fall, Aspiration, Delirium precautions

## 2022-09-24 NOTE — ASSESSMENT & PLAN NOTE
Patient's FSGs are controlled on current medication regimen.  Last A1c reviewed-   Lab Results   Component Value Date    HGBA1C 6.7 (H) 09/20/2022     Most recent fingerstick glucose reviewed-   Recent Labs   Lab 09/23/22  1549 09/23/22 2031 09/24/22  0755 09/24/22  1119   POCTGLUCOSE 179* 188* 186* 263*     Current correctional scale  Medium  Maintain anti-hyperglycemic dose as follows-   Antihyperglycemics (From admission, onward)    Start     Stop Route Frequency Ordered    09/20/22 2300  insulin detemir U-100 pen 5 Units         -- SubQ Nightly 09/20/22 2152 09/20/22 2250  insulin aspart U-100 pen 1-10 Units         -- SubQ Before meals & nightly PRN 09/20/22 2152      Home medications: metformin, pioglitazone, Victoza    -- Hold oral hyperglycemics  -- SSI, POCT BG qACHS; titrate basal/bolus regimen PRN to 140-180 goal

## 2022-09-24 NOTE — PROGRESS NOTES
"[facility] Bryan - Neurosurgery ([facility])  [facility] Medicine  Progress Note    Patient Name: Kieran Styles  MRN: [id number]  Patient Class: IP- Inpatient   Admission Date: 9/20/2022  Length of Stay: 4 days  Attending Physician: Edy Fried MD  Primary Care Provider: Sony Holt DO        Subjective:     Principal Problem:Brain mass        HPI:  76 yo PMHx DM2, CAD s/p CABG x 2 1997, history of adenomatous colon polyps (07/2017) revealing 10 mm sessile serrated adenoma, GERD, multiple pacemaker replacements most recent 2021 for AF (on warfarin), HTN, HLD, chronic back pain, MARQUIS on CPAP [place] [facility] [place] [facility] due to a 2 week history of sudden memory deficits, disorientation, confsuion (date, situation, short-term memory), and multiple falls w/o head trauma or LOC or seizure. Wife noticed that he was forgetting where he was, what he had been doing. In addition, he has been getting weaker and more unstable on his feet. Yesterday evening, he fell while going to the bathroom and hit his elbow. Transported to EMS to [facility], where they did a CT head 09/20 this AM. Was discharged from [facility] because they initially told the wife that the CT scan was "clear", wife brought him to PCP and cardiologist OP office later that morning, but was called back by [facility] stating that they had mis-read the CT head and that the CT head was remarkable for apparent mass in brain.  Patient was subsequently transferred via EMS to [facility].  [facility] staff informed the wife that they were concerned for possible cancer, and wanted him to see neurosurgery evaluation.  Patient wife denies history cancer to her knowledge.  Patient is an active smoker smoking 1-2 packs per day, and has smoked for the last 30 years.  Social alcohol use, no other illicit substances.    On my initial evaluation, patient is conversational; however, he is only oriented to " self and time.  He exhibits short-term memory loss, and is not sure why he is here.  Overall, patient is a poor historian.  Family was not at bedside.  This history of present illness was obtained via telephone conversation with his wife ([phone] Donna Styles).  ROS includes denies chest pain, abdominal pain, nausea, vomiting, shortness of breath, cough, diarrhea, constipation, blood per rectum, fevers, chills, muscle aches, new weakness, new sensory changes.  Patient reports history of chronic lower back pain.    ED course:  Neurosurgery evaluated at [facility], concerns for metastasis.  No plans for surgical intervention at this time.  Subsequently pan CT scan pending.    PMHx/PSHx:  DM2, CAD s/p CABG x 2 , multiple pacemaker replacements most recent  for AF (on warfarin), HTN, HLD, chronic back pain, MARQUIS on CPAP    FMHx: Mother with breast cancer, . Sister NAFLD w/ cirrhosis, .      Overview/Hospital Course:  Patient presenting with sudden memory deficits, AMS, and multiple falls who was transferred from [facility] to [facility] for NSGY evaluation of the CT head () finding of a brain mass concerning for cancer. No plans for surgical intervention at this time. CT A/P with con demonstrated a pulmonary nodule, bony lesions in left ilium, L3 and L4 vertebral bodies concerning for metastasis. Patient's pacemaker is compatible with MRI; however, the leads are not therefore patient cannot undergo MRI. NSGY recommends planning for CT head on  to assess for interval change. In the meantime, will attempt an LP to assess for lymphoma. ID has also been consulted regarding potential infectious etiologies, will f/u their recs.       Interval History: NAEON. Patient reports improvement in back pain with multimodal analgesics.     Review of Systems   Constitutional:  Negative for chills and fever.   HENT:  Negative for rhinorrhea, sneezing, sore throat and trouble swallowing.     Respiratory:  Negative for cough and shortness of breath.    Cardiovascular:  Negative for chest pain and palpitations.   Gastrointestinal:  Negative for constipation, diarrhea, nausea and vomiting.   Genitourinary:  Negative for dysuria, frequency and urgency.   Musculoskeletal:  Positive for arthralgias, back pain and neck pain. Negative for myalgias.   Skin:  Negative for rash and wound.   Neurological:  Negative for syncope, facial asymmetry, speech difficulty, weakness, light-headedness and headaches.   Psychiatric/Behavioral:  Positive for agitation, confusion and decreased concentration.    Objective:     Vital Signs (Most Recent):  Temp: 97.6 °F (36.4 °C) (09/24/22 0752)  Pulse: 73 (09/24/22 0752)  Resp: 17 (09/24/22 0752)  BP: (!) 122/57 (09/24/22 0752)  SpO2: (!) 93 % (09/24/22 0752) Vital Signs (24h Range):  Temp:  [96.5 °F (35.8 °C)-98.4 °F (36.9 °C)] 97.6 °F (36.4 °C)  Pulse:  [66-79] 73  Resp:  [14-18] 17  SpO2:  [91 %-97 %] 93 %  BP: ()/(53-63) 122/57     Weight: 86.2 kg (190 lb)  Body mass index is 25.77 kg/m².    Intake/Output Summary (Last 24 hours) at 9/24/2022 0812  Last data filed at 9/23/2022 1843  Gross per 24 hour   Intake 360 ml   Output --   Net 360 ml        Physical Exam  Vitals and nursing note reviewed.   Constitutional:       General: He is not in acute distress.     Appearance: He is obese. He is not ill-appearing or diaphoretic.   HENT:      Right Ear: External ear normal.      Left Ear: External ear normal.      Nose: Nose normal.   Eyes:      General:         Right eye: No discharge.         Left eye: No discharge.      Conjunctiva/sclera: Conjunctivae normal.   Cardiovascular:      Rate and Rhythm: Normal rate and regular rhythm.      Heart sounds: Normal heart sounds. No murmur heard.  Pulmonary:      Effort: Pulmonary effort is normal. No respiratory distress.      Breath sounds: Normal breath sounds. No wheezing or rales.   Abdominal:      General: Abdomen is flat.  There is no distension.      Palpations: There is no mass.      Tenderness: There is no abdominal tenderness.   Musculoskeletal:         General: No swelling, tenderness or deformity. Normal range of motion.      Cervical back: Normal range of motion and neck supple.      Right lower leg: No edema.      Left lower leg: No edema.   Skin:     General: Skin is warm.      Coloration: Skin is not jaundiced.      Findings: No bruising.   Neurological:      Mental Status: He is alert.      Cranial Nerves: No cranial nerve deficit.      Motor: Weakness (left sided, LUE weak 2/2 h/o Polio) present.      Comments: Oriented to person and year but not to place  Poor memory recall       Significant Labs: All pertinent labs within the past 24 hours have been reviewed.    Significant Imaging: I have reviewed all pertinent imaging results/findings within the past 24 hours.      Assessment/Plan:      * Brain mass  77-year-old male with extensive past medical history presenting from [facility] for Neurosurgery evaluation of a brain mass found on CT head.  Per patient wife, patient has had sudden memory problems in the last 2 weeks with recurrent falls, but without loss of consciousness, seizure activity, head trauma.  Baseline is normal without other limitations.  Patient also has been describing 1 year of headache.  Has seen neurology outpatient, and has been taking gabapentin and undergoing nerve block procedures in neck with little effect. 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     Concerns for metastatic disease, possibly lung as primary given extensive smoking history.    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  - CTH with contrast 9/20: interventricular diffuse enhancing mass in lateral vents and 3rd vent  - CT chest/abdo/pelvis 9/21: 1.1 cm solid solitary RUL nodule, additional micronodule,  hepatosplenomegaly, nonsepcific lucent lesions in L ilum, L3 and L4    Patient's pacemaker leads are NOT compatible with MRI    Plan:   -- NSGY evaluated, appreciate recs  -- CT head for 9/26, will f/u  --SBP <160  --Na >135  --Dex 4mg q6  --PUD PPx while steroid treatment ongoing, patient on home pantoprazole 40mg qd  --Continue to monitor clinically, notify NSGY immediately with any changes in neuro status  -- ID consulted to r/o infectious etiology of mass   - biopsy of pulm nodule, IR consulted for bx   - f/u toxoplasma IgG and IgM, immunodeficiency panel, serum cryptococcus antibody  -- no immediate surgical intervention planned  -- Neuro check q4h  -- Fall, Aspiration, Delirium precautions    Dermatitis associated with moisture  Wound care consulted, appreciate recs      COPD (chronic obstructive pulmonary disease)  On Anoro. Rx of singular but not taking.    -- Continue formulary equivalent of inhaler  -- PRN duoneb  -- SpO2 goal 88-92       Restless leg  -- Continue home ropinirole       GERD (gastroesophageal reflux disease)  -- Continue home PPI      Chronic back pain  -- MM pain regimen PRN   Tylenol PRN   Home Narco PRN   Lidocaine patches      Hypertension  Home medications: atenolol 50 qd, losartan-HCTZ 100-25 mg qd    -- Continue home medications as tolerated      MARQUIS on CPAP  -- Maintain nightly CPAP  -- Patient wife to bring home CPAP      Pacemaker  Pacemaker is compatible with MRI but the leads are NOT compatible with MRI      Atrial fibrillation  Chronic anticoagulation use w/ warfarin  S/P pacemaker with multiple replacements    Home meds: Warfarin 7.5 qd, atenolol 50 qd  [id number]      -- Discontinued home warfarin in the case of NSGY intervention  -- Lovenox bridge while off warfarin   -- h/o stroke while off AC for a past colonoscopy  -- Continue home ASA, statin, and atenolol  -- Daily INR   -- Cardiac telemetry  -- Maintain K > 4, Mg > 2, Ca wnl; replete PRN  -- STAT cardiology consult  if hemodynamic instability for DCCV evaluation        Hyperlipidemia  - See CAD involving coronary bypass graft      Coronary artery disease involving coronary bypass graft  CABG x 2 1997  Dyslipidemia    -- Continue ASA 81  -- Continue HI statin    Type 2 diabetes mellitus, without long-term current use of insulin  Patient's FSGs are controlled on current medication regimen.  Last A1c reviewed-   Lab Results   Component Value Date    HGBA1C 6.7 (H) 09/20/2022     Most recent fingerstick glucose reviewed-   Recent Labs   Lab 09/23/22  1549 09/23/22  2031 09/24/22  0755 09/24/22  1119   POCTGLUCOSE 179* 188* 186* 263*     Current correctional scale  Medium  Maintain anti-hyperglycemic dose as follows-   Antihyperglycemics (From admission, onward)      Start     Stop Route Frequency Ordered    09/20/22 2300  insulin detemir U-100 pen 5 Units         -- SubQ Nightly 09/20/22 2152 09/20/22 2250  insulin aspart U-100 pen 1-10 Units         -- SubQ Before meals & nightly PRN 09/20/22 2152        Home medications: metformin, pioglitazone, Victoza    -- Hold oral hyperglycemics  -- SSI, POCT BG qACHS; titrate basal/bolus regimen PRN to 140-180 goal      VTE Risk Mitigation (From admission, onward)           Ordered     enoxaparin injection 40 mg  Daily         09/24/22 1023     Reason for No Pharmacological VTE Prophylaxis  Once        Question:  Reasons:  Answer:  Already adequately anticoagulated on oral Anticoagulants    09/20/22 2152     IP VTE HIGH RISK PATIENT  Once         09/20/22 2152     Place sequential compression device  Until discontinued         09/20/22 2039                    Discharge Planning   ANITA: 9/27/2022     Code Status: Full Code   Is the patient medically ready for discharge?: No    Reason for patient still in hospital (select all that apply): Patient trending condition, Laboratory test, Treatment, Imaging and Consult recommendations  Discharge Plan A: Other (TBD)                  Sarah  MD Viet  Department of Hospital Medicine   Charlie Randi - Neurosurgery ([facility])

## 2022-09-24 NOTE — PLAN OF CARE
Problem: Adult Inpatient Plan of Care  Goal: Plan of Care Review  Outcome: Ongoing, Progressing  Flowsheets (Taken 9/23/2022 2250)  Plan of Care Reviewed With:   patient   spouse     Problem: Fall Injury Risk  Goal: Absence of Fall and Fall-Related Injury  Outcome: Ongoing, Progressing  Intervention: Identify and Manage Contributors  Flowsheets (Taken 9/23/2022 2250)  Medication Review/Management:   medications reviewed   high-risk medications identified  Intervention: Promote Injury-Free Environment  Flowsheets (Taken 9/23/2022 2250)  Safety Promotion/Fall Prevention:   assistive device/personal item within reach   bed alarm set   Fall Risk reviewed with patient/family   lighting adjusted   medications reviewed   side rails raised x 3   instructed to call staff for mobility     Problem: Diabetes Comorbidity  Goal: Blood Glucose Level Within Targeted Range  Outcome: Ongoing, Progressing     Problem: Skin Injury Risk Increased  Goal: Skin Health and Integrity  Outcome: Ongoing, Progressing  Intervention: Optimize Skin Protection  Flowsheets (Taken 9/23/2022 2250)  Pressure Reduction Techniques: frequent weight shift encouraged  Skin Protection:   adhesive use limited   incontinence pads utilized   tubing/devices free from skin contact     Problem: Impaired Wound Healing  Goal: Optimal Wound Healing  Outcome: Ongoing, Progressing  Intervention: Promote Wound Healing  Flowsheets (Taken 9/23/2022 2250)  Activity Management:   Arm raise - L1   Leg kicks - L2   Rolling - L1  Pain Management Interventions:   care clustered   medication offered   pain management plan reviewed with patient/caregiver

## 2022-09-24 NOTE — ASSESSMENT & PLAN NOTE
Chronic anticoagulation use w/ warfarin  S/P pacemaker with multiple replacements    Home meds: Warfarin 7.5 qd, atenolol 50 qd  [id number]      -- Discontinued home warfarin in the case of NSGY intervention  -- Lovenox bridge while off warfarin   -- h/o stroke while off AC for a past colonoscopy  -- Continue home ASA, statin, and atenolol  -- Daily INR   -- Cardiac telemetry  -- Maintain K > 4, Mg > 2, Ca wnl; replete PRN  -- STAT cardiology consult if hemodynamic instability for DCCV evaluation

## 2022-09-24 NOTE — PLAN OF CARE
Problem: Occupational Therapy  Goal: Occupational Therapy Goal  Description: Goals to be met by: 10/8/22     Patient will increase functional independence with ADLs by performing:    UE Dressing with Set-up Assistance.  Grooming while seated with Supervision.  Toileting from bedside commode with Set-up Assistance for hygiene and clothing management.     Outcome: Ongoing, Progressing

## 2022-09-24 NOTE — HPI
Mr. Kieran Styles is a 78 y/o M with hx of DM2, CAD s/p CABG x2 1997, AF on warfarin, HTN, HLD, MARQUIS, GERD, pacemaker placement who presented to [facility] [place] 9/20 as a transfer for NSGY eval. Per family, over the past 2 weeks patient has had a rapidly progressive decline in memory, confusion, and has had multiple falls. They presented to [facility] ED where a head CT demonstrated an apparent ependymal/subependymal mass along the left lateral ventricle and extending at least to midline at the 3rd ventricle, with concerns for neural neoplasm or CNS lymphoma. Patient was subsequently transferred to [facility] [place] for NSGY eval. CTH with contrast obtained 9/20 at [facility] demonstrating an intraventricular soft tissue enhancement involving the bilateral lateral ventricles and 3rd ventricle. (Patient unable to undergo MRI d/t pacemaker lead incompatibility). NSGY following patient, no indication for urgent surgery at this time, are currently continuing to work up etiology of patient's intracranial findings.     Of note, patient reports severe headaches for the past year. He has been following with neurology for his headaches, and is taking Gabapentin and has received nerve blocks with no relief. He was also admitted to [facility] 6/17-6/22 with a supraglottic infection, along with associated Pasteurella multocida bacteremia. He was initially placed on BSA, and then transitioned to Levofloxacin and doxycycline for a 2 week total course of abx.

## 2022-09-24 NOTE — SUBJECTIVE & OBJECTIVE
Interval History: NAEON. Patient reports improvement in back pain with multimodal analgesics.     Review of Systems   Constitutional:  Negative for chills and fever.   HENT:  Negative for rhinorrhea, sneezing, sore throat and trouble swallowing.    Respiratory:  Negative for cough and shortness of breath.    Cardiovascular:  Negative for chest pain and palpitations.   Gastrointestinal:  Negative for constipation, diarrhea, nausea and vomiting.   Genitourinary:  Negative for dysuria, frequency and urgency.   Musculoskeletal:  Positive for arthralgias, back pain and neck pain. Negative for myalgias.   Skin:  Negative for rash and wound.   Neurological:  Negative for syncope, facial asymmetry, speech difficulty, weakness, light-headedness and headaches.   Psychiatric/Behavioral:  Positive for agitation, confusion and decreased concentration.    Objective:     Vital Signs (Most Recent):  Temp: 97.6 °F (36.4 °C) (09/24/22 0752)  Pulse: 73 (09/24/22 0752)  Resp: 17 (09/24/22 0752)  BP: (!) 122/57 (09/24/22 0752)  SpO2: (!) 93 % (09/24/22 0752) Vital Signs (24h Range):  Temp:  [96.5 °F (35.8 °C)-98.4 °F (36.9 °C)] 97.6 °F (36.4 °C)  Pulse:  [66-79] 73  Resp:  [14-18] 17  SpO2:  [91 %-97 %] 93 %  BP: ()/(53-63) 122/57     Weight: 86.2 kg (190 lb)  Body mass index is 25.77 kg/m².    Intake/Output Summary (Last 24 hours) at 9/24/2022 0812  Last data filed at 9/23/2022 1843  Gross per 24 hour   Intake 360 ml   Output --   Net 360 ml        Physical Exam  Vitals and nursing note reviewed.   Constitutional:       General: He is not in acute distress.     Appearance: He is obese. He is not ill-appearing or diaphoretic.   HENT:      Right Ear: External ear normal.      Left Ear: External ear normal.      Nose: Nose normal.   Eyes:      General:         Right eye: No discharge.         Left eye: No discharge.      Conjunctiva/sclera: Conjunctivae normal.   Cardiovascular:      Rate and Rhythm: Normal rate and regular rhythm.       Heart sounds: Normal heart sounds. No murmur heard.  Pulmonary:      Effort: Pulmonary effort is normal. No respiratory distress.      Breath sounds: Normal breath sounds. No wheezing or rales.   Abdominal:      General: Abdomen is flat. There is no distension.      Palpations: There is no mass.      Tenderness: There is no abdominal tenderness.   Musculoskeletal:         General: No swelling, tenderness or deformity. Normal range of motion.      Cervical back: Normal range of motion and neck supple.      Right lower leg: No edema.      Left lower leg: No edema.   Skin:     General: Skin is warm.      Coloration: Skin is not jaundiced.      Findings: No bruising.   Neurological:      Mental Status: He is alert.      Cranial Nerves: No cranial nerve deficit.      Motor: Weakness (left sided, LUE weak 2/2 h/o Polio) present.      Comments: Oriented to person and year but not to place  Poor memory recall       Significant Labs: All pertinent labs within the past 24 hours have been reviewed.    Significant Imaging: I have reviewed all pertinent imaging results/findings within the past 24 hours.

## 2022-09-24 NOTE — CONSULTS
[facility] - Neurosurgery ([facility])  Infectious Disease  Consult Note    Patient Name: Kieran Styles  MRN: [id number]  Admission Date: 9/20/2022  Hospital Length of Stay: 4 days  Attending Physician: Edy Fried MD  Primary Care Provider: Sony Holt DO     Isolation Status: No active isolations    Patient information was obtained from patient, spouse/SO, past medical records and ER records.      Inpatient consult to Infectious Diseases  Consult performed by: Obdulio Valdes MD  Consult ordered by: Sarah Llanos MD        Assessment/Plan:     * Brain mass  76 y/o M admitted with rapidly worsening confusion, memory loss, falls. Evidence of intracranial mass involving lateral and 3rd ventricles on CTH. Unclear etiology; Unable to obtain MRI due to Pacemaker lead incompatibility. NSGY following. ID consulted given concerns for infectious etiology. Pt also with 1.1cm nodule in RUL of lung, as well as lucent lesions in L ilium and lumbar vertebral bodies. Favor malignancy at this time given hx of smoking, lung nodule, and bony lesions in hip and spine. Would recommend biopsy of pulmonary nodule if possible. Infection less likely. 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     Recommendations:  -- Pulmonology vs IR consult for lung nodule bx  -- if able to biopsy nodule, recommend sending path, cytology, aerobic/anaerobic/fungal/AFB cultures  -- recommend collecting toxoplasma IgG and IgM, immunodeficiency panel, serum cryptococcus antibody  -- ID will sign off; please contact us if biopsy cultures positive         Thank you for your consult. I will sign off. Please contact us if you have any additional questions.    Obdulio Valdes MD  Infectious Disease  [facility] - Neurosurgery ([facility])    Subjective:     Principal Problem: Brain mass    HPI: Mr. Kieran Styles is a 76 y/o M with hx of DM2, CAD s/p CABG x2 1997, AF on warfarin, HTN, HLD, MARQUIS, GERD, pacemaker placement who presented to [facility] [place] [facility]  9/20 as a transfer for NSGY eval. Per family, over the past 2 weeks patient has had a rapidly progressive decline in memory, confusion, and has had multiple falls. 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 Patient was [place] [facility] [place]. C[place] with contrast obtained 9[place]20 [place] [facility] demonstrating an intraventricular soft tissue enhancement involving the bilateral lateral ventricles and 3rd ventricle. (Patient unable to undergo MRI d/t pacemaker lead incompatibility). NSGY following patient, no indication for urgent surgery at this time, are currently continuing to work up etiology of patient's intracranial findings.     Of note, patient reports severe headaches for the past year. He has been following with neurology for his headaches, and is taking Gabapentin and has received nerve blocks with no relief. He was also admitted to [facility] 6/17-6/22 with a supraglottic infection, along with associated Pasteurella multocida bacteremia. He was initially placed on BSA, and then transitioned to Levofloxacin and doxycycline for a 2 week total course of abx.           Past Medical History:   Diagnosis Date    Coronary artery disease     Diabetes mellitus     GERD (gastroesophageal reflux disease)     Hypertension        Past Surgical History:   Procedure Laterality Date    APPENDECTOMY      CARDIAC SURGERY      JOINT REPLACEMENT         Review of patient's allergies indicates:   Allergen Reactions    Penicillins     Sulfa (sulfonamide antibiotics)        Medications:  Medications Prior to Admission   Medication Sig    acetaminophen (TYLENOL) 325 MG tablet Take 650 mg by mouth every 6 (six) hours as needed for Pain.    aspirin (ECOTRIN) 81 MG EC tablet Take 1 tablet by mouth once  daily.    atenoloL (TENORMIN) 50 MG tablet Take 50 mg by mouth once daily.    atorvastatin (LIPITOR) 10 MG tablet Take 10 mg by mouth every evening.    docusate sodium (COLACE) 250 MG capsule Take 250 mg by mouth 2 (two) times a day.    furosemide (LASIX) 20 MG tablet Take 20 mg by mouth once daily.    gabapentin (NEURONTIN) 300 MG capsule Take 300-900 mg by mouth daily    HYDROcodone-acetaminophen (NORCO)  mg per tablet Take 1 tablet by mouth every 8 (eight) hours as needed for Pain.    liraglutide 0.6 mg/0.1 mL, 18 mg/3 mL, subq PNIJ (VICTOZA 2-SEDRICK) 0.6 mg/0.1 mL (18 mg/3 mL) PnIj pen Inject 0.6 mg into the skin once daily.    losartan-hydrochlorothiazide 100-25 mg (HYZAAR) 100-25 mg per tablet Take 1 tablet by mouth once daily.    metFORMIN (GLUCOPHAGE) 500 MG tablet Take 500 mg by mouth 3 (three) times daily.    pantoprazole (PROTONIX) 40 MG tablet Take 40 mg by mouth once daily.    pioglitazone (ACTOS) 15 MG tablet Take 15 mg by mouth once daily.    rOPINIRole (REQUIP) 1 MG tablet Take 1 mg by mouth every evening.    warfarin (COUMADIN) 5 MG tablet Take 5 mg by mouth three days weekly and 7.5 mg on all other days     Antibiotics (From admission, onward)      None          Antifungals (From admission, onward)      None          Antivirals (From admission, onward)      None               There is no immunization history on file for this patient.    Family History    None       Social History     Socioeconomic History    Marital status:    Tobacco Use    Smoking status: Every Day     Packs/day: 1.00     Years: 30.00     Pack years: 30.00     Types: Cigarettes    Smokeless tobacco: Never   Substance and Sexual Activity    Alcohol use: Never    Drug use: Never     Review of Systems   Constitutional:  Positive for fatigue. Negative for chills and fever.   HENT:  Negative for congestion and sore throat.    Respiratory:  Negative for cough and shortness of breath.    Cardiovascular:   Negative for chest pain and palpitations.   Gastrointestinal:  Negative for abdominal pain, nausea and vomiting.   Genitourinary:  Negative for dysuria and frequency.   Musculoskeletal:  Negative for arthralgias and back pain.   Skin:  Negative for rash and wound.   Neurological:  Positive for weakness (chronic) and headaches. Negative for dizziness.   Psychiatric/Behavioral:  Positive for confusion. Negative for agitation.    Objective:     Vital Signs (Most Recent):  Temp: 97.6 °F (36.4 °C) (09/24/22 1117)  Pulse: 72 (09/24/22 1117)  Resp: 18 (09/24/22 1117)  BP: 116/63 (09/24/22 1117)  SpO2: (!) 93 % (09/24/22 1117)   Vital Signs (24h Range):  Temp:  [97.5 °F (36.4 °C)-98.4 °F (36.9 °C)] 97.6 °F (36.4 °C)  Pulse:  [66-79] 72  Resp:  [16-18] 18  SpO2:  [91 %-96 %] 93 %  BP: (115-122)/(55-63) 116/63     Weight: 86.2 kg (190 lb)  Body mass index is 25.77 kg/m².    Estimated Creatinine Clearance: 69 mL/min (based on SCr of 1 mg/dL).    Physical Exam  Vitals and nursing note reviewed.   Constitutional:       General: He is not in acute distress.     Appearance: He is obese. He is not ill-appearing, toxic-appearing or diaphoretic.   HENT:      Head: Normocephalic and atraumatic.      Right Ear: External ear normal.      Left Ear: External ear normal.      Nose: Nose normal.      Mouth/Throat:      Mouth: Mucous membranes are dry.   Eyes:      General:         Right eye: No discharge.         Left eye: No discharge.      Conjunctiva/sclera: Conjunctivae normal.   Cardiovascular:      Rate and Rhythm: Normal rate and regular rhythm.      Heart sounds: Normal heart sounds. No murmur heard.  Pulmonary:      Effort: Pulmonary effort is normal. No respiratory distress.      Breath sounds: Normal breath sounds. No wheezing or rales.   Abdominal:      General: Abdomen is flat. There is no distension.      Palpations: There is no mass.      Tenderness: There is no abdominal tenderness.   Musculoskeletal:         General: No  swelling, tenderness or deformity. Normal range of motion.      Cervical back: Normal range of motion and neck supple.      Right lower leg: No edema.      Left lower leg: No edema.   Skin:     General: Skin is warm.      Coloration: Skin is not jaundiced.      Findings: No bruising.   Neurological:      Mental Status: He is alert and oriented to person, place, and time.      Cranial Nerves: No cranial nerve deficit.      Motor: Weakness (left sided, LUE weak 2/2 h/o Polio) present.      Comments: Neuro status appears to be improving each day.        Significant Labs: Blood Culture:   Recent Labs   Lab 09/23/22  1546   LABBLOO No Growth to date  No Growth to date     CBC:   Recent Labs   Lab 09/23/22  0610 09/24/22  0611   WBC 11.26 12.62   HGB 15.0 15.2   HCT 45.9 46.6    191     CMP:   Recent Labs   Lab 09/23/22  0610 09/24/22  0611    134*   K 3.7 4.4    97   CO2 24 23   * 174*   BUN 17 32*   CREATININE 0.8 1.0   CALCIUM 9.9 9.4   PROT 7.7 7.3   ALBUMIN 3.8 3.8   BILITOT 1.6* 1.0   ALKPHOS 55 57   AST 14 13   ALT 9* 8*   ANIONGAP 13 14       Significant Imaging: I have reviewed all pertinent imaging results/findings within the past 24 hours.    CT Head With Contrast   Final Result   Abnormal      Abnormal study with intraventricular soft tissue enhancement involving the bilateral lateral ventricles and 3rd ventricle.  See above comments.  Recommend neurosurgical consultation and follow-up.      This report was flagged in Epic as abnormal.         Electronically signed by: Chinedu Maldonado   Date:    09/20/2022   Time:    23:38      CT Chest Abdomen Pelvis With Contrast (xpd)   Final Result   Abnormal      1. 1.1 cm solid pulmonary nodule in the right upper lobe.  Additional micronodule right middle lobe.  For multiple solid nodules with any 6 mm or greater, Fleischner Society guidelines recommend follow up with non-contrast chest CT at 3-6 months and 18-24 months after discovery.  If  further evaluation desired at this time PET scan or tissue sampling may be helpful..   2. Nonspecific lucent lesions in the left ilium and L3 and L4 vertebral body.  Unfortunately metastases not excluded..   3. 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   4. Hepatosplenomegaly.   This report was flagged in Epic as abnormal.      Electronically signed by resident: Radha Rivas   Date:    09/21/2022   Time:    07:02      Electronically signed by: Adalberto Diego MD   Date:    09/21/2022   Time:    08:25      CT Head Without Contrast   Final Result   Abnormal   Addendum (preliminary) 1 of 1      On additional review there is subtle increased density associated with the    lateral and third ventricles and possibly the periventricular white matter    on the left.  This is obscured by beam hardening artifact on the CT and    better visualized on the subsequent postcontrast study.      Subtle hypoattenuation in the periventricular white matter adjacent to the    left trigone could represent associated mild vasogenic edema.  See CT head    with contrast which better visualizes/characterizes the findings.      Differential diagnosis includes intraventricular meningioma or metastatic    disease. Choroid plexus papilloma/carcinoma, ependymoma/subependymoma or    central neurocytoma are not excluded.  Glioma or lymphoma associated with    the adjacent white matter with intraventricular extension is a    consideration also.      Recommend neurosurgical consultation and follow-up.      Additional impression:      Abnormal study.  See above comments.      This report was flagged in Epic as abnormal.         Electronically signed by: Chinedu Maldonado   Date:    09/20/2022   Time:    23:45      Final      1. No acute intracranial process.   2. Involutional changes with chronic microvascular ischemic changes.         Electronically signed by: Chinedu Maldonado   Date:    09/20/2022   Time:    18:08      X-Ray Chest 1  View   Final Result      No acute cardiopulmonary process.      Electronically signed by resident: Osman Mcbride   Date:    09/20/2022   Time:    17:15      Electronically signed by: Ezio Avila MD   Date:    09/20/2022   Time:    17:21

## 2022-09-24 NOTE — ASSESSMENT & PLAN NOTE
77-year-old male with extensive past medical history presenting from [facility] for Neurosurgery evaluation of a brain mass found on CT head.  Per patient wife, patient has had sudden memory problems in the last 2 weeks with recurrent falls, but without loss of consciousness, seizure activity, head trauma.  Baseline is normal without other limitations.  Patient also has been describing 1 year of headache.  Has seen neurology outpatient, and has been taking gabapentin and undergoing nerve block procedures in neck with little effect. 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     Concerns for metastatic disease, possibly lung as primary given extensive smoking history.    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  - CTH with contrast 9/20: interventricular diffuse enhancing mass in lateral vents and 3rd vent  - CT chest/abdo/pelvis 9/21: 1.1 cm solid solitary RUL nodule, additional micronodule, hepatosplenomegaly, nonsepcific lucent lesions in L ilum, L3 and L4    Patient's pacemaker leads are NOT compatible with MRI    Plan:   -- NSGY evaluated, appreciate recs  -- CT head for 9/26, will f/u  --SBP <160  --Na >135  --Dex 4mg q6  --PUD PPx while steroid treatment ongoing, patient on home pantoprazole 40mg qd  --Continue to monitor clinically, notify NSGY immediately with any changes in neuro status  -- ID consulted to r/o infectious etiology of mass   - f/u toxoplasma IgG and IgM, immunodeficiency panel, serum cryptococcus antibody  -- IR consulted, plan for LP under fleuro on 9/26   - f/u CSF labs: cell count with diff, protein, glucose, gram stain, culture, cytology, and flow cytometry   - pulmonary nodule bx can be done outpatient with studies (pathology, cytology, aerobic/anaerobic/fungal/AFB cultures)  -- zyprexa 10mg nightly for agitation, 5mg BID PRN for breakthrough  -- no immediate  surgical intervention planned  -- Neuro check q4h  -- Fall, Aspiration, Delirium precautions

## 2022-09-24 NOTE — PROGRESS NOTES
[facility] Bryan - Neurosurgery ([facility])  Neurosurgery  Progress Note    Subjective:     History of Present Illness: Patient is a 77M with PMH of HTN, DM, polio, CAD (on ASA/Coumadin) who presents with multiple falls (including this morning). Patient was transferred from OSH for NSGY for possible brain mass.    Patient has a pacemaker so it is uncertain whether or not he can get an MRI. [facility]. Patient denies head trauma with his fall this morning. He notes that he has started using a walker in the past few weeks. Patient complains of L leg pain, likely related to his fall. 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      Post-Op Info:  * No surgery found *         Interval History: 9/24: Patient slept well last night. Alert and oriented to self, location and year today. Wife at bedside. Cannot get MRI per  team because although pacemaker is MRI compatible, his old leads are not. Plan for repeat CTH on Monday.    Medications:  Continuous Infusions:  Scheduled Meds:   aspirin  81 mg Oral Daily    atenoloL  50 mg Oral Daily    atorvastatin  40 mg Oral QHS    dexamethasone  4 mg Intravenous Q6H    fluticasone furoate-vilanteroL  1 puff Inhalation Daily    furosemide  20 mg Oral Daily    heparin (porcine)  5,000 Units Subcutaneous Q8H    insulin detemir U-100  5 Units Subcutaneous QHS    LIDOcaine  1 patch Transdermal Q24H    losartan-hydrochlorothiazide 100-25 mg  1 tablet Oral Daily    nicotine  1 patch Transdermal Daily    OLANZapine  10 mg Oral Daily    pantoprazole  40 mg Oral Daily    rOPINIRole  1 mg Oral QHS     PRN Meds:acetaminophen, albuterol-ipratropium, aluminum-magnesium hydroxide-simethicone, dextrose 10%, dextrose 10%, glucagon (human recombinant), glucose, glucose, HYDROcodone-acetaminophen, hydrOXYzine HCL, insulin aspart U-100, melatonin, naloxone, OLANZapine, polyethylene glycol, senna-docusate 8.6-50  mg, sodium chloride 0.9%, sodium chloride 0.9%     Review of Systems  Objective:     Weight: 86.2 kg (190 lb)  Body mass index is 25.77 kg/m².  Vital Signs (Most Recent):  Temp: 97.6 °F (36.4 °C) (09/24/22 0752)  Pulse: 71 (09/24/22 0812)  Resp: 18 (09/24/22 0812)  BP: (!) 122/57 (09/24/22 0752)  SpO2: (!) 91 % (09/24/22 0812) Vital Signs (24h Range):  Temp:  [96.5 °F (35.8 °C)-98.4 °F (36.9 °C)] 97.6 °F (36.4 °C)  Pulse:  [66-79] 71  Resp:  [14-18] 18  SpO2:  [91 %-97 %] 91 %  BP: ()/(53-63) 122/57                          Physical Exam    Neurosurgery Physical Exam  General: AOx3, GCS E4V4M6, higher order confusion  CNII-XII: Intact on fine exam, PERRL, visual fields grossly intact, EOMI, facial sensation preserved, no facial asymmetry, tongue midline, shoulder shrug equal, no pronator drift  Extremities: LUE contracted at baseline (4/5 motor), 5/5 motor otherwise, sensorium intact throughout, coordination intact throughout, DTRs 2+, no pathological reflexes, no sensory level present    Significant Labs:  Recent Labs   Lab 09/23/22  0610 09/24/22  0611   * 174*    134*   K 3.7 4.4    97   CO2 24 23   BUN 17 32*   CREATININE 0.8 1.0   CALCIUM 9.9 9.4   MG 1.7 1.8       Recent Labs   Lab 09/23/22  0610 09/24/22  0611   WBC 11.26 12.62   HGB 15.0 15.2   HCT 45.9 46.6    191       Recent Labs   Lab 09/23/22  0610 09/24/22  0611   INR 2.9* 1.7*       Microbiology Results (last 7 days)       Procedure Component Value Units Date/Time    Blood culture [[id number]] Collected: 09/23/22 1546    Order Status: Completed Specimen: Blood from Peripheral, Left Arm Updated: 09/24/22 0115     Blood Culture, Routine No Growth to date    Blood culture [[id number]] Collected: 09/23/22 1546    Order Status: Completed Specimen: Blood from Peripheral, Right Arm Updated: 09/24/22 0115     Blood Culture, Routine No Growth to date          All pertinent labs from the last 24 hours have been  reviewed.    Significant Diagnostics:  I have reviewed all pertinent imaging results/findings within the past 24 hours.    Assessment/Plan:     * Brain mass  Patient is a 77M with PMH of HTN, DM, polio, CAD (on ASA/Coumadin with pacemaker) who presents with multiple falls (including this morning). Patient was transferred from OSH for NSGY for possible brain mass.    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   --CTH with contrast 9/20: interventricular diffuse enhancing mass in lateral vents and 3rd vent   --CT chest/abdo/pelvis 9/21: 1.1 cm solid solitary RUL nodule, additional micronodule, hepatosplenomegaly, nonsepcific lucent lesions in L ilum, L3 and L4   --Cannot do MRI - pacemaker MRI compatible, but leads not   --tentative plan for repeat CTH w wo con on Monday  --SBP <160  --Na >135  --Dex 4q6  --PUD PPx while steroid treatment ongoing  --Continue to monitor clinically, notify NSGY immediately with any changes in neuro status        Ravindra Vance MD  Neurosurgery  [facility] - Neurosurgery ([facility]

## 2022-09-24 NOTE — PLAN OF CARE
PT evaluation complete and appropriate goals established.    2022    Problem: Physical Therapy  Goal: Physical Therapy Goal  Description: Goals to be met by: 10/8/2022     Patient will increase functional independence with mobility by performin. Supine to sit with MInimal Assistance  2. Sit to stand transfer with Minimal Assistance  3. Bed to chair transfer with Minimal Assistance using LRAD as needed  4. Gait  x 25 feet with Minimal Assistance using LRAD as needed.   5. Sitting at edge of bed x8 minutes with Stand-by Assistance while completing dynamic functional task  6. Lower extremity exercise program x15 reps, with supervision, in order to increase LE strength and (I) with functional mobility.   Outcome: Ongoing, Progressing

## 2022-09-24 NOTE — PT/OT/SLP EVAL
"Physical Therapy Co-Evaluation/Treatment    Patient Name:  Kieran Styles   MRN:  [id number]    Recommendations:     Discharge Recommendations:  rehabilitation facility   Discharge Equipment Recommendations:  (TBD at next level of care)   Barriers to discharge: Decreased caregiver support at current functional level    Assessment:     Kieran Styles is a 77 y.o. male admitted with a medical diagnosis of Brain mass.  He presents with the following impairments/functional limitations:  weakness, impaired endurance, impaired self care skills, impaired functional mobility, gait instability, impaired balance, decreased coordination, decreased upper extremity function, decreased lower extremity function, impaired cognition, decreased safety awareness, impaired fine motor, impaired coordination. Pt presents with confusion, inattention, and impaired balance in sitting and standing. Pt requiring increased assist to complete functional mobility this date. Initially requiring increased assist to maintain sitting balance at EOB, but gradually progressed. Completed static standing trials with R lateral lean throughout; unable to weight-shift appropriately or clear LE to successfully take steps at EOB. Pt would continue to benefit from skilled acute PT in order to address current deficits and progress functional mobility.     Rehab Prognosis: Good; patient would benefit from acute skilled PT services to address these deficits and reach maximum level of function.    Recent Surgery: * No surgery found *      Plan:     During this hospitalization, patient to be seen 4 x/week to address the identified rehab impairments via therapeutic activities, gait training, therapeutic exercises, neuromuscular re-education and progress toward the following goals:    Plan of Care Expires:  10/23/22    Subjective     Chief Complaint: none noted  Patient/Family Comments/goals: "I need to sit down."- pt stating when standing. "Let's go to the " "bathroom."- pt stating upon returning to sit  Pain/Comfort:  Pain Rating 1: 0/10    Patients cultural, spiritual, Shinto conflicts given the current situation: no    Living Environment:  History obtained per pt's wife, as pt is a poor historian at this time.  Pt lives with his wife in a 1SH with 1 JAEL.  Pt's wife reports that pt has been using SPC for mobility since d/c from hospital in July. Pt has required assist with ADLs 2* progressive functional decline.  Equipment used at home: cane, straight.  DME owned (not currently used):  rolling walker .  Upon discharge, patient will have assistance from his wife.    Objective:     Communicated with RN prior to session.  Patient found supine with telemetry, bed alarm  upon PT entry to room.    General Precautions: Standard, fall   Orthopedic Precautions:N/A   Braces: N/A  Respiratory Status: Room air    Exams:  Cognitive Exam:  Patient is oriented to Person  Inattention, easily distracted  Impaired motor planning   Impaired STM  Postural Exam:  Patient presented with the following abnormalities:    -       Rounded shoulders  -       Forward head  -       increased posterior lean while sitting EOB  Sensation:    -       Intact  LUE ROM: contracted at baseline 2* h/o polio in childhood  RLE ROM: WFL  RLE Strength: grossly 3+/5  LLE ROM: WFL  LLE Strength: grossly 3+/5    Functional Mobility:  Bed Mobility:     Supine scooting: total assistance and of 2 persons using drawsheet with bed in trendelenberg  Seated post scooting at EOB: max assist of 2 persons  Seated R lateral scooting at EOB: max assist, x2 reps  Cues provided for sequencing and technique   Supine to Sit: maximal assistance  Cues provided for sequencing and technique   Sit to Supine: maximal assistance  Cues provided for sequencing and technique   Transfers:     Sit to Stand:  moderate assistance and of 2 persons with hand-held assist, x3 reps from EOB  Cues for ant weight-shift and transfer technique "   Gait: max attempts to facilitate lateral steps along EOB; however, pt unable to weight-shift appropriately, clear LE, or sequence steps to successfully complete  Balance:   sitting EOB with initially maxA, progressing to CGA    Increased posterior lean, adiel with dynamic tasks. Max cues for upright posture corrections and midline positioning  static standing: maxA  Increased R lateral lean. Max attempts for appropriate weight-shifts, postural corrections, and midline positioning with increased difficulty correcting     Therapeutic Activities and Exercises:  Pt and pt's wife educated on role of PT and PT POC. Pt and pt's verbalized understanding.   Daily orientation provided.  Gown and socks donned. Bed linens changed  RN notified of pt functional status and level of assist needed for mobility     AM-PAC 6 CLICK MOBILITY  Total Score:9     Patient left supine with HOB elevated with all lines intact, call button in reach, bed alarm on, RN notified, and pt's wife present.    GOALS:   Multidisciplinary Problems       Physical Therapy Goals          Problem: Physical Therapy    Goal Priority Disciplines Outcome Goal Variances Interventions   Physical Therapy Goal     PT, PT/OT Ongoing, Progressing     Description: Goals to be met by: 10/8/2022     Patient will increase functional independence with mobility by performin. Supine to sit with MInimal Assistance  2. Sit to stand transfer with Minimal Assistance  3. Bed to chair transfer with Minimal Assistance using LRAD as needed  4. Gait  x 25 feet with Minimal Assistance using LRAD as needed.   5. Sitting at edge of bed x8 minutes with Stand-by Assistance while completing dynamic functional task  6. Lower extremity exercise program x15 reps, with supervision, in order to increase LE strength and (I) with functional mobility.                        History:     Past Medical History:   Diagnosis Date    Coronary artery disease     Diabetes mellitus     GERD  (gastroesophageal reflux disease)     Hypertension        Past Surgical History:   Procedure Laterality Date    APPENDECTOMY      CARDIAC SURGERY      JOINT REPLACEMENT         Time Tracking:     PT Received On: 09/24/22  PT Start Time: 1017     PT Stop Time: 1058  PT Total Time (min): 41 min     Billable Minutes: Evaluation 15, Therapeutic Activity 10, and Neuromuscular Re-education 16  (co-eval performed this date due to need for 2 skilled therapists in order to ensure pt safety, accomodate for pt activity tolerance, and maximize functional potential)     09/24/2022

## 2022-09-24 NOTE — ASSESSMENT & PLAN NOTE
76 y/o M admitted with rapidly worsening confusion, memory loss, falls. Evidence of intracranial mass involving lateral and 3rd ventricles on CTH. Unclear etiology; Unable to obtain MRI due to Pacemaker lead incompatibility. NSGY following. ID consulted given concerns for infectious etiology. Pt also with 1.1cm nodule in RUL of lung, as well as lucent lesions in L ilium and lumbar vertebral bodies. Favor malignancy at this time given hx of smoking, lung nodule, and bony lesions in hip and spine. Would recommend biopsy of pulmonary nodule if possible. Infection less likely. 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     Recommendations:  -- Pulmonology vs IR consult for lung nodule bx  -- if able to biopsy nodule, recommend sending path, cytology, aerobic/anaerobic/fungal/AFB cultures  -- recommend collecting toxoplasma IgG and IgM, immunodeficiency panel, serum cryptococcus antibody  -- ID will sign off; please contact us if biopsy cultures positive

## 2022-09-25 LAB
ALBUMIN SERPL BCP-MCNC: 3.5 G/DL (ref 3.5–5.2)
ALP SERPL-CCNC: 57 U/L (ref 55–135)
ALT SERPL W/O P-5'-P-CCNC: 8 U/L (ref 10–44)
ANION GAP SERPL CALC-SCNC: 12 MMOL/L (ref 8–16)
AST SERPL-CCNC: 9 U/L (ref 10–40)
BASOPHILS # BLD AUTO: 0.02 K/UL (ref 0–0.2)
BASOPHILS NFR BLD: 0.1 % (ref 0–1.9)
BILIRUB SERPL-MCNC: 0.5 MG/DL (ref 0.1–1)
BUN SERPL-MCNC: 40 MG/DL (ref 8–23)
CALCIUM SERPL-MCNC: 9.5 MG/DL (ref 8.7–10.5)
CHLORIDE SERPL-SCNC: 103 MMOL/L (ref 95–110)
CO2 SERPL-SCNC: 21 MMOL/L (ref 23–29)
CREAT SERPL-MCNC: 1.1 MG/DL (ref 0.5–1.4)
DIFFERENTIAL METHOD: ABNORMAL
EOSINOPHIL # BLD AUTO: 0 K/UL (ref 0–0.5)
EOSINOPHIL NFR BLD: 0 % (ref 0–8)
ERYTHROCYTE [DISTWIDTH] IN BLOOD BY AUTOMATED COUNT: 16.4 % (ref 11.5–14.5)
EST. GFR  (NO RACE VARIABLE): >60 ML/MIN/1.73 M^2
GLUCOSE SERPL-MCNC: 267 MG/DL (ref 70–110)
HCT VFR BLD AUTO: 45.3 % (ref 40–54)
HGB BLD-MCNC: 14.8 G/DL (ref 14–18)
IMM GRANULOCYTES # BLD AUTO: 0.12 K/UL (ref 0–0.04)
IMM GRANULOCYTES NFR BLD AUTO: 0.8 % (ref 0–0.5)
INR PPP: 1.3 (ref 0.8–1.2)
LYMPHOCYTES # BLD AUTO: 0.8 K/UL (ref 1–4.8)
LYMPHOCYTES NFR BLD: 5.2 % (ref 18–48)
MAGNESIUM SERPL-MCNC: 1.9 MG/DL (ref 1.6–2.6)
MCH RBC QN AUTO: 30.1 PG (ref 27–31)
MCHC RBC AUTO-ENTMCNC: 32.7 G/DL (ref 32–36)
MCV RBC AUTO: 92 FL (ref 82–98)
MONOCYTES # BLD AUTO: 0.7 K/UL (ref 0.3–1)
MONOCYTES NFR BLD: 4.4 % (ref 4–15)
NEUTROPHILS # BLD AUTO: 14.2 K/UL (ref 1.8–7.7)
NEUTROPHILS NFR BLD: 89.5 % (ref 38–73)
NRBC BLD-RTO: 0 /100 WBC
PHOSPHATE SERPL-MCNC: 2.4 MG/DL (ref 2.7–4.5)
PLATELET # BLD AUTO: 209 K/UL (ref 150–450)
PMV BLD AUTO: 12.1 FL (ref 9.2–12.9)
POCT GLUCOSE: 282 MG/DL (ref 70–110)
POCT GLUCOSE: 315 MG/DL (ref 70–110)
POCT GLUCOSE: 323 MG/DL (ref 70–110)
POTASSIUM SERPL-SCNC: 4 MMOL/L (ref 3.5–5.1)
PROT SERPL-MCNC: 7.4 G/DL (ref 6–8.4)
PROTHROMBIN TIME: 13.8 SEC (ref 9–12.5)
RBC # BLD AUTO: 4.92 M/UL (ref 4.6–6.2)
SODIUM SERPL-SCNC: 136 MMOL/L (ref 136–145)
WBC # BLD AUTO: 15.86 K/UL (ref 3.9–12.7)

## 2022-09-25 PROCEDURE — 84100 ASSAY OF PHOSPHORUS: CPT

## 2022-09-25 PROCEDURE — 85610 PROTHROMBIN TIME: CPT

## 2022-09-25 PROCEDURE — 25000003 PHARM REV CODE 250

## 2022-09-25 PROCEDURE — 99232 SBSQ HOSP IP/OBS MODERATE 35: CPT | Mod: GC,,, | Performed by: HOSPITALIST

## 2022-09-25 PROCEDURE — 92610 EVALUATE SWALLOWING FUNCTION: CPT

## 2022-09-25 PROCEDURE — 94761 N-INVAS EAR/PLS OXIMETRY MLT: CPT

## 2022-09-25 PROCEDURE — 99233 PR SUBSEQUENT HOSPITAL CARE,LEVL III: ICD-10-PCS | Mod: ,,, | Performed by: NEUROLOGICAL SURGERY

## 2022-09-25 PROCEDURE — S4991 NICOTINE PATCH NONLEGEND: HCPCS

## 2022-09-25 PROCEDURE — 99900035 HC TECH TIME PER 15 MIN (STAT)

## 2022-09-25 PROCEDURE — 94640 AIRWAY INHALATION TREATMENT: CPT

## 2022-09-25 PROCEDURE — 11000001 HC ACUTE MED/SURG PRIVATE ROOM

## 2022-09-25 PROCEDURE — 83735 ASSAY OF MAGNESIUM: CPT

## 2022-09-25 PROCEDURE — 36415 COLL VENOUS BLD VENIPUNCTURE: CPT

## 2022-09-25 PROCEDURE — 63600175 PHARM REV CODE 636 W HCPCS

## 2022-09-25 PROCEDURE — 25000003 PHARM REV CODE 250: Performed by: EMERGENCY MEDICINE

## 2022-09-25 PROCEDURE — 27000221 HC OXYGEN, UP TO 24 HOURS

## 2022-09-25 PROCEDURE — 99233 SBSQ HOSP IP/OBS HIGH 50: CPT | Mod: ,,, | Performed by: NEUROLOGICAL SURGERY

## 2022-09-25 PROCEDURE — 80053 COMPREHEN METABOLIC PANEL: CPT

## 2022-09-25 PROCEDURE — 99232 PR SUBSEQUENT HOSPITAL CARE,LEVL II: ICD-10-PCS | Mod: GC,,, | Performed by: HOSPITALIST

## 2022-09-25 PROCEDURE — 85025 COMPLETE CBC W/AUTO DIFF WBC: CPT

## 2022-09-25 RX ORDER — OLANZAPINE 2.5 MG/1
10 TABLET ORAL NIGHTLY
Status: DISCONTINUED | OUTPATIENT
Start: 2022-09-25 | End: 2022-09-28

## 2022-09-25 RX ORDER — SODIUM,POTASSIUM PHOSPHATES 280-250MG
2 POWDER IN PACKET (EA) ORAL EVERY 4 HOURS
Status: COMPLETED | OUTPATIENT
Start: 2022-09-25 | End: 2022-09-25

## 2022-09-25 RX ORDER — AMOXICILLIN 250 MG
1 CAPSULE ORAL DAILY
Status: DISCONTINUED | OUTPATIENT
Start: 2022-09-25 | End: 2022-09-27

## 2022-09-25 RX ORDER — POLYETHYLENE GLYCOL 3350 17 G/17G
17 POWDER, FOR SOLUTION ORAL DAILY
Status: DISCONTINUED | OUTPATIENT
Start: 2022-09-25 | End: 2022-09-27

## 2022-09-25 RX ADMIN — DEXAMETHASONE SODIUM PHOSPHATE 4 MG: 4 INJECTION INTRA-ARTICULAR; INTRALESIONAL; INTRAMUSCULAR; INTRAVENOUS; SOFT TISSUE at 06:09

## 2022-09-25 RX ADMIN — ROPINIROLE HYDROCHLORIDE 1 MG: 1 TABLET, FILM COATED ORAL at 08:09

## 2022-09-25 RX ADMIN — SENNOSIDES AND DOCUSATE SODIUM 1 TABLET: 50; 8.6 TABLET ORAL at 10:09

## 2022-09-25 RX ADMIN — LOSARTAN POTASSIUM AND HYDROCHLOROTHIAZIDE 1 TABLET: 100; 25 TABLET, FILM COATED ORAL at 09:09

## 2022-09-25 RX ADMIN — DEXAMETHASONE SODIUM PHOSPHATE 4 MG: 4 INJECTION INTRA-ARTICULAR; INTRALESIONAL; INTRAMUSCULAR; INTRAVENOUS; SOFT TISSUE at 11:09

## 2022-09-25 RX ADMIN — ATENOLOL 50 MG: 25 TABLET ORAL at 10:09

## 2022-09-25 RX ADMIN — OLANZAPINE 10 MG: 2.5 TABLET, FILM COATED ORAL at 07:09

## 2022-09-25 RX ADMIN — ATORVASTATIN CALCIUM 40 MG: 40 TABLET, FILM COATED ORAL at 08:09

## 2022-09-25 RX ADMIN — PANTOPRAZOLE SODIUM 40 MG: 40 TABLET, DELAYED RELEASE ORAL at 09:09

## 2022-09-25 RX ADMIN — POTASSIUM & SODIUM PHOSPHATES POWDER PACK 280-160-250 MG 2 PACKET: 280-160-250 PACK at 10:09

## 2022-09-25 RX ADMIN — HYDROXYZINE HYDROCHLORIDE 25 MG: 25 TABLET, FILM COATED ORAL at 08:09

## 2022-09-25 RX ADMIN — OLANZAPINE 10 MG: 2.5 TABLET, FILM COATED ORAL at 06:09

## 2022-09-25 RX ADMIN — POTASSIUM & SODIUM PHOSPHATES POWDER PACK 280-160-250 MG 2 PACKET: 280-160-250 PACK at 02:09

## 2022-09-25 RX ADMIN — FLUTICASONE FUROATE AND VILANTEROL TRIFENATATE 1 PUFF: 100; 25 POWDER RESPIRATORY (INHALATION) at 09:09

## 2022-09-25 RX ADMIN — DEXAMETHASONE SODIUM PHOSPHATE 4 MG: 4 INJECTION INTRA-ARTICULAR; INTRALESIONAL; INTRAMUSCULAR; INTRAVENOUS; SOFT TISSUE at 02:09

## 2022-09-25 RX ADMIN — FUROSEMIDE 20 MG: 20 TABLET ORAL at 10:09

## 2022-09-25 RX ADMIN — INSULIN ASPART 4 UNITS: 100 INJECTION, SOLUTION INTRAVENOUS; SUBCUTANEOUS at 01:09

## 2022-09-25 RX ADMIN — DEXAMETHASONE SODIUM PHOSPHATE 4 MG: 4 INJECTION INTRA-ARTICULAR; INTRALESIONAL; INTRAMUSCULAR; INTRAVENOUS; SOFT TISSUE at 05:09

## 2022-09-25 RX ADMIN — INSULIN ASPART 8 UNITS: 100 INJECTION, SOLUTION INTRAVENOUS; SUBCUTANEOUS at 04:09

## 2022-09-25 RX ADMIN — HYDROXYZINE HYDROCHLORIDE 25 MG: 25 TABLET, FILM COATED ORAL at 04:09

## 2022-09-25 RX ADMIN — INSULIN ASPART 4 UNITS: 100 INJECTION, SOLUTION INTRAVENOUS; SUBCUTANEOUS at 08:09

## 2022-09-25 RX ADMIN — Medication 1 PATCH: at 10:09

## 2022-09-25 RX ADMIN — OLANZAPINE 10 MG: 2.5 TABLET, FILM COATED ORAL at 11:09

## 2022-09-25 RX ADMIN — ASPIRIN 81 MG: 81 TABLET, COATED ORAL at 10:09

## 2022-09-25 RX ADMIN — POLYETHYLENE GLYCOL 3350 17 G: 17 POWDER, FOR SOLUTION ORAL at 10:09

## 2022-09-25 RX ADMIN — MELATONIN TAB 3 MG 6 MG: 3 TAB at 08:09

## 2022-09-25 RX ADMIN — HYDROCODONE BITARTRATE AND ACETAMINOPHEN 1 TABLET: 10; 325 TABLET ORAL at 10:09

## 2022-09-25 NOTE — PLAN OF CARE
Problem: SLP  Goal: SLP Goal  Description: Speech Language Pathology Goals  Goals expected to be met by 10/2    1. Patient will tolerate a regular diet and thin liquids with no overt signs of airway compromise.   2. Patient will participate in a speech language and cognitive evaluation when appropriate.       Outcome: Ongoing, Progressing

## 2022-09-25 NOTE — PT/OT/SLP EVAL
"Speech Language Pathology Evaluation  Bedside Swallow    Patient Name:  Kieran Styles   MRN:  [id number]  Admitting Diagnosis: Brain mass    Recommendations:                 General Recommendations:  Dysphagia therapy and Cognitive-linguistic evaluation  Diet recommendations:  Regular, Thin   Aspiration Precautions: No straws   General Precautions: Standard,    Communication strategies:  none    History:     Past Medical History:   Diagnosis Date    Coronary artery disease     Diabetes mellitus     GERD (gastroesophageal reflux disease)     Hypertension        Past Surgical History:   Procedure Laterality Date    APPENDECTOMY      CARDIAC SURGERY      JOINT REPLACEMENT       Subjective     Patient awake and alert.     "I want to get out of this bed"    Pain/Comfort:   No c/o pain    Respiratory Status: Room air    Objective:     Oral Musculature Evaluation  Oral Musculature: WFL  Dentition: scattered dentition  Secretion Management: adequate  Mucosal Quality: good  Mandibular Strength and Mobility: WFL  Oral Labial Strength and Mobility: WFL  Voice Prior to PO Intake: clear    Bedside Swallow Eval:   Consistencies Assessed:  Thin liquids via tsp cup and straw  Solids x3      Oral Phase:   WFL    Pharyngeal Phase:   coughing/choking s/p thin liquids via straw sips only. Patient reports "this happens about 3 times a day".   Otherwise no overt clinical signs/symptoms of aspiration  no overt clinical signs/symptoms of pharyngeal dysphagia    Compensatory Strategies  None    Assessment:     Kieran Styles is a 77 y.o. male with an SLP diagnosis of Dysphagia.      Goals:   Multidisciplinary Problems       SLP Goals          Problem: SLP    Goal Priority Disciplines Outcome   SLP Goal     SLP Ongoing, Progressing   Description: Speech Language Pathology Goals  Goals expected to be met by 10/2    1. Patient will tolerate a regular diet and thin liquids with no overt signs of airway compromise.   2. Patient will " participate in a speech language and cognitive evaluation when appropriate.                                  Plan:     Patient to be seen:  4 x/week   Plan of Care expires:     Plan of Care reviewed with:      SLP Follow-Up:  Yes       Discharge recommendations:  rehabilitation facility   Barriers to Discharge:  None    Time Tracking:     SLP Treatment Date:   09/25/22  Speech Start Time:  1105  Speech Stop Time:  1115     Speech Total Time (min):  10 min    Billable Minutes: Eval Swallow and Oral Function 10    09/25/2022

## 2022-09-25 NOTE — PLAN OF CARE
Problem: Adult Inpatient Plan of Care  Goal: Plan of Care Review  Outcome: Ongoing, Progressing  Goal: Patient-Specific Goal (Individualized)  Outcome: Ongoing, Progressing  Goal: Absence of Hospital-Acquired Illness or Injury  Outcome: Ongoing, Progressing  Goal: Optimal Comfort and Wellbeing  Outcome: Ongoing, Progressing  Goal: Readiness for Transition of Care  Outcome: Ongoing, Progressing     Problem: Fall Injury Risk  Goal: Absence of Fall and Fall-Related Injury  Outcome: Ongoing, Progressing     Problem: Restraint, Nonbehavioral (Nonviolent)  Goal: Absence of Harm or Injury  Outcome: Ongoing, Progressing     Problem: Diabetes Comorbidity  Goal: Blood Glucose Level Within Targeted Range  Outcome: Ongoing, Progressing     Problem: Skin Injury Risk Increased  Goal: Skin Health and Integrity  Outcome: Ongoing, Progressing     Problem: Impaired Wound Healing  Goal: Optimal Wound Healing  Outcome: Ongoing, Progressing

## 2022-09-25 NOTE — PROGRESS NOTES
"Charlie Bryan - Neurosurgery ([facility])  [facility] Medicine  Progress Note    Patient Name: Kieran Styles  MRN: [id number]  Patient Class: IP- Inpatient   Admission Date: 9/20/2022  Length of Stay: 5 days  Attending Physician: Edy Fried MD  Primary Care Provider: Sony Holt DO        Subjective:     Principal Problem:Brain mass        HPI:  76 yo PMHx DM2, CAD s/p CABG x 2 1997, history of adenomatous colon polyps (07/2017) revealing 10 mm sessile serrated adenoma, GERD, multiple pacemaker replacements most recent 2021 for AF (on warfarin), HTN, HLD, chronic back pain, MARQUIS on CPAP presenting to [facility] ED from [facility] due to a 2 week history of sudden memory deficits, disorientation, confsuion (date, situation, short-term memory), and multiple falls w/o head trauma or LOC or seizure. Wife noticed that he was forgetting where he was, what he had been doing. In addition, he has been getting weaker and more unstable on his feet. Yesterday evening, he fell while going to the bathroom and hit his elbow. Transported to EMS to [facility], where they did a CT head 09/20 this AM. Was discharged from [facility] because they initially told the wife that the CT scan was "clear", wife brought him to PCP and cardiologist OP office later that morning, but was called back by [facility] stating that they had mis-read the CT head and that the CT head was remarkable for apparent mass in brain.  Patient was subsequently transferred via EMS to [facility].  [facility] staff informed the wife that they were concerned for possible cancer, and wanted him to see neurosurgery evaluation.  Patient wife denies history cancer to her knowledge.  Patient is an active smoker smoking 1-2 packs per day, and has smoked for the last 30 years.  Social alcohol use, no other illicit substances.    On my initial evaluation, patient is conversational; however, he is only oriented to " self and time.  He exhibits short-term memory loss, and is not sure why he is here.  Overall, patient is a poor historian.  Family was not at bedside.  This history of present illness was obtained via telephone conversation with his wife ([phone] Donna Styles).  ROS includes denies chest pain, abdominal pain, nausea, vomiting, shortness of breath, cough, diarrhea, constipation, blood per rectum, fevers, chills, muscle aches, new weakness, new sensory changes.  Patient reports history of chronic lower back pain.    ED course:  Neurosurgery evaluated at [facility], concerns for metastasis.  No plans for surgical intervention at this time.  Subsequently pan CT scan pending.    PMHx/PSHx:  DM2, CAD s/p CABG x 2 , multiple pacemaker replacements most recent  for AF (on warfarin), HTN, HLD, chronic back pain, MARQUIS on CPAP    FMHx: Mother with breast cancer, . Sister NAFLD w/ cirrhosis, .      Overview/Hospital Course:  Patient presenting with sudden memory deficits, AMS, and multiple falls who was transferred from [facility] to [facility] for NSGY evaluation of the CT head () finding of a brain mass concerning for cancer. No plans for surgical intervention at this time. CT A/P with con demonstrated a pulmonary nodule, bony lesions in left ilium, L3 and L4 vertebral bodies concerning for metastasis. Patient's pacemaker is compatible with MRI; however, the leads are not therefore patient cannot undergo MRI. NSGY palnning for CT head on  to assess for interval change. ID consulted and believes brain mass with pulmonary nodule is likely 2/2 malignancy given h/o significant smoking and recommended IR bx of pulmonary nodule in addition to further infectious w/u. IR was consulted and plan to perform LP under fleuro on  and recommended pulmonary nodule bx outpatient.        Interval History: Patient agitated overnight tried pulling lines and getting out of bed. Patient's pain well  controlled with multimodal analgesics.     Review of Systems   Constitutional:  Negative for chills and fever.   HENT:  Negative for rhinorrhea, sneezing, sore throat and trouble swallowing.    Respiratory:  Negative for cough and shortness of breath.    Cardiovascular:  Negative for chest pain and palpitations.   Gastrointestinal:  Negative for constipation, diarrhea, nausea and vomiting.   Genitourinary:  Negative for dysuria, frequency and urgency.   Musculoskeletal:  Positive for arthralgias and back pain. Negative for myalgias.   Skin:  Negative for rash and wound.   Neurological:  Negative for syncope, facial asymmetry, speech difficulty, weakness, light-headedness and headaches.   Psychiatric/Behavioral:  Positive for agitation, confusion and decreased concentration.    Objective:     Vital Signs (Most Recent):  Temp: 97.3 °F (36.3 °C) (09/25/22 0817)  Pulse: 79 (09/25/22 0941)  Resp: 18 (09/25/22 0941)  BP: 113/66 (09/25/22 0817)  SpO2: (!) 94 % (09/25/22 0941) Vital Signs (24h Range):  Temp:  [96.7 °F (35.9 °C)-97.6 °F (36.4 °C)] 97.3 °F (36.3 °C)  Pulse:  [70-87] 79  Resp:  [16-22] 18  SpO2:  [88 %-94 %] 94 %  BP: (113-163)/(63-71) 113/66     Weight: 86.2 kg (190 lb)  Body mass index is 25.77 kg/m².    Intake/Output Summary (Last 24 hours) at 9/25/2022 1000  Last data filed at 9/24/2022 1800  Gross per 24 hour   Intake 480 ml   Output --   Net 480 ml        Physical Exam  Vitals and nursing note reviewed.   Constitutional:       General: He is not in acute distress.     Appearance: He is obese. He is not ill-appearing or diaphoretic.   HENT:      Right Ear: External ear normal.      Left Ear: External ear normal.      Nose: Nose normal.   Eyes:      General:         Right eye: No discharge.         Left eye: No discharge.      Conjunctiva/sclera: Conjunctivae normal.   Cardiovascular:      Rate and Rhythm: Normal rate and regular rhythm.      Heart sounds: Normal heart sounds. No murmur heard.  Pulmonary:       Effort: Pulmonary effort is normal. No respiratory distress.      Breath sounds: Normal breath sounds. No wheezing or rales.   Abdominal:      General: Abdomen is flat. There is no distension.      Palpations: There is no mass.      Tenderness: There is no abdominal tenderness.   Musculoskeletal:         General: No swelling, tenderness or deformity. Normal range of motion.      Cervical back: Normal range of motion and neck supple.      Right lower leg: No edema.      Left lower leg: No edema.   Skin:     General: Skin is warm.      Coloration: Skin is not jaundiced.      Findings: No bruising.   Neurological:      Mental Status: He is alert.      Cranial Nerves: No cranial nerve deficit.      Motor: Weakness (left sided, LUE weak 2/2 h/o Polio) present.      Comments: Oriented to person and year but not to place  Poor memory recall       Significant Labs: All pertinent labs within the past 24 hours have been reviewed.    Significant Imaging: I have reviewed all pertinent imaging results/findings within the past 24 hours.      Assessment/Plan:      * Brain mass  77-year-old male with extensive past medical history presenting from [facility] for Neurosurgery evaluation of a brain mass found on CT head.  Per patient wife, patient has had sudden memory problems in the last 2 weeks with recurrent falls, but without loss of consciousness, seizure activity, head trauma.  Baseline is normal without other limitations.  Patient also has been describing 1 year of headache.  Has seen neurology outpatient, and has been taking gabapentin and undergoing nerve block procedures in neck with little effect. 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     Concerns for metastatic disease, possibly lung as primary given extensive smoking history.    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  - CTH with  contrast 9/20: interventricular diffuse enhancing mass in lateral vents and 3rd vent  - CT chest/abdo/pelvis 9/21: 1.1 cm solid solitary RUL nodule, additional micronodule, hepatosplenomegaly, nonsepcific lucent lesions in L ilum, L3 and L4    Patient's pacemaker leads are NOT compatible with MRI    Plan:   -- NSGY evaluated, appreciate recs  -- CT head for 9/26, will f/u  --SBP <160  --Na >135  --Dex 4mg q6  --PUD PPx while steroid treatment ongoing, patient on home pantoprazole 40mg qd  --Continue to monitor clinically, notify NSGY immediately with any changes in neuro status  -- ID consulted to r/o infectious etiology of mass   - f/u toxoplasma IgG and IgM, immunodeficiency panel, serum cryptococcus antibody  -- IR consulted, plan for LP under fleuro on 9/26   - f/u CSF labs: cell count with diff, protein, glucose, gram stain, culture, cytology, and flow cytometry   - pulmonary nodule bx can be done outpatient with studies (pathology, cytology, aerobic/anaerobic/fungal/AFB cultures)  -- zyprexa 10mg nightly for agitation, 5mg BID PRN for breakthrough  -- no immediate surgical intervention planned  -- Neuro check q4h  -- Fall, Aspiration, Delirium precautions    Dermatitis associated with moisture  Wound care consulted, appreciate recs      COPD (chronic obstructive pulmonary disease)  On Anoro. Rx of singular but not taking.    -- Continue formulary equivalent of inhaler  -- PRN duoneb  -- SpO2 goal 88-92       Restless leg  -- Continue home ropinirole       GERD (gastroesophageal reflux disease)  -- Continue home PPI      Chronic back pain  -- MM pain regimen PRN   Tylenol PRN   Home Narco PRN   Lidocaine patches      Hypertension  Home medications: atenolol 50 qd, losartan-HCTZ 100-25 mg qd    -- Continue home medications as tolerated      MARQUIS on CPAP  -- Maintain nightly CPAP  -- Patient wife to bring home CPAP      Pacemaker  Pacemaker is compatible with MRI but the leads are NOT compatible with  MRI      Atrial fibrillation  Chronic anticoagulation use w/ warfarin  S/P pacemaker with multiple replacements    Home meds: Warfarin 7.5 qd, atenolol 50 qd  [id number]      -- Discontinued home warfarin in the case of NSGY intervention  -- Lovenox bridge while off warfarin, held for LP on 9/26 - will restart when appropriate   -- h/o stroke while off AC for a past colonoscopy  -- Continue home ASA, statin, and atenolol  -- Daily INR   -- Cardiac telemetry  -- Maintain K > 4, Mg > 2, Ca wnl; replete PRN  -- STAT cardiology consult if hemodynamic instability for DCCV evaluation        Hyperlipidemia  - See CAD involving coronary bypass graft      Coronary artery disease involving coronary bypass graft  CABG x 2 1997  Dyslipidemia    -- Continue ASA 81  -- Continue HI statin    Type 2 diabetes mellitus, without long-term current use of insulin  Patient's FSGs are controlled on current medication regimen.  Last A1c reviewed-   Lab Results   Component Value Date    HGBA1C 6.7 (H) 09/20/2022     Most recent fingerstick glucose reviewed-   Recent Labs   Lab 09/24/22  1603 09/24/22  2053 09/25/22  1201   POCTGLUCOSE 263* 262* 282*     Current correctional scale  Medium  Maintain anti-hyperglycemic dose as follows-   Antihyperglycemics (From admission, onward)    Start     Stop Route Frequency Ordered    09/25/22 2100  insulin detemir U-100 pen 7 Units         -- SubQ Nightly 09/25/22 1056    09/20/22 2250  insulin aspart U-100 pen 1-10 Units         -- SubQ Before meals & nightly PRN 09/20/22 2152      Home medications: metformin, pioglitazone, Victoza    -- Hold oral hyperglycemics  -- SSI, POCT BG qACHS; titrate basal/bolus regimen PRN to 140-180 goal      VTE Risk Mitigation (From admission, onward)         Ordered     Reason for No Pharmacological VTE Prophylaxis  Once        Question:  Reasons:  Answer:  Already adequately anticoagulated on oral Anticoagulants    09/20/22 2152     IP VTE HIGH RISK PATIENT  Once          09/20/22 2152     Place sequential compression device  Until discontinued         09/20/22 2039                Discharge Planning   ANITA: 9/27/2022     Code Status: Full Code   Is the patient medically ready for discharge?: No    Reason for patient still in hospital (select all that apply): Patient trending condition, Laboratory test, Treatment, Imaging and Consult recommendations  Discharge Plan A: Other (TBD)                  Sarah Llanos MD  Department of Hospital Medicine   [facility] - Neurosurgery ([facility])

## 2022-09-25 NOTE — SUBJECTIVE & OBJECTIVE
Interval History: Patient agitated overnight tried pulling lines and getting out of bed. Patient's pain well controlled with multimodal analgesics.     Review of Systems   Constitutional:  Negative for chills and fever.   HENT:  Negative for rhinorrhea, sneezing, sore throat and trouble swallowing.    Respiratory:  Negative for cough and shortness of breath.    Cardiovascular:  Negative for chest pain and palpitations.   Gastrointestinal:  Negative for constipation, diarrhea, nausea and vomiting.   Genitourinary:  Negative for dysuria, frequency and urgency.   Musculoskeletal:  Positive for arthralgias and back pain. Negative for myalgias.   Skin:  Negative for rash and wound.   Neurological:  Negative for syncope, facial asymmetry, speech difficulty, weakness, light-headedness and headaches.   Psychiatric/Behavioral:  Positive for agitation, confusion and decreased concentration.    Objective:     Vital Signs (Most Recent):  Temp: 97.3 °F (36.3 °C) (09/25/22 0817)  Pulse: 79 (09/25/22 0941)  Resp: 18 (09/25/22 0941)  BP: 113/66 (09/25/22 0817)  SpO2: (!) 94 % (09/25/22 0941) Vital Signs (24h Range):  Temp:  [96.7 °F (35.9 °C)-97.6 °F (36.4 °C)] 97.3 °F (36.3 °C)  Pulse:  [70-87] 79  Resp:  [16-22] 18  SpO2:  [88 %-94 %] 94 %  BP: (113-163)/(63-71) 113/66     Weight: 86.2 kg (190 lb)  Body mass index is 25.77 kg/m².    Intake/Output Summary (Last 24 hours) at 9/25/2022 1000  Last data filed at 9/24/2022 1800  Gross per 24 hour   Intake 480 ml   Output --   Net 480 ml        Physical Exam  Vitals and nursing note reviewed.   Constitutional:       General: He is not in acute distress.     Appearance: He is obese. He is not ill-appearing or diaphoretic.   HENT:      Right Ear: External ear normal.      Left Ear: External ear normal.      Nose: Nose normal.   Eyes:      General:         Right eye: No discharge.         Left eye: No discharge.      Conjunctiva/sclera: Conjunctivae normal.   Cardiovascular:      Rate and  Rhythm: Normal rate and regular rhythm.      Heart sounds: Normal heart sounds. No murmur heard.  Pulmonary:      Effort: Pulmonary effort is normal. No respiratory distress.      Breath sounds: Normal breath sounds. No wheezing or rales.   Abdominal:      General: Abdomen is flat. There is no distension.      Palpations: There is no mass.      Tenderness: There is no abdominal tenderness.   Musculoskeletal:         General: No swelling, tenderness or deformity. Normal range of motion.      Cervical back: Normal range of motion and neck supple.      Right lower leg: No edema.      Left lower leg: No edema.   Skin:     General: Skin is warm.      Coloration: Skin is not jaundiced.      Findings: No bruising.   Neurological:      Mental Status: He is alert.      Cranial Nerves: No cranial nerve deficit.      Motor: Weakness (left sided, LUE weak 2/2 h/o Polio) present.      Comments: Oriented to person and year but not to place  Poor memory recall       Significant Labs: All pertinent labs within the past 24 hours have been reviewed.    Significant Imaging: I have reviewed all pertinent imaging results/findings within the past 24 hours.

## 2022-09-25 NOTE — ASSESSMENT & PLAN NOTE
Chronic anticoagulation use w/ warfarin  S/P pacemaker with multiple replacements    Home meds: Warfarin 7.5 qd, atenolol 50 qd  [id number] 8      -- Discontinued home warfarin in the case of NSGY intervention  -- Lovenox bridge while off warfarin, held for LP on 9/26 - will restart when appropriate   -- h/o stroke while off AC for a past colonoscopy  -- Continue home ASA, statin, and atenolol  -- Daily INR   -- Cardiac telemetry  -- Maintain K > 4, Mg > 2, Ca wnl; replete PRN  -- STAT cardiology consult if hemodynamic instability for DCCV evaluation

## 2022-09-25 NOTE — NURSING
Pt restless, agitated and uncooperative.  Attempting to get OOB, told about the risks factors of getting OOB.  Difficult to redirect.  Team paged and the nurse spoke to Dr. Griffith.  Restraints were ordered and Dr. Griffith informed the nurse that he will come and check on the pt.    22.15 Dr. Griffith at the bedside, assessing the pt.  WCTM.

## 2022-09-25 NOTE — PROGRESS NOTES
Charlie Bryan - Neurosurgery ([facility])  Neurosurgery  Progress Note    Subjective:     History of Present Illness: Patient is a 77M with PMH of HTN, DM, polio, CAD (on ASA/Coumadin) who presents with multiple falls (including this morning). Patient was transferred from OSH for NSGY for possible brain mass.    Patient has a pacemaker so it is uncertain whether or not he can get an MRI. He is originally from [facility] and was transferred here for care. Patient denies head trauma with his fall this morning. He notes that he has started using a walker in the past few weeks. Patient complains of L leg pain, likely related to his fall. 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      Post-Op Info:  * No surgery found *         Interval History: Plan for repeat CTH on Monday. Neuro unchanged. Surgical planning pending repeat CTH    Medications:  Continuous Infusions:  Scheduled Meds:   aspirin  81 mg Oral Daily    atenoloL  50 mg Oral Daily    atorvastatin  40 mg Oral QHS    dexamethasone  4 mg Intravenous Q6H    enoxaparin  40 mg Subcutaneous Daily    fluticasone furoate-vilanteroL  1 puff Inhalation Daily    furosemide  20 mg Oral Daily    insulin detemir U-100  5 Units Subcutaneous QHS    LIDOcaine  1 patch Transdermal Q24H    losartan-hydrochlorothiazide 100-25 mg  1 tablet Oral Daily    nicotine  1 patch Transdermal Daily    OLANZapine  10 mg Oral QHS    pantoprazole  40 mg Oral Daily    potassium, sodium phosphates  2 packet Oral Q4H    rOPINIRole  1 mg Oral QHS     PRN Meds:acetaminophen, albuterol-ipratropium, aluminum-magnesium hydroxide-simethicone, dextrose 10%, dextrose 10%, glucagon (human recombinant), glucose, glucose, HYDROcodone-acetaminophen, hydrOXYzine HCL, insulin aspart U-100, melatonin, naloxone, OLANZapine, polyethylene glycol, senna-docusate 8.6-50 mg, sodium chloride 0.9%, sodium chloride 0.9%     Review of Systems  Objective:     Weight: 86.2 kg (190  lb)  Body mass index is 25.77 kg/m².  Vital Signs (Most Recent):  Temp: 97.3 °F (36.3 °C) (09/25/22 0817)  Pulse: 79 (09/25/22 0941)  Resp: 18 (09/25/22 0941)  BP: 113/66 (09/25/22 0817)  SpO2: (!) 94 % (09/25/22 0941) Vital Signs (24h Range):  Temp:  [96.7 °F (35.9 °C)-97.6 °F (36.4 °C)] 97.3 °F (36.3 °C)  Pulse:  [70-87] 79  Resp:  [16-22] 18  SpO2:  [88 %-94 %] 94 %  BP: (113-163)/(63-71) 113/66                          Physical Exam    Neurosurgery Physical Exam  General: AOx3, GCS [id number], higher order confusion  CNII-XII: Intact on fine exam, PERRL, visual fields grossly intact, EOMI, facial sensation preserved, no facial asymmetry, tongue midline, shoulder shrug equal, no pronator drift  Extremities: LUE contracted at baseline (4/5 motor), 5/5 motor otherwise, sensorium intact throughout, coordination intact throughout, DTRs 2+, no pathological reflexes, no sensory level present    Significant Labs:  Recent Labs   Lab 09/24/22  0611 09/25/22  0508   * 267*   * 136   K 4.4 4.0   CL 97 103   CO2 23 21*   BUN 32* 40*   CREATININE 1.0 1.1   CALCIUM 9.4 9.5   MG 1.8 1.9       Recent Labs   Lab 09/24/22  0611 09/25/22  0508   WBC 12.62 15.86*   HGB 15.2 14.8   HCT 46.6 45.3    209       Recent Labs   Lab 09/24/22  0611 09/25/22  0509   INR 1.7* 1.3*       Microbiology Results (last 7 days)       Procedure Component Value Units Date/Time    Blood culture [[id number]] Collected: 09/23/22 1546    Order Status: Completed Specimen: Blood from Peripheral, Left Arm Updated: 09/24/22 1812     Blood Culture, Routine No Growth to date      No Growth to date    Blood culture [[id number]] Collected: 09/23/22 1546    Order Status: Completed Specimen: Blood from Peripheral, Right Arm Updated: 09/24/22 1812     Blood Culture, Routine No Growth to date      No Growth to date          All pertinent labs from the last 24 hours have been reviewed.    Significant Diagnostics:  I have reviewed all pertinent imaging  results/findings within the past 24 hours.    Assessment/Plan:     * Brain mass  Patient is a 77M with PMH of HTN, DM, polio, CAD (on ASA/Coumadin with pacemaker) who presents with multiple falls (including this morning). Patient was transferred from OSH for NSGY for possible brain mass.    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   --CTH with contrast 9/20: interventricular diffuse enhancing mass in lateral vents and 3rd vent   --CT chest/abdo/pelvis 9/21: 1.1 cm solid solitary RUL nodule, additional micronodule, hepatosplenomegaly, nonsepcific lucent lesions in L ilum, L3 and L4   --Cannot do MRI - pacemaker MRI compatible, but leads not   --tentative plan for repeat CTH w wo con on Monday   --Repeat CTH with contrast Monday  --Surgical plan pending repeat scan.   --SBP <160  --Na >135  --Dex 4q6  --PUD PPx while steroid treatment ongoing  --Continue to monitor clinically, notify NSGY immediately with any changes in neuro status        Bernabe Castle MD  Neurosurgery  [facility] - Neurosurgery ([facility])

## 2022-09-25 NOTE — ASSESSMENT & PLAN NOTE
Patient's FSGs are controlled on current medication regimen.  Last A1c reviewed-   Lab Results   Component Value Date    HGBA1C 6.7 (H) 09/20/2022     Most recent fingerstick glucose reviewed-   Recent Labs   Lab 09/24/22  1603 09/24/22 2053 09/25/22  1201   POCTGLUCOSE 263* 262* 282*     Current correctional scale  Medium  Maintain anti-hyperglycemic dose as follows-   Antihyperglycemics (From admission, onward)    Start     Stop Route Frequency Ordered    09/25/22 2100  insulin detemir U-100 pen 7 Units         -- SubQ Nightly 09/25/22 1056    09/20/22 2250  insulin aspart U-100 pen 1-10 Units         -- SubQ Before meals & nightly PRN 09/20/22 2152      Home medications: metformin, pioglitazone, Victoza    -- Hold oral hyperglycemics  -- SSI, POCT BG qACHS; titrate basal/bolus regimen PRN to 140-180 goal

## 2022-09-25 NOTE — ASSESSMENT & PLAN NOTE
Patient is a 77M with PMH of HTN, DM, polio, CAD (on ASA/Coumadin with pacemaker) who presents with multiple falls (including this morning). Patient was transferred from OSH for NSGY for possible brain mass.    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   --CTH with contrast 9/20: interventricular diffuse enhancing mass in lateral vents and 3rd vent   --CT chest/abdo/pelvis 9/21: 1.1 cm solid solitary RUL nodule, additional micronodule, hepatosplenomegaly, nonsepcific lucent lesions in L ilum, L3 and L4   --Cannot do MRI - pacemaker MRI compatible, but leads not   --tentative plan for repeat CTH w wo con on Monday   --Repeat CTH with contrast Monday  --Surgical plan pending repeat scan.   --SBP <160  --Na >135  --Dex 4q6  --PUD PPx while steroid treatment ongoing  --Continue to monitor clinically, notify NSGY immediately with any changes in neuro status

## 2022-09-25 NOTE — SUBJECTIVE & OBJECTIVE
Interval History: Plan for repeat CTH on Monday. Neuro unchanged. Surgical planning pending repeat CTH    Medications:  Continuous Infusions:  Scheduled Meds:   aspirin  81 mg Oral Daily    atenoloL  50 mg Oral Daily    atorvastatin  40 mg Oral QHS    dexamethasone  4 mg Intravenous Q6H    enoxaparin  40 mg Subcutaneous Daily    fluticasone furoate-vilanteroL  1 puff Inhalation Daily    furosemide  20 mg Oral Daily    insulin detemir U-100  5 Units Subcutaneous QHS    LIDOcaine  1 patch Transdermal Q24H    losartan-hydrochlorothiazide 100-25 mg  1 tablet Oral Daily    nicotine  1 patch Transdermal Daily    OLANZapine  10 mg Oral QHS    pantoprazole  40 mg Oral Daily    potassium, sodium phosphates  2 packet Oral Q4H    rOPINIRole  1 mg Oral QHS     PRN Meds:acetaminophen, albuterol-ipratropium, aluminum-magnesium hydroxide-simethicone, dextrose 10%, dextrose 10%, glucagon (human recombinant), glucose, glucose, HYDROcodone-acetaminophen, hydrOXYzine HCL, insulin aspart U-100, melatonin, naloxone, OLANZapine, polyethylene glycol, senna-docusate 8.6-50 mg, sodium chloride 0.9%, sodium chloride 0.9%     Review of Systems  Objective:     Weight: 86.2 kg (190 lb)  Body mass index is 25.77 kg/m².  Vital Signs (Most Recent):  Temp: 97.3 °F (36.3 °C) (09/25/22 0817)  Pulse: 79 (09/25/22 0941)  Resp: 18 (09/25/22 0941)  BP: 113/66 (09/25/22 0817)  SpO2: (!) 94 % (09/25/22 0941) Vital Signs (24h Range):  Temp:  [96.7 °F (35.9 °C)-97.6 °F (36.4 °C)] 97.3 °F (36.3 °C)  Pulse:  [70-87] 79  Resp:  [16-22] 18  SpO2:  [88 %-94 %] 94 %  BP: (113-163)/(63-71) 113/66                          Physical Exam    Neurosurgery Physical Exam  General: AOx3, GCS [id number], higher order confusion  CNII-XII: Intact on fine exam, PERRL, visual fields grossly intact, EOMI, facial sensation preserved, no facial asymmetry, tongue midline, shoulder shrug equal, no pronator drift  Extremities: LUE contracted at baseline (4/5 motor), 5/5 motor otherwise,  sensorium intact throughout, coordination intact throughout, DTRs 2+, no pathological reflexes, no sensory level present    Significant Labs:  Recent Labs   Lab 09/24/22  0611 09/25/22  0508   * 267*   * 136   K 4.4 4.0   CL 97 103   CO2 23 21*   BUN 32* 40*   CREATININE 1.0 1.1   CALCIUM 9.4 9.5   MG 1.8 1.9       Recent Labs   Lab 09/24/22  0611 09/25/22  0508   WBC 12.62 15.86*   HGB 15.2 14.8   HCT 46.6 45.3    209       Recent Labs   Lab 09/24/22  0611 09/25/22  0509   INR 1.7* 1.3*       Microbiology Results (last 7 days)       Procedure Component Value Units Date/Time    Blood culture [[id number]] Collected: 09/23/22 1546    Order Status: Completed Specimen: Blood from Peripheral, Left Arm Updated: 09/24/22 1812     Blood Culture, Routine No Growth to date      No Growth to date    Blood culture [[id number]] Collected: 09/23/22 1546    Order Status: Completed Specimen: Blood from Peripheral, Right Arm Updated: 09/24/22 1812     Blood Culture, Routine No Growth to date      No Growth to date          All pertinent labs from the last 24 hours have been reviewed.    Significant Diagnostics:  I have reviewed all pertinent imaging results/findings within the past 24 hours.

## 2022-09-25 NOTE — PLAN OF CARE
Problem: Fall Injury Risk  Goal: Absence of Fall and Fall-Related Injury  Outcome: Ongoing, Progressing  Intervention: Promote Injury-Free Environment  Flowsheets (Taken 9/25/2022 0228)  Safety Promotion/Fall Prevention:   bed alarm set   commode/urinal/bedpan at bedside   Fall Risk reviewed with patient/family   Fall Risk signage in place   family to remain at bedside   nonskid shoes/socks when out of bed   /camera at bedside   supervised activity   side rails raised x 3     Problem: Diabetes Comorbidity  Goal: Blood Glucose Level Within Targeted Range  Outcome: Ongoing, Progressing  Intervention: Monitor and Manage Glycemia  Flowsheets (Taken 9/25/2022 0228)  Glycemic Management: blood glucose monitored     Problem: Skin Injury Risk Increased  Goal: Skin Health and Integrity  Outcome: Ongoing, Progressing  Intervention: Optimize Skin Protection  Flowsheets (Taken 9/25/2022 0228)  Pressure Reduction Techniques: frequent weight shift encouraged  Pressure Reduction Devices: positioning supports utilized  Skin Protection:   adhesive use limited   tubing/devices free from skin contact  Head of Bed (HOB) Positioning: HOB elevated   POC reviewed with pt and wife at the bedside.  Wife verbalized understanding, pt confused.  Was restless and agitated, meds administered.  Activities being monitored, has a telesitter in room and safety maintained.  Blood glucose managed, insulin administered.  VSS.  Remains free from falls.  Bed alarm set, call light within reach.

## 2022-09-26 ENCOUNTER — ANESTHESIA (OUTPATIENT)
Dept: ENDOSCOPY | Facility: HOSPITAL | Age: 77
DRG: 055 | End: 2022-09-26
Payer: MEDICARE

## 2022-09-26 LAB
ALBUMIN SERPL BCP-MCNC: 3.5 G/DL (ref 3.5–5.2)
ALP SERPL-CCNC: 49 U/L (ref 55–135)
ALT SERPL W/O P-5'-P-CCNC: 8 U/L (ref 10–44)
ANION GAP SERPL CALC-SCNC: 11 MMOL/L (ref 8–16)
AST SERPL-CCNC: 11 U/L (ref 10–40)
BASOPHILS # BLD AUTO: 0.02 K/UL (ref 0–0.2)
BASOPHILS NFR BLD: 0.2 % (ref 0–1.9)
BILIRUB SERPL-MCNC: 0.6 MG/DL (ref 0.1–1)
BUN SERPL-MCNC: 33 MG/DL (ref 8–23)
CALCIUM SERPL-MCNC: 9.4 MG/DL (ref 8.7–10.5)
CHLORIDE SERPL-SCNC: 106 MMOL/L (ref 95–110)
CO2 SERPL-SCNC: 22 MMOL/L (ref 23–29)
CREAT SERPL-MCNC: 1.1 MG/DL (ref 0.5–1.4)
DIFFERENTIAL METHOD: ABNORMAL
EOSINOPHIL # BLD AUTO: 0 K/UL (ref 0–0.5)
EOSINOPHIL NFR BLD: 0 % (ref 0–8)
ERYTHROCYTE [DISTWIDTH] IN BLOOD BY AUTOMATED COUNT: 16.2 % (ref 11.5–14.5)
EST. GFR  (NO RACE VARIABLE): >60 ML/MIN/1.73 M^2
GLUCOSE SERPL-MCNC: 289 MG/DL (ref 70–110)
HCT VFR BLD AUTO: 45.9 % (ref 40–54)
HGB BLD-MCNC: 14.4 G/DL (ref 14–18)
IMM GRANULOCYTES # BLD AUTO: 0.13 K/UL (ref 0–0.04)
IMM GRANULOCYTES NFR BLD AUTO: 1.1 % (ref 0–0.5)
INR PPP: 1.2 (ref 0.8–1.2)
LYMPHOCYTES # BLD AUTO: 0.7 K/UL (ref 1–4.8)
LYMPHOCYTES NFR BLD: 5.9 % (ref 18–48)
MAGNESIUM SERPL-MCNC: 2 MG/DL (ref 1.6–2.6)
MCH RBC QN AUTO: 29.4 PG (ref 27–31)
MCHC RBC AUTO-ENTMCNC: 31.4 G/DL (ref 32–36)
MCV RBC AUTO: 94 FL (ref 82–98)
MONOCYTES # BLD AUTO: 0.5 K/UL (ref 0.3–1)
MONOCYTES NFR BLD: 4.3 % (ref 4–15)
NEUTROPHILS # BLD AUTO: 10.9 K/UL (ref 1.8–7.7)
NEUTROPHILS NFR BLD: 88.5 % (ref 38–73)
NRBC BLD-RTO: 0 /100 WBC
PHOSPHATE SERPL-MCNC: 2.7 MG/DL (ref 2.7–4.5)
PLATELET # BLD AUTO: 195 K/UL (ref 150–450)
PMV BLD AUTO: 12.1 FL (ref 9.2–12.9)
POCT GLUCOSE: 250 MG/DL (ref 70–110)
POCT GLUCOSE: 264 MG/DL (ref 70–110)
POCT GLUCOSE: 300 MG/DL (ref 70–110)
POCT GLUCOSE: 317 MG/DL (ref 70–110)
POTASSIUM SERPL-SCNC: 4.4 MMOL/L (ref 3.5–5.1)
PROT SERPL-MCNC: 7.1 G/DL (ref 6–8.4)
PROTHROMBIN TIME: 12.2 SEC (ref 9–12.5)
RBC # BLD AUTO: 4.9 M/UL (ref 4.6–6.2)
SODIUM SERPL-SCNC: 139 MMOL/L (ref 136–145)
WBC # BLD AUTO: 12.27 K/UL (ref 3.9–12.7)

## 2022-09-26 PROCEDURE — S4991 NICOTINE PATCH NONLEGEND: HCPCS

## 2022-09-26 PROCEDURE — 27000221 HC OXYGEN, UP TO 24 HOURS

## 2022-09-26 PROCEDURE — 99232 SBSQ HOSP IP/OBS MODERATE 35: CPT | Mod: ,,, | Performed by: NEUROLOGICAL SURGERY

## 2022-09-26 PROCEDURE — 83735 ASSAY OF MAGNESIUM: CPT

## 2022-09-26 PROCEDURE — 25000003 PHARM REV CODE 250

## 2022-09-26 PROCEDURE — 99900035 HC TECH TIME PER 15 MIN (STAT)

## 2022-09-26 PROCEDURE — 99232 PR SUBSEQUENT HOSPITAL CARE,LEVL II: ICD-10-PCS | Mod: ,,, | Performed by: NEUROLOGICAL SURGERY

## 2022-09-26 PROCEDURE — 63600175 PHARM REV CODE 636 W HCPCS: Performed by: STUDENT IN AN ORGANIZED HEALTH CARE EDUCATION/TRAINING PROGRAM

## 2022-09-26 PROCEDURE — 94640 AIRWAY INHALATION TREATMENT: CPT

## 2022-09-26 PROCEDURE — 99233 SBSQ HOSP IP/OBS HIGH 50: CPT | Mod: GC,,, | Performed by: HOSPITALIST

## 2022-09-26 PROCEDURE — 97530 THERAPEUTIC ACTIVITIES: CPT

## 2022-09-26 PROCEDURE — 92523 SPEECH SOUND LANG COMPREHEN: CPT

## 2022-09-26 PROCEDURE — 99233 PR SUBSEQUENT HOSPITAL CARE,LEVL III: ICD-10-PCS | Mod: GC,,, | Performed by: HOSPITALIST

## 2022-09-26 PROCEDURE — 97116 GAIT TRAINING THERAPY: CPT

## 2022-09-26 PROCEDURE — 11000001 HC ACUTE MED/SURG PRIVATE ROOM

## 2022-09-26 PROCEDURE — 85610 PROTHROMBIN TIME: CPT

## 2022-09-26 PROCEDURE — 85025 COMPLETE CBC W/AUTO DIFF WBC: CPT

## 2022-09-26 PROCEDURE — 80053 COMPREHEN METABOLIC PANEL: CPT

## 2022-09-26 PROCEDURE — 92526 ORAL FUNCTION THERAPY: CPT

## 2022-09-26 PROCEDURE — 84100 ASSAY OF PHOSPHORUS: CPT

## 2022-09-26 PROCEDURE — 25500020 PHARM REV CODE 255: Performed by: HOSPITALIST

## 2022-09-26 PROCEDURE — 63600175 PHARM REV CODE 636 W HCPCS

## 2022-09-26 PROCEDURE — 94660 CPAP INITIATION&MGMT: CPT

## 2022-09-26 PROCEDURE — 36415 COLL VENOUS BLD VENIPUNCTURE: CPT

## 2022-09-26 PROCEDURE — 94761 N-INVAS EAR/PLS OXIMETRY MLT: CPT

## 2022-09-26 PROCEDURE — 97535 SELF CARE MNGMENT TRAINING: CPT

## 2022-09-26 RX ORDER — INSULIN ASPART 100 [IU]/ML
5 INJECTION, SOLUTION INTRAVENOUS; SUBCUTANEOUS
Status: DISCONTINUED | OUTPATIENT
Start: 2022-09-26 | End: 2022-09-27

## 2022-09-26 RX ADMIN — LOSARTAN POTASSIUM AND HYDROCHLOROTHIAZIDE 1 TABLET: 100; 25 TABLET, FILM COATED ORAL at 08:09

## 2022-09-26 RX ADMIN — PANTOPRAZOLE SODIUM 40 MG: 40 TABLET, DELAYED RELEASE ORAL at 08:09

## 2022-09-26 RX ADMIN — INSULIN ASPART 6 UNITS: 100 INJECTION, SOLUTION INTRAVENOUS; SUBCUTANEOUS at 11:09

## 2022-09-26 RX ADMIN — DEXAMETHASONE SODIUM PHOSPHATE 4 MG: 4 INJECTION INTRA-ARTICULAR; INTRALESIONAL; INTRAMUSCULAR; INTRAVENOUS; SOFT TISSUE at 05:09

## 2022-09-26 RX ADMIN — FLUTICASONE FUROATE AND VILANTEROL TRIFENATATE 1 PUFF: 100; 25 POWDER RESPIRATORY (INHALATION) at 09:09

## 2022-09-26 RX ADMIN — INSULIN ASPART 3 UNITS: 100 INJECTION, SOLUTION INTRAVENOUS; SUBCUTANEOUS at 09:09

## 2022-09-26 RX ADMIN — HYDROXYZINE HYDROCHLORIDE 25 MG: 25 TABLET, FILM COATED ORAL at 06:09

## 2022-09-26 RX ADMIN — INSULIN ASPART 5 UNITS: 100 INJECTION, SOLUTION INTRAVENOUS; SUBCUTANEOUS at 11:09

## 2022-09-26 RX ADMIN — INSULIN ASPART 5 UNITS: 100 INJECTION, SOLUTION INTRAVENOUS; SUBCUTANEOUS at 04:09

## 2022-09-26 RX ADMIN — ROPINIROLE HYDROCHLORIDE 1 MG: 1 TABLET, FILM COATED ORAL at 09:09

## 2022-09-26 RX ADMIN — INSULIN ASPART 8 UNITS: 100 INJECTION, SOLUTION INTRAVENOUS; SUBCUTANEOUS at 04:09

## 2022-09-26 RX ADMIN — LIDOCAINE 1 PATCH: 50 PATCH CUTANEOUS at 11:09

## 2022-09-26 RX ADMIN — INSULIN ASPART 4 UNITS: 100 INJECTION, SOLUTION INTRAVENOUS; SUBCUTANEOUS at 08:09

## 2022-09-26 RX ADMIN — ATENOLOL 50 MG: 25 TABLET ORAL at 08:09

## 2022-09-26 RX ADMIN — Medication 1 PATCH: at 08:09

## 2022-09-26 RX ADMIN — DEXAMETHASONE SODIUM PHOSPHATE 4 MG: 4 INJECTION INTRA-ARTICULAR; INTRALESIONAL; INTRAMUSCULAR; INTRAVENOUS; SOFT TISSUE at 11:09

## 2022-09-26 RX ADMIN — INSULIN ASPART 5 UNITS: 100 INJECTION, SOLUTION INTRAVENOUS; SUBCUTANEOUS at 08:09

## 2022-09-26 RX ADMIN — FUROSEMIDE 20 MG: 20 TABLET ORAL at 08:09

## 2022-09-26 RX ADMIN — IOHEXOL 100 ML: 350 INJECTION, SOLUTION INTRAVENOUS at 12:09

## 2022-09-26 RX ADMIN — ASPIRIN 81 MG: 81 TABLET, COATED ORAL at 08:09

## 2022-09-26 RX ADMIN — SENNOSIDES AND DOCUSATE SODIUM 1 TABLET: 50; 8.6 TABLET ORAL at 08:09

## 2022-09-26 RX ADMIN — OLANZAPINE 5 MG: 2.5 TABLET, FILM COATED ORAL at 09:09

## 2022-09-26 RX ADMIN — POLYETHYLENE GLYCOL 3350 17 G: 17 POWDER, FOR SOLUTION ORAL at 08:09

## 2022-09-26 NOTE — ASSESSMENT & PLAN NOTE
77-year-old male with extensive past medical history presenting from [facility] for Neurosurgery evaluation of a brain mass found on CT head.  Per patient wife, patient has had sudden memory problems in the last 2 weeks with recurrent falls, but without loss of consciousness, seizure activity, head trauma.  Baseline is normal without other limitations.  Patient also has been describing 1 year of headache.  Has seen neurology outpatient, and has been taking gabapentin and undergoing nerve block procedures in neck with little effect. 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     Concerns for metastatic disease, possibly lung as primary given extensive smoking history.    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  - CTH with contrast 9/20: interventricular diffuse enhancing mass in lateral vents and 3rd vent  - CT chest/abdo/pelvis 9/21: 1.1 cm solid solitary RUL nodule, additional micronodule, hepatosplenomegaly, nonsepcific lucent lesions in L ilum, L3 and L4    Patient's pacemaker leads are NOT compatible with MRI    Plan:   -- NSGY evaluated, appreciate recs (pending recs in regards to surgical intervention)   --Dex 4mg q6  --PUD PPx while steroid treatment ongoing, patient on home pantoprazole 40mg qd  --Continue to monitor clinically, notify NSGY immediately with any changes in neuro status  -- ID consulted to r/o infectious etiology of mass   - f/u toxoplasma IgG and IgM, immunodeficiency panel, serum cryptococcus antibody    - LP to perform at bedside with HM or anesthesia; if unsuccessful, will notify IR for fluoro guidance    - f/u CSF labs: cell count with diff, protein, glucose, gram stain, culture, cytology, and flow cytometry   - pulmonary nodule bx can be done outpatient with studies (pathology, cytology, aerobic/anaerobic/fungal/AFB cultures)  -- zyprexa 10mg nightly for  agitation, 5mg BID PRN for breakthrough  -- no immediate surgical intervention planned  -- Neuro check q4h  -- Fall, Aspiration, Delirium precautions

## 2022-09-26 NOTE — SUBJECTIVE & OBJECTIVE
Interval History: Agitated overnight but mentation improved this morning. Will attempt bedside LP with either HM or anesthesia. If unsuccessful, will ask IR for fluoro guidance LP. Pending NSGY final recs     Review of Systems   Constitutional:  Negative for chills and fever.   HENT:  Negative for rhinorrhea, sneezing, sore throat and trouble swallowing.    Respiratory:  Negative for cough and shortness of breath.    Cardiovascular:  Negative for chest pain and palpitations.   Gastrointestinal:  Negative for constipation, diarrhea, nausea and vomiting.   Genitourinary:  Negative for dysuria, frequency and urgency.   Musculoskeletal:  Positive for arthralgias and back pain. Negative for myalgias.   Skin:  Negative for rash and wound.   Neurological:  Negative for syncope, facial asymmetry, speech difficulty, weakness, light-headedness and headaches.   Psychiatric/Behavioral:  Positive for agitation, confusion and decreased concentration.    Objective:     Vital Signs (Most Recent):  Temp: 96.8 °F (36 °C) (09/26/22 1130)  Pulse: 71 (09/26/22 1130)  Resp: 18 (09/26/22 1130)  BP: 125/65 (09/26/22 1130)  SpO2: 98 % (09/26/22 1130) Vital Signs (24h Range):  Temp:  [96.1 °F (35.6 °C)-98.6 °F (37 °C)] 96.8 °F (36 °C)  Pulse:  [71-78] 71  Resp:  [14-18] 18  SpO2:  [91 %-98 %] 98 %  BP: (122-148)/(59-71) 125/65     Weight: 86.2 kg (190 lb)  Body mass index is 25.77 kg/m².    Intake/Output Summary (Last 24 hours) at 9/26/2022 1342  Last data filed at 9/25/2022 1800  Gross per 24 hour   Intake 240 ml   Output --   Net 240 ml      Physical Exam  Vitals and nursing note reviewed.   Constitutional:       General: He is not in acute distress.     Appearance: He is obese. He is not ill-appearing or diaphoretic.   HENT:      Right Ear: External ear normal.      Left Ear: External ear normal.      Nose: Nose normal.   Eyes:      General:         Right eye: No discharge.         Left eye: No discharge.      Conjunctiva/sclera:  Conjunctivae normal.   Cardiovascular:      Rate and Rhythm: Normal rate and regular rhythm.      Heart sounds: Normal heart sounds. No murmur heard.  Pulmonary:      Effort: Pulmonary effort is normal. No respiratory distress.      Breath sounds: Normal breath sounds. No wheezing or rales.   Abdominal:      General: Abdomen is flat. There is no distension.      Palpations: There is no mass.      Tenderness: There is no abdominal tenderness.   Musculoskeletal:         General: No swelling, tenderness or deformity. Normal range of motion.      Cervical back: Normal range of motion and neck supple.      Right lower leg: No edema.      Left lower leg: No edema.   Skin:     General: Skin is warm.      Coloration: Skin is not jaundiced.      Findings: No bruising.   Neurological:      Mental Status: He is alert.      Cranial Nerves: No cranial nerve deficit.      Motor: Weakness (left sided, LUE weak 2/2 h/o Polio) present.      Comments: Oriented to person and year but not to place  Poor memory recall       Significant Labs: All pertinent labs within the past 24 hours have been reviewed.    Significant Imaging: I have reviewed all pertinent imaging results/findings within the past 24 hours.

## 2022-09-26 NOTE — PLAN OF CARE
CM requested past CT report and images from both 7/25/2017 and 9/20/22 head scans from [facility].     Updated Dr. Llanos on information received. Emailed to [facility] to upload into Epic.      Zahira Oviedo RN  Weekend  - [facility]  [id number]

## 2022-09-26 NOTE — PROGRESS NOTES
"[facility] - Neurosurgery ([facility])  [facility]  Progress Note    Patient Name: Kieran Styles  MRN: [id number]  Patient Class: IP- Inpatient   Admission Date: 9/20/2022  Length of Stay: 6 days  Attending Physician: Edy Fried MD  Primary Care Provider: Sony Holt DO        Subjective:     Principal Problem:Brain mass        HPI:  76 yo PMHx DM2, CAD s/p CABG x 2 1997, history of adenomatous colon polyps (07/2017) revealing 10 mm sessile serrated adenoma, GERD, multiple pacemaker replacements most recent 2021 for AF (on warfarin), HTN, HLD, chronic back pain, MARQUIS on CPAP [place] [facility] [place] [facility] due to a 2 week history of sudden memory deficits, disorientation, confsuion (date, situation, short-term memory), and multiple falls w/o head trauma or LOC or seizure. Wife noticed that he was forgetting where he was, what he had been doing. In addition, he has been getting weaker and more unstable on his feet. Yesterday evening, he fell while going to the bathroom and hit his elbow. Transported to EMS to [facility], where they did a CT head 09/20 this AM. Was discharged from [facility] because they initially told the wife that the CT scan was "clear", wife brought him to PCP and cardiologist OP office later that morning, but was called back by [facility] stating that they had mis-read the CT head and that the CT head was remarkable for apparent mass in brain.  Patient was subsequently transferred via EMS to [facility].  [facility] staff informed the wife that they were concerned for possible cancer, and wanted him to see neurosurgery evaluation.  Patient wife denies history cancer to her knowledge.  Patient is an active smoker smoking 1-2 packs per day, and has smoked for the last 30 years.  Social alcohol use, no other illicit substances.    On my initial evaluation, patient is conversational; however, he is only oriented to " self and time.  He exhibits short-term memory loss, and is not sure why he is here.  Overall, patient is a poor historian.  Family was not at bedside.  This history of present illness was obtained via telephone conversation with his wife ([phone] Donna Styles).  ROS includes denies chest pain, abdominal pain, nausea, vomiting, shortness of breath, cough, diarrhea, constipation, blood per rectum, fevers, chills, muscle aches, new weakness, new sensory changes.  Patient reports history of chronic lower back pain.    ED course:  Neurosurgery evaluated at [facility], concerns for metastasis.  No plans for surgical intervention at this time.  Subsequently pan CT scan pending.    PMHx/PSHx:  DM2, CAD s/p CABG x 2 , multiple pacemaker replacements most recent  for AF (on warfarin), HTN, HLD, chronic back pain, MARQUIS on CPAP    FMHx: Mother with breast cancer, . Sister NAFLD w/ cirrhosis, .      Overview/Hospital Course:  Patient presenting with sudden memory deficits, AMS, and multiple falls who was transferred from [facility] to [facility] for NSGY evaluation of the CT head () finding of a brain mass concerning for cancer. No plans for surgical intervention at this time. CT A/P with con demonstrated a pulmonary nodule, bony lesions in left ilium, L3 and L4 vertebral bodies concerning for metastasis. Patient's pacemaker is compatible with MRI; however, the leads are not therefore patient cannot undergo MRI. NSGY palnning for CT head on  to assess for interval change. ID consulted and believes brain mass with pulmonary nodule is likely 2/2 malignancy given h/o significant smoking and recommended IR bx of pulmonary nodule in addition to further infectious w/u. LP to be attemtped at bedside, if unsuccesful will re-discuss with IR for LP under fluoro. IR recommended pulmonary nodule bx outpatient.        Interval History: Agitated overnight but mentation improved this morning. Will attempt  bedside LP with either HM or anesthesia. If unsuccessful, will ask IR for fluoro guidance LP. Pending NSGY final recs     Review of Systems   Constitutional:  Negative for chills and fever.   HENT:  Negative for rhinorrhea, sneezing, sore throat and trouble swallowing.    Respiratory:  Negative for cough and shortness of breath.    Cardiovascular:  Negative for chest pain and palpitations.   Gastrointestinal:  Negative for constipation, diarrhea, nausea and vomiting.   Genitourinary:  Negative for dysuria, frequency and urgency.   Musculoskeletal:  Positive for arthralgias and back pain. Negative for myalgias.   Skin:  Negative for rash and wound.   Neurological:  Negative for syncope, facial asymmetry, speech difficulty, weakness, light-headedness and headaches.   Psychiatric/Behavioral:  Positive for agitation, confusion and decreased concentration.    Objective:     Vital Signs (Most Recent):  Temp: 96.8 °F (36 °C) (09/26/22 1130)  Pulse: 71 (09/26/22 1130)  Resp: 18 (09/26/22 1130)  BP: 125/65 (09/26/22 1130)  SpO2: 98 % (09/26/22 1130) Vital Signs (24h Range):  Temp:  [96.1 °F (35.6 °C)-98.6 °F (37 °C)] 96.8 °F (36 °C)  Pulse:  [71-78] 71  Resp:  [14-18] 18  SpO2:  [91 %-98 %] 98 %  BP: (122-148)/(59-71) 125/65     Weight: 86.2 kg (190 lb)  Body mass index is 25.77 kg/m².    Intake/Output Summary (Last 24 hours) at 9/26/2022 1342  Last data filed at 9/25/2022 1800  Gross per 24 hour   Intake 240 ml   Output --   Net 240 ml      Physical Exam  Vitals and nursing note reviewed.   Constitutional:       General: He is not in acute distress.     Appearance: He is obese. He is not ill-appearing or diaphoretic.   HENT:      Right Ear: External ear normal.      Left Ear: External ear normal.      Nose: Nose normal.   Eyes:      General:         Right eye: No discharge.         Left eye: No discharge.      Conjunctiva/sclera: Conjunctivae normal.   Cardiovascular:      Rate and Rhythm: Normal rate and regular rhythm.       Heart sounds: Normal heart sounds. No murmur heard.  Pulmonary:      Effort: Pulmonary effort is normal. No respiratory distress.      Breath sounds: Normal breath sounds. No wheezing or rales.   Abdominal:      General: Abdomen is flat. There is no distension.      Palpations: There is no mass.      Tenderness: There is no abdominal tenderness.   Musculoskeletal:         General: No swelling, tenderness or deformity. Normal range of motion.      Cervical back: Normal range of motion and neck supple.      Right lower leg: No edema.      Left lower leg: No edema.   Skin:     General: Skin is warm.      Coloration: Skin is not jaundiced.      Findings: No bruising.   Neurological:      Mental Status: He is alert.      Cranial Nerves: No cranial nerve deficit.      Motor: Weakness (left sided, LUE weak 2/2 h/o Polio) present.      Comments: Oriented to person and year but not to place  Poor memory recall       Significant Labs: All pertinent labs within the past 24 hours have been reviewed.    Significant Imaging: I have reviewed all pertinent imaging results/findings within the past 24 hours.      Assessment/Plan:      * Brain mass  77-year-old male with extensive past medical history presenting from [facility] for Neurosurgery evaluation of a brain mass found on CT head.  Per patient wife, patient has had sudden memory problems in the last 2 weeks with recurrent falls, but without loss of consciousness, seizure activity, head trauma.  Baseline is normal without other limitations.  Patient also has been describing 1 year of headache.  Has seen neurology outpatient, and has been taking gabapentin and undergoing nerve block procedures in neck with little effect. 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     Concerns for metastatic disease, possibly lung as primary given extensive smoking history.    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 deep parietal brain met  - CTH with contrast 9/20: interventricular diffuse enhancing mass in lateral vents and 3rd vent  - CT chest/abdo/pelvis 9/21: 1.1 cm solid solitary RUL nodule, additional micronodule, hepatosplenomegaly, nonsepcific lucent lesions in L ilum, L3 and L4    Patient's pacemaker leads are NOT compatible with MRI    Plan:   -- NSGY evaluated, appreciate recs (pending recs in regards to surgical intervention)   --Dex 4mg q6  --PUD PPx while steroid treatment ongoing, patient on home pantoprazole 40mg qd  --Continue to monitor clinically, notify NSGY immediately with any changes in neuro status  -- ID consulted to r/o infectious etiology of mass   - f/u toxoplasma IgG and IgM, immunodeficiency panel, serum cryptococcus antibody    - LP to perform at bedside with HM or anesthesia; if unsuccessful, will notify IR for fluoro guidance    - f/u CSF labs: cell count with diff, protein, glucose, gram stain, culture, cytology, and flow cytometry   - pulmonary nodule bx can be done outpatient with studies (pathology, cytology, aerobic/anaerobic/fungal/AFB cultures)  -- zyprexa 10mg nightly for agitation, 5mg BID PRN for breakthrough  -- no immediate surgical intervention planned  -- Neuro check q4h  -- Fall, Aspiration, Delirium precautions    Dermatitis associated with moisture  Wound care consulted, appreciate recs      COPD (chronic obstructive pulmonary disease)  On Anoro. Rx of singular but not taking.    -- Continue formulary equivalent of inhaler  -- PRN duoneb  -- SpO2 goal 88-92       Restless leg  -- Continue home ropinirole       GERD (gastroesophageal reflux disease)  -- Continue home PPI      Chronic back pain  -- MM pain regimen PRN   Tylenol PRN   Home Narco PRN   Lidocaine patches      Hypertension  Home medications: atenolol 50 qd, losartan-HCTZ 100-25 mg qd    -- Continue home medications as tolerated      MARQUIS on CPAP  -- Maintain nightly CPAP  -- Patient wife to  bring home CPAP      Pacemaker  Pacemaker is compatible with MRI but the leads are NOT compatible with MRI      Atrial fibrillation  Chronic anticoagulation use w/ warfarin  S/P pacemaker with multiple replacements    Home meds: Warfarin 7.5 qd, atenolol 50 qd  [id number]      -- Discontinued home warfarin in the case of NSGY intervention  -- Lovenox bridge while off warfarin, held for LP on 9/26 - will restart when appropriate   -- h/o stroke while off AC for a past colonoscopy  -- Continue home ASA, statin, and atenolol  -- Daily INR   -- Cardiac telemetry  -- Maintain K > 4, Mg > 2, Ca wnl; replete PRN  -- STAT cardiology consult if hemodynamic instability for DCCV evaluation        Hyperlipidemia  - See CAD involving coronary bypass graft      Coronary artery disease involving coronary bypass graft  CABG x 2 1997  Dyslipidemia    -- Continue ASA 81  -- Continue HI statin    Type 2 diabetes mellitus, without long-term current use of insulin  Patient's FSGs are controlled on current medication regimen.  Last A1c reviewed-   Lab Results   Component Value Date    HGBA1C 6.7 (H) 09/20/2022     Most recent fingerstick glucose reviewed-   Recent Labs   Lab 09/25/22  1609 09/25/22  2025 09/26/22  0732 09/26/22  1129   POCTGLUCOSE 315* 323* 250* 300*     Current correctional scale  Medium  Maintain anti-hyperglycemic dose as follows-   Antihyperglycemics (From admission, onward)    Start     Stop Route Frequency Ordered    09/26/22 0815  insulin aspart U-100 pen 5 Units         -- SubQ 3 times daily with meals 09/26/22 0702    09/25/22 2100  insulin detemir U-100 pen 7 Units         -- SubQ Nightly 09/25/22 1056    09/20/22 2250  insulin aspart U-100 pen 1-10 Units         -- SubQ Before meals & nightly PRN 09/20/22 2152      Home medications: metformin, pioglitazone, Victoza    -- Hold oral hyperglycemics  -- SSI, POCT BG qACHS; titrate basal/bolus regimen PRN to 140-180 goal        VTE Risk Mitigation (From  admission, onward)         Ordered     Reason for No Pharmacological VTE Prophylaxis  Once        Question:  Reasons:  Answer:  Already adequately anticoagulated on oral Anticoagulants    09/20/22 2152     IP VTE HIGH RISK PATIENT  Once         09/20/22 2152     Place sequential compression device  Until discontinued         09/20/22 2039                Discharge Planning   ANITA: 9/30/2022     Code Status: Full Code   Is the patient medically ready for discharge?: No    Reason for patient still in hospital (select all that apply): Patient trending condition, Laboratory test, Treatment, Imaging and Consult recommendations  Discharge Plan A: Rehab   Discharge Delays: None known at this time              Corinne Yoder DO  Department of Hospital Medicine   [facility] Neurosurgery ([facility])

## 2022-09-26 NOTE — PLAN OF CARE
09/26/22 1112   Post-Acute Status   Post-Acute Authorization Placement   Post-Acute Placement Status Referrals Sent   Discharge Delays None known at this time   Discharge Plan   Discharge Plan A Rehab   Discharge Plan B Skilled Nursing Facility     SW noted therapy recs for IPR and sent referrals via [facility]port to 4 facilities closest to pt's home. SW placed PMR consult.    MALACHI met with pt/wife at bedside to discuss plans for discharge and provided a list of IPR facilities. Pt/wife reported that their preference is 1) [facility] 2) [facility]    SW will continue to coordinate with patient, family, team and insurance to complete patient's discharge plan.    Cassi Randall LMSW  [phone]

## 2022-09-26 NOTE — PT/OT/SLP EVAL
"Speech Language Pathology Evaluation  Cognitive Communication  Discharge Summary    Patient Name:  Kieran Styles   MRN:  [id number]  Admitting Diagnosis: Brain mass    Recommendations:     Recommendations:                General Recommendations:  Follow-up not indicated within the acute care setting.   Diet recommendations:  Regular, Thin   Aspiration Precautions: No straws and Small bites/sips   General Precautions: Standard,    Communication strategies:  none    History:     Past Medical History:   Diagnosis Date    Coronary artery disease     Diabetes mellitus     GERD (gastroesophageal reflux disease)     Hypertension        Past Surgical History:   Procedure Laterality Date    APPENDECTOMY      CARDIAC SURGERY      JOINT REPLACEMENT         Subjective     Patient awake and alert. Spouse present.     "He's finally back to himself, he was living in a past life the last two days" - spouse report.     Pain/Comfort:   No c/o pain    Respiratory Status: Room air    Objective:   Cognitive Status:    Arousal/Alertness Appropriate response to stimuli  Attention No obvious deficits observed .  Perseveration Not present  Orientation Oriented x4  Memory Immediate Recall WFL, Delayed recall of 0/3 unassociated words after 1 min filled delay.  and recall general information WFL  Problem Solving Categories patient provided 15 items within concrete category indep. , Sequencing of 4 items completed with 100% acc. , Solutions to hypothetical situations compelted with 100% acc.  and Compare/contrast completed with 100% acc.   Safety awareness good  Managing finances WFL  Simple calculation WFL                 Receptive Language:   Comprehension:      Questions Complex yes/no WFL  Commands  multistep basic commands WFL     Expressive Language:  Verbal:    Naming Confrontation WFL and Single word responsive naming WFL  Conversational speech WFL                Motor Speech:  WFL     Voice:   WFL     Visual-Spatial:  WFL   "   Reading:   WFL      Written Expression:   WFL         Treatment: Patient tolerated thin liquids via cup sips x9 along with regular solids x4 with no overt signs of airway compromise.  Patient and spouse were provided skilled education re: diet recs, aspiration precautions, and discharge status from [facility]. Discussed outpatient speech therapy  Whiteboard updated.      Assessment:   Kieran Styles is a 77 y.o. male with an SLP diagnosis of  no oropharyngeal dysphagia .     Goals:   Multidisciplinary Problems       SLP Goals          Problem: SLP    Goal Priority Disciplines Outcome   SLP Goal     SLP Ongoing, Progressing   Description: Speech Language Pathology Goals  Goals expected to be met by 10/2    1. Patient will tolerate a regular diet and thin liquids with no overt signs of airway compromise.   2. Patient will participate in a speech language and cognitive evaluation when appropriate.                                  Plan:   Patient to be seen:  4 x/week   Plan of Care expires:     Plan of Care reviewed with:  spouse, patient   SLP Follow-Up:  Yes       Discharge recommendations:  Discharge Facility/Level of Care Needs: rehabilitation facility   Barriers to Discharge:  None    Time Tracking:   SLP Treatment Date:   09/26/22  Speech Start Time:  0818  Speech Stop Time:  0834     Speech Total Time (min):  16 min    Billable Minutes: Eval 8  and Treatment Swallowing Dysfunction 8    09/26/2022

## 2022-09-26 NOTE — ASSESSMENT & PLAN NOTE
Chronic anticoagulation use w/ warfarin  S/P pacemaker with multiple replacements    Home meds: Warfarin 7.5 qd, atenolol 50 qd  [id number] 8      -- Discontinued home warfarin in the case of NSGY intervention  -- Lovenox bridge while off warfarin, held for LP on 9/26 - will restart when appropriate   -- h/o stroke while off AC for a past colonoscopy  -- Continue home ASA, statin, and atenolol  -- Daily INR   -- Cardiac telemetry  -- Maintain K > 4, Mg > 2, Ca wnl; replete PRN  -- STAT cardiology consult if hemodynamic instability for DCCV evaluation

## 2022-09-26 NOTE — CONSULTS
Inpatient consult to Physical Medicine Rehab  Consult performed by: oLry Joy NP  Consult ordered by: Edy Fried MD  Reason for consult: Assess rehab needs    Reviewed patient history and current admission.  Rehab team following.  Full consult to follow.    MARIBELL Carson, FNP-C  Physical Medicine & Rehabilitation   09/26/2022

## 2022-09-26 NOTE — PLAN OF CARE
Problem: Adult Inpatient Plan of Care  Goal: Plan of Care Review  Outcome: Ongoing, Progressing  Goal: Patient-Specific Goal (Individualized)  Outcome: Ongoing, Progressing  Goal: Absence of Hospital-Acquired Illness or Injury  Outcome: Ongoing, Progressing  Intervention: Identify and Manage Fall Risk  Flowsheets (Taken 9/26/2022 1139)  Safety Promotion/Fall Prevention:   assistive device/personal item within reach   bed alarm set   Fall Risk reviewed with patient/family   Fall Risk signage in place   nonskid shoes/socks when out of bed   lighting adjusted   medications reviewed   side rails raised x 3  Intervention: Prevent Skin Injury  Flowsheets (Taken 9/26/2022 1139)  Body Position:   turned   weight shifting  Skin Protection:   adhesive use limited   incontinence pads utilized  Intervention: Prevent and Manage VTE (Venous Thromboembolism) Risk  Flowsheets (Taken 9/26/2022 1139)  Activity Management: Rolling - L1  VTE Prevention/Management: remove, assess skin, and reapply sequential compression device  Range of Motion: active ROM (range of motion) encouraged  Intervention: Prevent Infection  Flowsheets (Taken 9/26/2022 1139)  Infection Prevention:   hand hygiene promoted   single patient room provided   environmental surveillance performed  Goal: Optimal Comfort and Wellbeing  Outcome: Ongoing, Progressing  Intervention: Monitor Pain and Promote Comfort  Flowsheets (Taken 9/26/2022 1139)  Pain Management Interventions:   around-the-clock dosing utilized   care clustered   pillow support provided   position adjusted  Intervention: Provide Person-Centered Care  Flowsheets (Taken 9/26/2022 1139)  Trust Relationship/Rapport:   care explained   choices provided  Goal: Readiness for Transition of Care  Outcome: Ongoing, Progressing     Problem: Fall Injury Risk  Goal: Absence of Fall and Fall-Related Injury  Outcome: Ongoing, Progressing  Intervention: Identify and Manage Contributors  Flowsheets (Taken 9/26/2022  1139)  Self-Care Promotion: independence encouraged  Medication Review/Management: medications reviewed  Intervention: Promote Injury-Free Environment  Flowsheets (Taken 9/26/2022 1139)  Safety Promotion/Fall Prevention:   assistive device/personal item within reach   bed alarm set   Fall Risk reviewed with patient/family   Fall Risk signage in place   nonskid shoes/socks when out of bed   lighting adjusted   medications reviewed   side rails raised x 3     Problem: Diabetes Comorbidity  Goal: Blood Glucose Level Within Targeted Range  Outcome: Ongoing, Progressing  Intervention: Monitor and Manage Glycemia  Flowsheets (Taken 9/26/2022 1139)  Glycemic Management: blood glucose monitored     Problem: Skin Injury Risk Increased  Goal: Skin Health and Integrity  Outcome: Ongoing, Progressing  Intervention: Optimize Skin Protection  Flowsheets (Taken 9/26/2022 1139)  Pressure Reduction Techniques: frequent weight shift encouraged  Pressure Reduction Devices:   positioning supports utilized   pressure-redistributing mattress utilized  Skin Protection:   adhesive use limited   incontinence pads utilized  Head of Bed (HOB) Positioning: HOB elevated  Intervention: Promote and Optimize Oral Intake  Flowsheets (Taken 9/26/2022 1139)  Oral Nutrition Promotion: calorie-dense foods provided     Problem: Impaired Wound Healing  Goal: Optimal Wound Healing  Outcome: Ongoing, Progressing  Intervention: Promote Wound Healing  Flowsheets (Taken 9/26/2022 1139)  Oral Nutrition Promotion: calorie-dense foods provided  Sleep/Rest Enhancement: awakenings minimized  Activity Management: Rolling - L1  Pain Management Interventions:   around-the-clock dosing utilized   care clustered   pillow support provided   position adjusted     Problem: Pain Acute  Goal: Acceptable Pain Control and Functional Ability  Outcome: Ongoing, Progressing  Intervention: Develop Pain Management Plan  Flowsheets (Taken 9/26/2022 1139)  Pain Management  Interventions:   around-the-clock dosing utilized   care clustered   pillow support provided   position adjusted  Intervention: Prevent or Manage Pain  Flowsheets (Taken 9/26/2022 1138)  Sleep/Rest Enhancement: awakenings minimized  Sensory Stimulation Regulation: care clustered  Bowel Elimination Promotion: adequate fluid intake promoted  Medication Review/Management: medications reviewed

## 2022-09-26 NOTE — NURSING
Pt agitated, restless and pulling off tele leads.  Told the nurse that he want to get OOB and be left alone.  WCJONATHAN.

## 2022-09-26 NOTE — PT/OT/SLP PROGRESS
Occupational Therapy   Treatment    Name: Kieran Styles  MRN: [id number]  Admitting Diagnosis:  Brain mass       Recommendations:     Discharge Recommendations: rehabilitation facility  Discharge Equipment Recommendations:  other (see comments) (TBD)  Barriers to discharge:  None    Assessment:     Kieran Styles is a 77 y.o. male with a medical diagnosis of Brain mass.  He presents with the following performance deficits affecting function are weakness, impaired endurance, impaired self care skills, impaired functional mobility, gait instability, impaired balance, decreased upper extremity function, decreased lower extremity function, decreased safety awareness.     Pt agreeable to therapy. He demonstrated improved functional mobility with taking lateral side steps with Mod A x2 persons and bridging hips to assist with supine scooting. Pt required constant verbal cues for correcting severe posterior lean while sitting EOB and required a range of assistance levels from CGA-Max x2. Pt was able to participate in various ADLs while seated EOB and is demonstrating progress towards all OT goals.       Rehab Prognosis:  Good; patient would benefit from acute skilled OT services to address these deficits and reach maximum level of function.       Plan:     Patient to be seen 3 x/week to address the above listed problems via self-care/home management, therapeutic activities, therapeutic exercises, cognitive retraining, neuromuscular re-education  Plan of Care Expires: 10/22/22  Plan of Care Reviewed with: patient, spouse    Subjective     Pain/Comfort:  Pain Rating 1: 0/10    Objective:   *Co-treatment with PT for maximal pt participation, safety, and activity tolerance     Communicated with: RN prior to session.  Patient found supine with telemetry, bed alarm upon OT entry to room.    General Precautions: Standard, fall   Orthopedic Precautions:N/A   Braces: N/A  Respiratory Status: Room air     Occupational  Performance:     Bed Mobility:    Patient completed Rolling/Turning to Right with moderate assistance for UE and trunk management.   Patient completed Scooting/Bridging with moderate assistance x2 via draw sheet. Pt demonstrated increased ability to bridge B hips to assist.  Patient completed Supine to Sit with maximal assistance x2 persons for LE and trunk management.   Patient completed Sit to Supine with moderate assistance x2 person for LE and trunk management.     Functional Mobility/Transfers:  Patient completed Sit <> Stand Transfer with moderate assistance x2 person for decreased LE strength and decreased trunk control  with  no assistive device.  Functional Mobility: Pt demonstrated ability to take ~5 R lateral steps to reposition in bed. Pt needed constant verbal cues for initiation and execution of step with BLEs. Pt required mod A x2 person for safety and was able to tolerate ~3 minutes static standing.     Activities of Daily Living:  Grooming: stand by assistance for completing oral hygiene and face washing while seated EOB. Pt demonstrated poor static and dynamic sitting balance and required frequent verbal cues for correcting posterior lean.    Upper Body Dressing: minimum assistance for decreased trunk control while donning pull over shirt while seated EOB.  Lower Body Dressing: total assistance to don socks while sitting EOB. Maximal Assistance to don elastic waist band shorts while supine. Pt demonstrated ability to assist with pulling over B hips.       [facility] 6 Click ADL: 15    Treatment & Education:  Pt completed 1x5 functional reaches ~90* with BUE for increasing sitting balance and BUE strength. Pt demonstrated fair tolerance with exercise, but required frequent verbal cues for sequencing of movement.   Pt educated on the role of OT, POC, and d/c planning. Pt verbalized understanding and all questions were addressed within the scope of OT.   Pt educated on the importance of utilizing the call  bell to request for assistance with all functional mobility to ensure safety during hospital stay. Pt verbalized understanding.      Patient left HOB elevated with all lines intact, call button in reach, bed alarm on, and wife present    GOALS:   Multidisciplinary Problems       Occupational Therapy Goals          Problem: Occupational Therapy    Goal Priority Disciplines Outcome Interventions   Occupational Therapy Goal     OT, PT/OT Ongoing, Progressing    Description: Goals to be met by: 10/8/22     Patient will increase functional independence with ADLs by performing:    UE Dressing with Set-up Assistance.  Grooming while seated with Supervision.  Toileting from bedside commode with Set-up Assistance for hygiene and clothing management.                          Time Tracking:     OT Date of Treatment: 09/26/22  OT Start Time: 1328  OT Stop Time: 1358  OT Total Time (min): 30 min    Billable Minutes:Self Care/Home Management 30    OT/KIM: OT     KIM Visit Number: 0    9/26/2022

## 2022-09-26 NOTE — PLAN OF CARE
Problem: Diabetes Comorbidity  Goal: Blood Glucose Level Within Targeted Range  Intervention: Monitor and Manage Glycemia  Flowsheets (Taken 9/26/2022 0315)  Glycemic Management: blood glucose monitored     Problem: Adult Inpatient Plan of Care  Goal: Plan of Care Review  Outcome: Ongoing, Progressing     Problem: Fall Injury Risk  Goal: Absence of Fall and Fall-Related Injury  9/26/2022 0315 by Tammie Cevallos RN  Outcome: Ongoing, Progressing  9/26/2022 0315 by Tammie Cevallos RN  Outcome: Ongoing, Progressing  Intervention: Identify and Manage Contributors  Flowsheets (Taken 9/26/2022 0315)  Self-Care Promotion: safe use of adaptive equipment encouraged  Medication Review/Management: medications reviewed     Problem: Diabetes Comorbidity  Goal: Blood Glucose Level Within Targeted Range  9/26/2022 0315 by Tammie Cevallos RN  Outcome: Ongoing, Progressing  9/26/2022 0315 by Tammie Cevallos RN  Outcome: Ongoing, Progressing  Intervention: Monitor and Manage Glycemia  Flowsheets (Taken 9/26/2022 0315)  Glycemic Management: blood glucose monitored     Problem: Skin Injury Risk Increased  Goal: Skin Health and Integrity  Outcome: Ongoing, Progressing  Intervention: Promote and Optimize Oral Intake  Flowsheets (Taken 9/26/2022 0333)  Oral Nutrition Promotion:   medicated   nutritional therapy counseling provided     Problem: Pain Acute  Goal: Acceptable Pain Control and Functional Ability  Outcome: Ongoing, Progressing  Intervention: Prevent or Manage Pain  Flowsheets (Taken 9/26/2022 0315)  Sleep/Rest Enhancement: awakenings minimized  Sensory Stimulation Regulation: television on   POC reviewed with pt and spouse at the bedside.  Spouse verbalized understanding.  Pt needs reinforcement, confused.  Agitation, restlessness  and attempts to get OOB noted, meds administered to manage anxiety and agitation.  CT with contrast performed, pt was monitored and cooperative.  Telesitter at the bedside.  No falls.  VSS.  Blood  glucose monitored and covered.  Safety maintained.

## 2022-09-26 NOTE — PLAN OF CARE
Spoke to nurse, patient, and wife at bedside in preparation for bedside LP. No medical contraindications to the procedure. However, the patient's nurse for today states he has agitated episodes and would be concerned about us attempting this procedure at bedside. I spoke with the patient's wife as well, who agreed. He is not agitated at the moment, but states with his delirium that he gets agitated to the point of no control and has been doing so more frequently.     We will not be able to attempt this LP at bedside due to the need for sedation. Would recommend consulting IR for LP with sedation.    Connor M. Gillies, DO  PGY-3  Internal Medicine Consult Service

## 2022-09-26 NOTE — PLAN OF CARE
Problem: Physical Therapy  Goal: Physical Therapy Goal  Description: Goals to be met by: 10/8/2022     Patient will increase functional independence with mobility by performin. Supine to sit with MInimal Assistance  2. Sit to stand transfer with Minimal Assistance  3. Bed to chair transfer with Minimal Assistance using LRAD as needed  4. Gait  x 25 feet with Minimal Assistance using LRAD as needed.   5. Sitting at edge of bed x8 minutes with Stand-by Assistance while completing dynamic functional task  6. Lower extremity exercise program x15 reps, with supervision, in order to increase LE strength and (I) with functional mobility.   Outcome: Ongoing, Progressing   Goals remain appropriate 2022

## 2022-09-26 NOTE — ASSESSMENT & PLAN NOTE
Patient's FSGs are controlled on current medication regimen.  Last A1c reviewed-   Lab Results   Component Value Date    HGBA1C 6.7 (H) 09/20/2022     Most recent fingerstick glucose reviewed-   Recent Labs   Lab 09/25/22  1609 09/25/22  2025 09/26/22  0732 09/26/22  1129   POCTGLUCOSE 315* 323* 250* 300*     Current correctional scale  Medium  Maintain anti-hyperglycemic dose as follows-   Antihyperglycemics (From admission, onward)    Start     Stop Route Frequency Ordered    09/26/22 0815  insulin aspart U-100 pen 5 Units         -- SubQ 3 times daily with meals 09/26/22 0702    09/25/22 2100  insulin detemir U-100 pen 7 Units         -- SubQ Nightly 09/25/22 1056    09/20/22 2250  insulin aspart U-100 pen 1-10 Units         -- SubQ Before meals & nightly PRN 09/20/22 2152      Home medications: metformin, pioglitazone, Victoza    -- Hold oral hyperglycemics  -- SSI, POCT BG qACHS; titrate basal/bolus regimen PRN to 140-180 goal

## 2022-09-26 NOTE — PT/OT/SLP PROGRESS
Physical Therapy Co-Treatment with OT    Patient Name:  Kieran Styles   MRN:  [id number]    Recommendations:     Discharge Recommendations:  rehabilitation facility   Discharge Equipment Recommendations:  (tbd closer to discharge)   Barriers to discharge: Decreased caregiver support    Assessment:     Kieran Styles is a 77 y.o. male admitted with a medical diagnosis of Brain mass.  He presents with the following impairments/functional limitations:  weakness, impaired endurance, impaired self care skills, impaired functional mobility, gait instability, impaired balance, decreased safety awareness Pt tolerated treatment well today as he was able to gait train for 5 lateral steps to the right with mod A x 2 persons. Pt consistently was following commands through out session. Pt will continue to benefit from skilled PT 4x/ week to increase functional mobility. Pt when medically stable should discharge to rehab facility.    Rehab Prognosis: Good; patient would benefit from acute skilled PT services to address these deficits and reach maximum level of function.    Recent Surgery: * No surgery found *      Plan:     During this hospitalization, patient to be seen 4 x/week to address the identified rehab impairments via gait training, therapeutic activities, therapeutic exercises, neuromuscular re-education and progress toward the following goals:    Plan of Care Expires:  10/23/22    Subjective     Chief Complaint: wanting to get up and moving  Patient/Family Comments/goals: return to prior functional status  Pain/Comfort:  Pain Rating 1: 0/10  Pain Rating Post-Intervention 1: 0/10      Objective:     Communicated with nurse prior to session.  Patient found supine with telemetry, bed alarm (avasys) upon PT entry to room.     General Precautions: Standard, fall   Orthopedic Precautions:N/A   Braces:    Respiratory Status: Room air     Functional Mobility:  Bed Mobility:     Supine to Sit: maximal assistance and  of 2 persons  Sit to Supine: moderate assistance and of 2 persons    Transfers:     Sit to Stand:  moderate assistance and of 2 persons with no AD    Gait: Pt was able to gait train for 5 lateral steps to the right with mod A x 2 persons. Pt needed verbal cues to take larger steps.    Balance:   Pt was able to sit EOB for 15 minutes with max A- CGA while doing ADLs with OT. Pt had posterior and left lean. Pt needed verbal and tactile cues to sit in upright and midline position.  Pt was able to stand EOB for 3 minutes with min A.     Due to pt complex medical condition, the skill of 2 licensed therapists is needed to maximize treatment session and progression towards goals        AM-PAC 6 CLICK MOBILITY  Turning over in bed (including adjusting bedclothes, sheets and blankets)?: 2  Sitting down on and standing up from a chair with arms (e.g., wheelchair, bedside commode, etc.): 2  Moving from lying on back to sitting on the side of the bed?: 2  Moving to and from a bed to a chair (including a wheelchair)?: 2  Need to walk in hospital room?: 1  Climbing 3-5 steps with a railing?: 1  Basic Mobility Total Score: 10       Therapeutic Activities and Exercises:   In sitting EOB:  Hip Flexion strength: 5/5 bilaterally  Knee extension strength: 5/5      Pt and wife were verbally educated on PT POC. Both expressed verbal understanding.     Patient left with bed in chair position with all lines intact, call button in reach, and wife present..    GOALS:   Multidisciplinary Problems       Physical Therapy Goals          Problem: Physical Therapy    Goal Priority Disciplines Outcome Goal Variances Interventions   Physical Therapy Goal     PT, PT/OT Ongoing, Progressing     Description: Goals to be met by: 10/8/2022     Patient will increase functional independence with mobility by performin. Supine to sit with MInimal Assistance  2. Sit to stand transfer with Minimal Assistance  3. Bed to chair transfer with Minimal  Assistance using LRAD as needed  4. Gait  x 25 feet with Minimal Assistance using LRAD as needed.   5. Sitting at edge of bed x8 minutes with Stand-by Assistance while completing dynamic functional task  6. Lower extremity exercise program x15 reps, with supervision, in order to increase LE strength and (I) with functional mobility.                        Time Tracking:     PT Received On: 09/26/22  PT Start Time: 1328     PT Stop Time: 1358  PT Total Time (min): 30 min     Billable Minutes: Gait Training 15 min and Therapeutic Activity 15 min       PT/PTA: PT     PTA Visit Number: 0     09/26/2022

## 2022-09-27 ENCOUNTER — ANESTHESIA EVENT (OUTPATIENT)
Dept: ENDOSCOPY | Facility: HOSPITAL | Age: 77
DRG: 055 | End: 2022-09-27
Payer: MEDICARE

## 2022-09-27 PROBLEM — K59.00 CONSTIPATION: Status: ACTIVE | Noted: 2022-09-27

## 2022-09-27 LAB
ALBUMIN SERPL BCP-MCNC: 3.7 G/DL (ref 3.5–5.2)
ALP SERPL-CCNC: 53 U/L (ref 55–135)
ALT SERPL W/O P-5'-P-CCNC: 9 U/L (ref 10–44)
ANION GAP SERPL CALC-SCNC: 12 MMOL/L (ref 8–16)
AST SERPL-CCNC: 8 U/L (ref 10–40)
BASOPHILS # BLD AUTO: 0.02 K/UL (ref 0–0.2)
BASOPHILS NFR BLD: 0.2 % (ref 0–1.9)
BILIRUB SERPL-MCNC: 0.6 MG/DL (ref 0.1–1)
BUN SERPL-MCNC: 26 MG/DL (ref 8–23)
CALCIUM SERPL-MCNC: 9.4 MG/DL (ref 8.7–10.5)
CHLORIDE SERPL-SCNC: 104 MMOL/L (ref 95–110)
CO2 SERPL-SCNC: 24 MMOL/L (ref 23–29)
CREAT SERPL-MCNC: 0.9 MG/DL (ref 0.5–1.4)
DIFFERENTIAL METHOD: ABNORMAL
EOSINOPHIL # BLD AUTO: 0 K/UL (ref 0–0.5)
EOSINOPHIL NFR BLD: 0 % (ref 0–8)
ERYTHROCYTE [DISTWIDTH] IN BLOOD BY AUTOMATED COUNT: 16 % (ref 11.5–14.5)
EST. GFR  (NO RACE VARIABLE): >60 ML/MIN/1.73 M^2
GLUCOSE SERPL-MCNC: 295 MG/DL (ref 70–110)
HCT VFR BLD AUTO: 46.4 % (ref 40–54)
HGB BLD-MCNC: 14.9 G/DL (ref 14–18)
IMM GRANULOCYTES # BLD AUTO: 0.16 K/UL (ref 0–0.04)
IMM GRANULOCYTES NFR BLD AUTO: 1.3 % (ref 0–0.5)
INR PPP: 1.2 (ref 0.8–1.2)
LYMPHOCYTES # BLD AUTO: 0.9 K/UL (ref 1–4.8)
LYMPHOCYTES NFR BLD: 7.8 % (ref 18–48)
MAGNESIUM SERPL-MCNC: 2 MG/DL (ref 1.6–2.6)
MCH RBC QN AUTO: 29.8 PG (ref 27–31)
MCHC RBC AUTO-ENTMCNC: 32.1 G/DL (ref 32–36)
MCV RBC AUTO: 93 FL (ref 82–98)
MONOCYTES # BLD AUTO: 0.7 K/UL (ref 0.3–1)
MONOCYTES NFR BLD: 6 % (ref 4–15)
NEUTROPHILS # BLD AUTO: 10.1 K/UL (ref 1.8–7.7)
NEUTROPHILS NFR BLD: 84.7 % (ref 38–73)
NRBC BLD-RTO: 0 /100 WBC
PHOSPHATE SERPL-MCNC: 2.3 MG/DL (ref 2.7–4.5)
PLATELET # BLD AUTO: 209 K/UL (ref 150–450)
PMV BLD AUTO: 12.2 FL (ref 9.2–12.9)
POCT GLUCOSE: 206 MG/DL (ref 70–110)
POCT GLUCOSE: 243 MG/DL (ref 70–110)
POCT GLUCOSE: 271 MG/DL (ref 70–110)
POCT GLUCOSE: 272 MG/DL (ref 70–110)
POTASSIUM SERPL-SCNC: 4.2 MMOL/L (ref 3.5–5.1)
PROT SERPL-MCNC: 7.1 G/DL (ref 6–8.4)
PROTHROMBIN TIME: 12.1 SEC (ref 9–12.5)
RBC # BLD AUTO: 5 M/UL (ref 4.6–6.2)
SODIUM SERPL-SCNC: 140 MMOL/L (ref 136–145)
WBC # BLD AUTO: 11.87 K/UL (ref 3.9–12.7)

## 2022-09-27 PROCEDURE — 99233 PR SUBSEQUENT HOSPITAL CARE,LEVL III: ICD-10-PCS | Mod: GC,,, | Performed by: HOSPITALIST

## 2022-09-27 PROCEDURE — S4991 NICOTINE PATCH NONLEGEND: HCPCS

## 2022-09-27 PROCEDURE — 99900035 HC TECH TIME PER 15 MIN (STAT)

## 2022-09-27 PROCEDURE — 94660 CPAP INITIATION&MGMT: CPT

## 2022-09-27 PROCEDURE — 25000003 PHARM REV CODE 250

## 2022-09-27 PROCEDURE — 94640 AIRWAY INHALATION TREATMENT: CPT

## 2022-09-27 PROCEDURE — 36415 COLL VENOUS BLD VENIPUNCTURE: CPT

## 2022-09-27 PROCEDURE — 25000003 PHARM REV CODE 250: Performed by: EMERGENCY MEDICINE

## 2022-09-27 PROCEDURE — 85025 COMPLETE CBC W/AUTO DIFF WBC: CPT

## 2022-09-27 PROCEDURE — 83735 ASSAY OF MAGNESIUM: CPT

## 2022-09-27 PROCEDURE — 80053 COMPREHEN METABOLIC PANEL: CPT

## 2022-09-27 PROCEDURE — 85610 PROTHROMBIN TIME: CPT

## 2022-09-27 PROCEDURE — 99233 SBSQ HOSP IP/OBS HIGH 50: CPT | Mod: GC,,, | Performed by: HOSPITALIST

## 2022-09-27 PROCEDURE — 94761 N-INVAS EAR/PLS OXIMETRY MLT: CPT

## 2022-09-27 PROCEDURE — 99232 PR SUBSEQUENT HOSPITAL CARE,LEVL II: ICD-10-PCS | Mod: ,,, | Performed by: NEUROLOGICAL SURGERY

## 2022-09-27 PROCEDURE — 99232 SBSQ HOSP IP/OBS MODERATE 35: CPT | Mod: ,,, | Performed by: NEUROLOGICAL SURGERY

## 2022-09-27 PROCEDURE — 84100 ASSAY OF PHOSPHORUS: CPT

## 2022-09-27 PROCEDURE — 11000001 HC ACUTE MED/SURG PRIVATE ROOM

## 2022-09-27 PROCEDURE — 63600175 PHARM REV CODE 636 W HCPCS

## 2022-09-27 RX ORDER — AMOXICILLIN 250 MG
2 CAPSULE ORAL DAILY
Status: DISCONTINUED | OUTPATIENT
Start: 2022-09-28 | End: 2022-10-01 | Stop reason: HOSPADM

## 2022-09-27 RX ORDER — POLYETHYLENE GLYCOL 3350 17 G/17G
17 POWDER, FOR SOLUTION ORAL 2 TIMES DAILY
Status: DISCONTINUED | OUTPATIENT
Start: 2022-09-27 | End: 2022-10-01 | Stop reason: HOSPADM

## 2022-09-27 RX ORDER — INSULIN ASPART 100 [IU]/ML
7 INJECTION, SOLUTION INTRAVENOUS; SUBCUTANEOUS
Status: DISCONTINUED | OUTPATIENT
Start: 2022-09-27 | End: 2022-09-28

## 2022-09-27 RX ADMIN — OLANZAPINE 10 MG: 2.5 TABLET, FILM COATED ORAL at 08:09

## 2022-09-27 RX ADMIN — FLUTICASONE FUROATE AND VILANTEROL TRIFENATATE 1 PUFF: 100; 25 POWDER RESPIRATORY (INHALATION) at 08:09

## 2022-09-27 RX ADMIN — DEXAMETHASONE SODIUM PHOSPHATE 4 MG: 4 INJECTION INTRA-ARTICULAR; INTRALESIONAL; INTRAMUSCULAR; INTRAVENOUS; SOFT TISSUE at 12:09

## 2022-09-27 RX ADMIN — ATORVASTATIN CALCIUM 40 MG: 40 TABLET, FILM COATED ORAL at 08:09

## 2022-09-27 RX ADMIN — ROPINIROLE HYDROCHLORIDE 1 MG: 1 TABLET, FILM COATED ORAL at 08:09

## 2022-09-27 RX ADMIN — ATENOLOL 50 MG: 25 TABLET ORAL at 08:09

## 2022-09-27 RX ADMIN — SENNOSIDES AND DOCUSATE SODIUM 1 TABLET: 50; 8.6 TABLET ORAL at 08:09

## 2022-09-27 RX ADMIN — FUROSEMIDE 20 MG: 20 TABLET ORAL at 08:09

## 2022-09-27 RX ADMIN — INSULIN ASPART 3 UNITS: 100 INJECTION, SOLUTION INTRAVENOUS; SUBCUTANEOUS at 09:09

## 2022-09-27 RX ADMIN — POLYETHYLENE GLYCOL 3350 17 G: 17 POWDER, FOR SOLUTION ORAL at 09:09

## 2022-09-27 RX ADMIN — LOSARTAN POTASSIUM AND HYDROCHLOROTHIAZIDE 1 TABLET: 100; 25 TABLET, FILM COATED ORAL at 08:09

## 2022-09-27 RX ADMIN — HYDROXYZINE HYDROCHLORIDE 25 MG: 25 TABLET, FILM COATED ORAL at 08:09

## 2022-09-27 RX ADMIN — INSULIN ASPART 4 UNITS: 100 INJECTION, SOLUTION INTRAVENOUS; SUBCUTANEOUS at 05:09

## 2022-09-27 RX ADMIN — DEXAMETHASONE SODIUM PHOSPHATE 4 MG: 4 INJECTION INTRA-ARTICULAR; INTRALESIONAL; INTRAMUSCULAR; INTRAVENOUS; SOFT TISSUE at 05:09

## 2022-09-27 RX ADMIN — INSULIN ASPART 6 UNITS: 100 INJECTION, SOLUTION INTRAVENOUS; SUBCUTANEOUS at 08:09

## 2022-09-27 RX ADMIN — HYDROXYZINE HYDROCHLORIDE 25 MG: 25 TABLET, FILM COATED ORAL at 05:09

## 2022-09-27 RX ADMIN — INSULIN ASPART 4 UNITS: 100 INJECTION, SOLUTION INTRAVENOUS; SUBCUTANEOUS at 12:09

## 2022-09-27 RX ADMIN — MELATONIN TAB 3 MG 6 MG: 3 TAB at 08:09

## 2022-09-27 RX ADMIN — INSULIN ASPART 7 UNITS: 100 INJECTION, SOLUTION INTRAVENOUS; SUBCUTANEOUS at 05:09

## 2022-09-27 RX ADMIN — Medication 1 PATCH: at 08:09

## 2022-09-27 RX ADMIN — OLANZAPINE 5 MG: 2.5 TABLET, FILM COATED ORAL at 02:09

## 2022-09-27 RX ADMIN — PANTOPRAZOLE SODIUM 40 MG: 40 TABLET, DELAYED RELEASE ORAL at 08:09

## 2022-09-27 NOTE — ASSESSMENT & PLAN NOTE
- advanced bowel regiment to miralax and doc-senna to twice daily  - if not successful in causing bowel movement, will consider enema

## 2022-09-27 NOTE — ASSESSMENT & PLAN NOTE
Chronic anticoagulation use w/ warfarin  S/P pacemaker with multiple replacements    Home meds: Warfarin 7.5 qd, atenolol 50 qd  [id number]      -- Discontinued home warfarin in the case of NSGY intervention  -- Lovenox bridge while off warfarin, held for LP - will restart when appropriate   -- h/o stroke while off AC for a past colonoscopy  -- Continue home ASA, statin, and atenolol  -- Daily INR   -- Cardiac telemetry  -- Maintain K > 4, Mg > 2, Ca wnl; replete PRN  -- STAT cardiology consult if hemodynamic instability for DCCV evaluation

## 2022-09-27 NOTE — PROGRESS NOTES
"Charlie Bryan - Neurosurgery ([facility])  [facility] Medicine  Progress Note    Patient Name: Kieran Styles  MRN: [id number]  Patient Class: IP- Inpatient   Admission Date: 9/20/2022  Length of Stay: 7 days  Attending Physician: Edy Fried MD  Primary Care Provider: Sony Holt DO        Subjective:     Principal Problem:Brain mass        HPI:  78 yo PMHx DM2, CAD s/p CABG x 2 1997, history of adenomatous colon polyps (07/2017) revealing 10 mm sessile serrated adenoma, GERD, multiple pacemaker replacements most recent 2021 for AF (on warfarin), HTN, HLD, chronic back pain, MARQUIS on CPAP presenting to [facility] from [facility] due to a 2 week history of sudden memory deficits, disorientation, confsuion (date, situation, short-term memory), and multiple falls w/o head trauma or LOC or seizure. Wife noticed that he was forgetting where he was, what he had been doing. In addition, he has been getting weaker and more unstable on his feet. Yesterday evening, he fell while going to the bathroom and hit his elbow. Transported to EMS to [facility], where they did a CT head 09/20 this AM. Was discharged from [facility] because they initially told the wife that the CT scan was "clear", wife brought him to PCP and cardiologist OP office later that morning, but was called back by [facility] stating that they had mis-read the CT head and that the CT head was remarkable for apparent mass in brain.  Patient was subsequently transferred via EMS to [facility].  [facility] staff informed the wife that they were concerned for possible cancer, and wanted him to see neurosurgery evaluation.  Patient wife denies history cancer to her knowledge.  Patient is an active smoker smoking 1-2 packs per day, and has smoked for the last 30 years.  Social alcohol use, no other illicit substances.    On my initial evaluation, patient is conversational; however, he is only oriented to " self and time.  He exhibits short-term memory loss, and is not sure why he is here.  Overall, patient is a poor historian.  Family was not at bedside.  This history of present illness was obtained via telephone conversation with his wife ([phone] Donna Styles).  ROS includes denies chest pain, abdominal pain, nausea, vomiting, shortness of breath, cough, diarrhea, constipation, blood per rectum, fevers, chills, muscle aches, new weakness, new sensory changes.  Patient reports history of chronic lower back pain.    ED course:  Neurosurgery evaluated at [facility], concerns for metastasis.  No plans for surgical intervention at this time.  Subsequently pan CT scan pending.    PMHx/PSHx:  DM2, CAD s/p CABG x 2 , multiple pacemaker replacements most recent  for AF (on warfarin), HTN, HLD, chronic back pain, MARQUIS on CPAP    FMHx: Mother with breast cancer, . Sister NAFLD w/ cirrhosis, .      Overview/Hospital Course:  Patient presenting with sudden memory deficits, AMS, and multiple falls who was transferred from [facility] [place] [facility] [place]GY [place] of the CT head () finding of a brain mass concerning for cancer. No plans for surgical intervention at this time. CT A/P with con demonstrated a pulmonary nodule, bony lesions in left ilium, L3 and L4 vertebral bodies concerning for metastasis. Patient's pacemaker is compatible with MRI; however, the leads are not therefore patient cannot undergo MRI. NSGY palnning for CT head on  to assess for interval change. ID consulted and believes brain mass with pulmonary nodule is likely 2/2 malignancy given h/o significant smoking and recommended IR bx of pulmonary nodule in addition to further infectious w/u. Unsuccessful LP attempted at bedside, plan for LP with IR under fluoro on . IR recommended pulmonary nodule bx outpatient.        Interval History: Agitated overnight but mentation improved this morning. Will plan for fluoro  guided LP with IR today. Patient reports no bowel movement since admission. Pending NSGY final recs.    Review of Systems   Constitutional:  Negative for chills and fever.   HENT:  Negative for rhinorrhea, sneezing, sore throat and trouble swallowing.    Respiratory:  Negative for cough and shortness of breath.    Cardiovascular:  Negative for chest pain and palpitations.   Gastrointestinal:  Positive for constipation. Negative for diarrhea, nausea and vomiting.   Genitourinary:  Negative for dysuria, frequency and urgency.   Musculoskeletal:  Positive for arthralgias and back pain. Negative for myalgias.   Skin:  Negative for rash and wound.   Neurological:  Negative for syncope, facial asymmetry, speech difficulty, weakness, light-headedness and headaches.   Psychiatric/Behavioral:  Positive for agitation, confusion and decreased concentration.    Objective:     Vital Signs (Most Recent):  Temp: 96.3 °F (35.7 °C) (09/27/22 1147)  Pulse: 69 (09/27/22 1156)  Resp: 18 (09/27/22 1147)  BP: 137/68 (09/27/22 1147)  SpO2: (!) 94 % (09/27/22 1147) Vital Signs (24h Range):  Temp:  [96.1 °F (35.6 °C)-97.6 °F (36.4 °C)] 96.3 °F (35.7 °C)  Pulse:  [65-71] 69  Resp:  [17-18] 18  SpO2:  [91 %-97 %] 94 %  BP: (122-137)/(59-68) 137/68     Weight: 86.2 kg (190 lb)  Body mass index is 25.77 kg/m².    Intake/Output Summary (Last 24 hours) at 9/27/2022 1253  Last data filed at 9/27/2022 0900  Gross per 24 hour   Intake 600 ml   Output 250 ml   Net 350 ml        Physical Exam  Vitals and nursing note reviewed.   Constitutional:       General: He is not in acute distress.     Appearance: He is obese. He is not ill-appearing or diaphoretic.   HENT:      Right Ear: External ear normal.      Left Ear: External ear normal.      Nose: Nose normal.   Eyes:      General:         Right eye: No discharge.         Left eye: No discharge.      Conjunctiva/sclera: Conjunctivae normal.   Cardiovascular:      Rate and Rhythm: Normal rate and regular  rhythm.      Heart sounds: Normal heart sounds. No murmur heard.  Pulmonary:      Effort: Pulmonary effort is normal. No respiratory distress.      Breath sounds: Normal breath sounds. No wheezing or rales.   Abdominal:      General: Abdomen is flat. There is no distension.      Palpations: There is no mass.      Tenderness: There is no abdominal tenderness.   Musculoskeletal:         General: No swelling, tenderness or deformity. Normal range of motion.      Cervical back: Normal range of motion and neck supple.      Right lower leg: No edema.      Left lower leg: No edema.   Skin:     General: Skin is warm.      Coloration: Skin is not jaundiced.      Findings: No bruising.   Neurological:      Mental Status: He is alert.      Cranial Nerves: No cranial nerve deficit.      Motor: Weakness (left sided, LUE weak 2/2 h/o Polio) present.      Comments: Oriented to person and year but not to place  Poor memory recall       Significant Labs: All pertinent labs within the past 24 hours have been reviewed.    Significant Imaging: I have reviewed all pertinent imaging results/findings within the past 24 hours.      Assessment/Plan:      * Brain mass  77-year-old male with extensive past medical history presenting from [facility] for Neurosurgery evaluation of a brain mass found on CT head.  Per patient wife, patient has had sudden memory problems in the last 2 weeks with recurrent falls, but without loss of consciousness, seizure activity, head trauma.  Baseline is normal without other limitations.  Patient also has been describing 1 year of headache.  Has seen neurology outpatient, and has been taking gabapentin and undergoing nerve block procedures in neck with little effect. 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     Concerns for metastatic disease, possibly lung as primary given extensive smoking  history.    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     Patient's pacemaker leads are NOT compatible with MRI    Plan:   -- NSGY evaluated, appreciate recs (pending recs in regards to surgical intervention)   --Dex 4mg q6  --PUD PPx while steroid treatment ongoing, patient on home pantoprazole 40mg qd  --Continue to monitor clinically, notify NSGY immediately with any changes in neuro status  -- ID consulted to r/o infectious etiology of mass   - f/u toxoplasma IgG and IgM, immunodeficiency panel, serum cryptococcus antibody    - IR for fluoro guided LP on 9/27   - f/u CSF labs: cell count with diff, protein, glucose, gram stain, culture, cytology, and flow cytometry   - pulmonary nodule bx can be done outpatient with studies (pathology, cytology, aerobic/anaerobic/fungal/AFB cultures)  -- zyprexa 10mg nightly for agitation, 5mg BID PRN for breakthrough  -- no immediate surgical intervention planned  -- Neuro check q4h  -- Fall, Aspiration, Delirium precautions    Constipation  - advanced bowel regiment to miralax and doc-senna to twice daily  - if not successful in causing bowel movement, will consider enema      Dermatitis associated with moisture  Wound care consulted, appreciate recs      COPD (chronic obstructive pulmonary disease)  On Anoro. Rx of singular but not taking.    -- Continue formulary equivalent of inhaler  -- PRN duoneb  -- SpO2 goal 88-92       Restless leg  -- Continue home ropinirole       GERD (gastroesophageal reflux disease)  -- Continue home PPI      Chronic back pain  -- MM pain regimen PRN   Tylenol PRN   Home Narco PRN   Lidocaine  patches      Hypertension  Home medications: atenolol 50 qd, losartan-HCTZ 100-25 mg qd    -- Continue home medications as tolerated      MARQUIS on CPAP  -- Maintain nightly CPAP  -- Patient wife to bring home CPAP      Pacemaker  Pacemaker is compatible with MRI but the leads are NOT compatible with MRI      Atrial fibrillation  Chronic anticoagulation use w/ warfarin  S/P pacemaker with multiple replacements    Home meds: Warfarin 7.5 qd, atenolol 50 qd  [id number]      -- Discontinued home warfarin in the case of NSGY intervention  -- Lovenox bridge while off warfarin, held for LP - will restart when appropriate   -- h/o stroke while off AC for a past colonoscopy  -- Continue home ASA, statin, and atenolol  -- Daily INR   -- Cardiac telemetry  -- Maintain K > 4, Mg > 2, Ca wnl; replete PRN  -- STAT cardiology consult if hemodynamic instability for DCCV evaluation        Hyperlipidemia  - See CAD involving coronary bypass graft      Coronary artery disease involving coronary bypass graft  CABG x 2 1997  Dyslipidemia    -- Continue ASA 81  -- Continue HI statin    Type 2 diabetes mellitus, without long-term current use of insulin  Patient's FSGs are controlled on current medication regimen.  Last A1c reviewed-   Lab Results   Component Value Date    HGBA1C 6.7 (H) 09/20/2022     Most recent fingerstick glucose reviewed-   Recent Labs   Lab 09/26/22  1634 09/26/22  2142 09/27/22  0742 09/27/22  1147   POCTGLUCOSE 317* 264* 271* 243*     Current correctional scale  Medium  Maintain anti-hyperglycemic dose as follows-   Antihyperglycemics (From admission, onward)      Start     Stop Route Frequency Ordered    09/27/22 2100  insulin detemir U-100 pen 12 Units         -- SubQ Nightly 09/27/22 1144    09/27/22 1645  insulin aspart U-100 pen 7 Units         -- SubQ 3 times daily with meals 09/27/22 1144    09/20/22 2250  insulin aspart U-100 pen 1-10 Units         -- SubQ Before meals & nightly PRN 09/20/22 2152         Home medications: metformin, pioglitazone, Victoza    -- Hold oral hyperglycemics  -- SSI, POCT BG qACHS; titrate basal/bolus regimen PRN to 140-180 goal      VTE Risk Mitigation (From admission, onward)           Ordered     Reason for No Pharmacological VTE Prophylaxis  Once        Question:  Reasons:  Answer:  Already adequately anticoagulated on oral Anticoagulants    09/20/22 2152     IP VTE HIGH RISK PATIENT  Once         09/20/22 2152     Place sequential compression device  Until discontinued         09/20/22 2039                    Discharge Planning   ANITA: 9/30/2022     Code Status: Full Code   Is the patient medically ready for discharge?: No    Reason for patient still in hospital (select all that apply): Patient trending condition, Laboratory test, Treatment and Consult recommendations  Discharge Plan A: Rehab   Discharge Delays: None known at this time              Sarah Llanos MD  Department of Hospital Medicine   [facility] - Neurosurgery ([facility])

## 2022-09-27 NOTE — PROGRESS NOTES
Charlie Bryan - Neurosurgery ([facility])  Neurosurgery  Progress Note    Subjective:     History of Present Illness: Patient is a 77M with PMH of HTN, DM, polio, CAD (on ASA/Coumadin) who presents with multiple falls (including this morning). Patient was transferred from OSH for NSGY for possible brain mass.    Patient has a pacemaker so it is uncertain whether or not he can get an MRI. He is originally from [facility] and was transferred here for care. Patient denies head trauma with his fall this morning. He notes that he has started using a walker in the past few weeks. Patient complains of L leg pain, likely related to his fall. 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      Post-Op Info:  * No surgery found *         Interval History: Alert and oriented x2 this am. AFVSS.  Pending LP today.    Medications:  Continuous Infusions:  Scheduled Meds:   aspirin  81 mg Oral Daily    atenoloL  50 mg Oral Daily    atorvastatin  40 mg Oral QHS    dexamethasone  4 mg Intravenous Q6H    fluticasone furoate-vilanteroL  1 puff Inhalation Daily    furosemide  20 mg Oral Daily    insulin aspart U-100  5 Units Subcutaneous TIDWM    insulin detemir U-100  9 Units Subcutaneous QHS    LIDOcaine  1 patch Transdermal Q24H    losartan-hydrochlorothiazide 100-25 mg  1 tablet Oral Daily    nicotine  1 patch Transdermal Daily    OLANZapine  10 mg Oral QHS    pantoprazole  40 mg Oral Daily    polyethylene glycol  17 g Oral BID    rOPINIRole  1 mg Oral QHS    [START ON 9/28/2022] senna-docusate 8.6-50 mg  2 tablet Oral Daily     PRN Meds:acetaminophen, albuterol-ipratropium, aluminum-magnesium hydroxide-simethicone, dextrose 10%, dextrose 10%, glucagon (human recombinant), glucose, glucose, HYDROcodone-acetaminophen, hydrOXYzine HCL, insulin aspart U-100, melatonin, naloxone, OLANZapine, sodium chloride 0.9%, sodium chloride 0.9%     Review of Systems  Objective:     Weight: 86.2 kg (190  lb)  Body mass index is 25.77 kg/m².  Vital Signs (Most Recent):  Temp: 97.5 °F (36.4 °C) (09/27/22 0751)  Pulse: 70 (09/27/22 0816)  Resp: 18 (09/27/22 0816)  BP: (!) 122/59 (09/27/22 0832)  SpO2: 95 % (09/27/22 0816) Vital Signs (24h Range):  Temp:  [96.1 °F (35.6 °C)-97.6 °F (36.4 °C)] 97.5 °F (36.4 °C)  Pulse:  [65-71] 70  Resp:  [17-18] 18  SpO2:  [91 %-98 %] 95 %  BP: (122-136)/(59-65) 122/59     Date 09/27/22 0700 - 09/28/22 0659   Shift 2880-8433 5251-1921 6018-3440 24 Hour Total   INTAKE   P.O. 240   240   Shift Total(mL/kg) 240(2.8)   240(2.8)   OUTPUT   Shift Total(mL/kg)       Weight (kg) 86.2 86.2 86.2 86.2                          Physical Exam    Neurosurgery Physical Exam  General: AOx2-3, GCS E4V4M6, higher order confusion  CNII-XII: Intact on fine exam, PERRL, visual fields grossly intact, EOMI, facial sensation preserved, no facial asymmetry, tongue midline, shoulder shrug equal, no pronator drift  Extremities: LUE contracted at baseline (4/5 motor), 5/5 motor otherwise, sensorium intact throughout, coordination intact throughout, DTRs 2+, no pathological reflexes, no sensory level present    Significant Labs:  Recent Labs   Lab 09/26/22 0905 09/27/22 0618   * 295*    140   K 4.4 4.2    104   CO2 22* 24   BUN 33* 26*   CREATININE 1.1 0.9   CALCIUM 9.4 9.4   MG 2.0 2.0       Recent Labs   Lab 09/26/22 0905 09/27/22 0618   WBC 12.27 11.87   HGB 14.4 14.9   HCT 45.9 46.4    209       Recent Labs   Lab 09/26/22 0905 09/27/22 0618   INR 1.2 1.2       Microbiology Results (last 7 days)       Procedure Component Value Units Date/Time    Blood culture [[id number]] Collected: 09/23/22 1546    Order Status: Completed Specimen: Blood from Peripheral, Left Arm Updated: 09/26/22 1812     Blood Culture, Routine No Growth to date      No Growth to date      No Growth to date      No Growth to date    Blood culture [[id number]] Collected: 09/23/22 1546    Order Status: Completed  Specimen: Blood from Peripheral, Right Arm Updated: 09/26/22 1812     Blood Culture, Routine No Growth to date      No Growth to date      No Growth to date      No Growth to date    Gram stain [[id number]]     Order Status: No result Specimen: CSF (Spinal Fluid) from CSF Tap, Tube 3     CSF culture [[id number]]     Order Status: No result Specimen: CSF (Spinal Fluid) from CSF Tap, Tube 3           All pertinent labs from the last 24 hours have been reviewed.    Significant Diagnostics:  I have reviewed all pertinent imaging results/findings within the past 24 hours.    Assessment/Plan:     * Brain mass  Patient is a 77M with PMH of HTN, DM, polio, CAD (on ASA/Coumadin with pacemaker) who presents with multiple falls (including this morning). Patient was transferred from OSH for NSGY for possible brain mass.    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   --CTH with contrast 9/20: interventricular diffuse enhancing mass in lateral vents and 3rd vent   --CT chest/abdo/pelvis 9/21: 1.1 cm solid solitary RUL nodule, additional micronodule, hepatosplenomegaly, nonsepcific lucent lesions in L ilum, L3 and L4   --Cannot do MRI - pacemaker MRI compatible, but leads not   --Repeat CTH w wo con 9/26 shows smaller lesion with less contrast enhancement  --F/u LP results, surgical plan pending  --SBP <160  --Na >135  --Dex 4q6  --PUD PPx while steroid treatment ongoing  --Continue to monitor clinically, notify NSGY immediately with any changes in neuro status        Bonita Fontaine MD  Neurosurgery  Charlie Bryan - Neurosurgery ([facility]

## 2022-09-27 NOTE — PLAN OF CARE
Problem: Adult Inpatient Plan of Care  Goal: Plan of Care Review  Outcome: Ongoing, Progressing  Goal: Patient-Specific Goal (Individualized)  Outcome: Ongoing, Progressing  Goal: Absence of Hospital-Acquired Illness or Injury  Outcome: Ongoing, Progressing  Goal: Optimal Comfort and Wellbeing  Outcome: Ongoing, Progressing  Goal: Readiness for Transition of Care  Outcome: Ongoing, Progressing     Problem: Fall Injury Risk  Goal: Absence of Fall and Fall-Related Injury  Outcome: Ongoing, Progressing     Problem: Diabetes Comorbidity  Goal: Blood Glucose Level Within Targeted Range  Outcome: Ongoing, Progressing     Problem: Skin Injury Risk Increased  Goal: Skin Health and Integrity  Outcome: Ongoing, Progressing     Problem: Impaired Wound Healing  Goal: Optimal Wound Healing  Outcome: Ongoing, Progressing     Problem: Pain Acute  Goal: Acceptable Pain Control and Functional Ability  Outcome: Ongoing, Progressing

## 2022-09-27 NOTE — ASSESSMENT & PLAN NOTE
Patient is a 77M with PMH of HTN, DM, polio, CAD (on ASA/Coumadin with pacemaker) who presents with multiple falls (including this morning). Patient was transferred from OSH for NSGY for possible brain mass.    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   --CTH with contrast 9/20: interventricular diffuse enhancing mass in lateral vents and 3rd vent   --CT chest/abdo/pelvis 9/21: 1.1 cm solid solitary RUL nodule, additional micronodule, hepatosplenomegaly, nonsepcific lucent lesions in L ilum, L3 and L4   --Cannot do MRI - pacemaker MRI compatible, but leads not   --Repeat CTH w wo con 9/26 shows smaller lesion with less contrast enhancement  --LP today, surgical planning pending  --SBP <160  --Na >135  --Dex 4q6  --PUD PPx while steroid treatment ongoing  --Continue to monitor clinically, notify NSGY immediately with any changes in neuro status

## 2022-09-27 NOTE — PLAN OF CARE
Following up on post-acute plan. Pt/family's choice for rehab is: 1)[facility], 2)[facility].      1330: Spoke to [facility] admissions.  She stated that she believes that they will be able to accept him for rehab, however she will not know if they have an available bed for him until after her meeting this afternoon.    Contact info for [facility] is: [facility]: [phone], [place] ([phone]), [place] [phone],  fax [phone] [facility] [place] [place] [address] (fax number for orders/nursing station: [phone])    Mari Hastings RN Case Manager  [facility]  09/27/2022

## 2022-09-27 NOTE — PLAN OF CARE
Called by primary team for bedside LP. When I evaluated patient at bedside, he was only oriented to self and place. He was slightly agitated and required frequent redirecting in order to get him to stay seated in bed. Unable to attempt bedside LP at this time due to patient not being able to follow commands. Primary team updated.     Wali Comer MD  Anesthesiology, [facility], [facility]

## 2022-09-27 NOTE — PROGRESS NOTES
09/27/22 0900   WOCN Assessment   WOCN Total Time (mins) 10   Visit Date 09/27/22   Visit Time 0900   Consult Type New   WOCN Speciality Wound   Intervention assessed;changed;applied;chart review;coordination of care;orders        Altered Skin Integrity 09/21/22 1000 Sacral spine   Date First Assessed/Time First Assessed: 09/21/22 1000   Location: Sacral spine   Wound Image    Drainage Amount Scant   Red (%), Wound Tissue Color 100 %   Wound Length (cm) 7.5 cm   Wound Width (cm) 4.5 cm   Wound Depth (cm) 0.1 cm   Wound Volume (cm^3) 3.375 cm^3   Wound Surface Area (cm^2) 33.75 cm^2   Patient consult for wound care to sacra; area. This site has progression noted, with a decrease in size and less inflammation in color. Continue with triad paste.

## 2022-09-27 NOTE — SUBJECTIVE & OBJECTIVE
Interval History: Agitated overnight but mentation improved this morning. Will plan for fluoro guided LP with IR today. Patient reports no bowel movement since admission. Pending NSGY final recs.    Review of Systems   Constitutional:  Negative for chills and fever.   HENT:  Negative for rhinorrhea, sneezing, sore throat and trouble swallowing.    Respiratory:  Negative for cough and shortness of breath.    Cardiovascular:  Negative for chest pain and palpitations.   Gastrointestinal:  Positive for constipation. Negative for diarrhea, nausea and vomiting.   Genitourinary:  Negative for dysuria, frequency and urgency.   Musculoskeletal:  Positive for arthralgias and back pain. Negative for myalgias.   Skin:  Negative for rash and wound.   Neurological:  Negative for syncope, facial asymmetry, speech difficulty, weakness, light-headedness and headaches.   Psychiatric/Behavioral:  Positive for agitation, confusion and decreased concentration.    Objective:     Vital Signs (Most Recent):  Temp: 96.3 °F (35.7 °C) (09/27/22 1147)  Pulse: 69 (09/27/22 1156)  Resp: 18 (09/27/22 1147)  BP: 137/68 (09/27/22 1147)  SpO2: (!) 94 % (09/27/22 1147) Vital Signs (24h Range):  Temp:  [96.1 °F (35.6 °C)-97.6 °F (36.4 °C)] 96.3 °F (35.7 °C)  Pulse:  [65-71] 69  Resp:  [17-18] 18  SpO2:  [91 %-97 %] 94 %  BP: (122-137)/(59-68) 137/68     Weight: 86.2 kg (190 lb)  Body mass index is 25.77 kg/m².    Intake/Output Summary (Last 24 hours) at 9/27/2022 1253  Last data filed at 9/27/2022 0900  Gross per 24 hour   Intake 600 ml   Output 250 ml   Net 350 ml        Physical Exam  Vitals and nursing note reviewed.   Constitutional:       General: He is not in acute distress.     Appearance: He is obese. He is not ill-appearing or diaphoretic.   HENT:      Right Ear: External ear normal.      Left Ear: External ear normal.      Nose: Nose normal.   Eyes:      General:         Right eye: No discharge.         Left eye: No discharge.       Conjunctiva/sclera: Conjunctivae normal.   Cardiovascular:      Rate and Rhythm: Normal rate and regular rhythm.      Heart sounds: Normal heart sounds. No murmur heard.  Pulmonary:      Effort: Pulmonary effort is normal. No respiratory distress.      Breath sounds: Normal breath sounds. No wheezing or rales.   Abdominal:      General: Abdomen is flat. There is no distension.      Palpations: There is no mass.      Tenderness: There is no abdominal tenderness.   Musculoskeletal:         General: No swelling, tenderness or deformity. Normal range of motion.      Cervical back: Normal range of motion and neck supple.      Right lower leg: No edema.      Left lower leg: No edema.   Skin:     General: Skin is warm.      Coloration: Skin is not jaundiced.      Findings: No bruising.   Neurological:      Mental Status: He is alert.      Cranial Nerves: No cranial nerve deficit.      Motor: Weakness (left sided, LUE weak 2/2 h/o Polio) present.      Comments: Oriented to person and year but not to place  Poor memory recall       Significant Labs: All pertinent labs within the past 24 hours have been reviewed.    Significant Imaging: I have reviewed all pertinent imaging results/findings within the past 24 hours.

## 2022-09-27 NOTE — PROGRESS NOTES
[facility] Bryan - Neurosurgery ([facility])  Neurosurgery  Progress Note    Subjective:     History of Present Illness: Patient is a 77M with PMH of HTN, DM, polio, CAD (on ASA/Coumadin) who presents with multiple falls (including this morning). Patient was transferred from OSH for NSGY for possible brain mass.    Patient has a pacemaker so it is uncertain whether or not he can get an MRI. He is originally from [facility] and was transferred here for care. Patient denies head trauma with his fall this morning. He notes that he has started using a walker in the past few weeks. Patient complains of L leg pain, likely related to his fall. 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      Post-Op Info:  * No surgery found *         Interval History: Patient seems brighter this morning. CTH this am shows smaller lesion with less contrast enhancement.    Medications:  Continuous Infusions:  Scheduled Meds:   aspirin  81 mg Oral Daily    atenoloL  50 mg Oral Daily    atorvastatin  40 mg Oral QHS    dexamethasone  4 mg Intravenous Q6H    fluticasone furoate-vilanteroL  1 puff Inhalation Daily    furosemide  20 mg Oral Daily    insulin aspart U-100  5 Units Subcutaneous TIDWM    insulin detemir U-100  9 Units Subcutaneous QHS    LIDOcaine  1 patch Transdermal Q24H    losartan-hydrochlorothiazide 100-25 mg  1 tablet Oral Daily    nicotine  1 patch Transdermal Daily    OLANZapine  10 mg Oral QHS    pantoprazole  40 mg Oral Daily    polyethylene glycol  17 g Oral Daily    rOPINIRole  1 mg Oral QHS    senna-docusate 8.6-50 mg  1 tablet Oral Daily     PRN Meds:acetaminophen, albuterol-ipratropium, aluminum-magnesium hydroxide-simethicone, dextrose 10%, dextrose 10%, glucagon (human recombinant), glucose, glucose, HYDROcodone-acetaminophen, hydrOXYzine HCL, insulin aspart U-100, melatonin, naloxone, OLANZapine, sodium chloride 0.9%, sodium chloride 0.9%     Review of Systems  Objective:      Weight: 86.2 kg (190 lb)  Body mass index is 25.77 kg/m².  Vital Signs (Most Recent):  Temp: 97.6 °F (36.4 °C) (09/26/22 1927)  Pulse: 70 (09/26/22 1927)  Resp: 18 (09/26/22 1927)  BP: 127/65 (09/26/22 1927)  SpO2: (!) 94 % (09/26/22 1927) Vital Signs (24h Range):  Temp:  [96.1 °F (35.6 °C)-98.6 °F (37 °C)] 97.6 °F (36.4 °C)  Pulse:  [70-78] 70  Resp:  [14-18] 18  SpO2:  [91 %-98 %] 94 %  BP: (125-148)/(61-71) 127/65     Date 09/26/22 0700 - 09/27/22 0659   Shift 0478-1911 2420-3000 7761-6849 24 Hour Total   INTAKE   P.O.  360  360   Shift Total(mL/kg)  360(4.2)  360(4.2)   OUTPUT   Shift Total(mL/kg)       Weight (kg) 86.2 86.2 86.2 86.2                        Physical Exam    Neurosurgery Physical Exam  General: AOx2-3, GCS E4V4M6, higher order confusion  CNII-XII: Intact on fine exam, PERRL, visual fields grossly intact, EOMI, facial sensation preserved, no facial asymmetry, tongue midline, shoulder shrug equal, no pronator drift  Extremities: LUE contracted at baseline (4/5 motor), 5/5 motor otherwise, sensorium intact throughout, coordination intact throughout, DTRs 2+, no pathological reflexes, no sensory level present    Significant Labs:  Recent Labs   Lab 09/25/22  0508 09/26/22  0905   * 289*    139   K 4.0 4.4    106   CO2 21* 22*   BUN 40* 33*   CREATININE 1.1 1.1   CALCIUM 9.5 9.4   MG 1.9 2.0       Recent Labs   Lab 09/25/22  0508 09/26/22  0905   WBC 15.86* 12.27   HGB 14.8 14.4   HCT 45.3 45.9    195       Recent Labs   Lab 09/25/22  0509 09/26/22  0905   INR 1.3* 1.2       Microbiology Results (last 7 days)       Procedure Component Value Units Date/Time    Blood culture [[id number]] Collected: 09/23/22 1546    Order Status: Completed Specimen: Blood from Peripheral, Left Arm Updated: 09/26/22 1812     Blood Culture, Routine No Growth to date      No Growth to date      No Growth to date      No Growth to date    Blood culture [[id number]] Collected: 09/23/22 1545     Order Status: Completed Specimen: Blood from Peripheral, Right Arm Updated: 09/26/22 1812     Blood Culture, Routine No Growth to date      No Growth to date      No Growth to date      No Growth to date    Gram stain [[id number]]     Order Status: No result Specimen: CSF (Spinal Fluid) from CSF Tap, Tube 3     CSF culture [[id number]]     Order Status: No result Specimen: CSF (Spinal Fluid) from CSF Tap, Tube 3           All pertinent labs from the last 24 hours have been reviewed.    Significant Diagnostics:  I have reviewed all pertinent imaging results/findings within the past 24 hours.    Assessment/Plan:     * Brain mass  Patient is a 77M with PMH of HTN, DM, polio, CAD (on ASA/Coumadin with pacemaker) who presents with multiple falls (including this morning). Patient was transferred from OSH for NSGY for possible brain mass.    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   --CTH with contrast 9/20: interventricular diffuse enhancing mass in lateral vents and 3rd vent   --CT chest/abdo/pelvis 9/21: 1.1 cm solid solitary RUL nodule, additional micronodule, hepatosplenomegaly, nonsepcific lucent lesions in L ilum, L3 and L4   --Cannot do MRI - pacemaker MRI compatible, but leads not   --Repeat CTH w wo con 9/26 shows smaller lesion with less contrast enhancement  --LP today, surgical planning pending  --SBP <160  --Na >135  --Dex 4q6  --PUD PPx while steroid treatment ongoing  --Continue to monitor clinically, notify NSGY immediately with any changes in neuro status        Bonita Fontaine MD  Neurosurgery  [facility] - Neurosurgery ([facility])

## 2022-09-27 NOTE — SUBJECTIVE & OBJECTIVE
Interval History: Patient seems brighter this morning. CTH this am shows smaller lesion with less contrast enhancement.    Medications:  Continuous Infusions:  Scheduled Meds:   aspirin  81 mg Oral Daily    atenoloL  50 mg Oral Daily    atorvastatin  40 mg Oral QHS    dexamethasone  4 mg Intravenous Q6H    fluticasone furoate-vilanteroL  1 puff Inhalation Daily    furosemide  20 mg Oral Daily    insulin aspart U-100  5 Units Subcutaneous TIDWM    insulin detemir U-100  9 Units Subcutaneous QHS    LIDOcaine  1 patch Transdermal Q24H    losartan-hydrochlorothiazide 100-25 mg  1 tablet Oral Daily    nicotine  1 patch Transdermal Daily    OLANZapine  10 mg Oral QHS    pantoprazole  40 mg Oral Daily    polyethylene glycol  17 g Oral Daily    rOPINIRole  1 mg Oral QHS    senna-docusate 8.6-50 mg  1 tablet Oral Daily     PRN Meds:acetaminophen, albuterol-ipratropium, aluminum-magnesium hydroxide-simethicone, dextrose 10%, dextrose 10%, glucagon (human recombinant), glucose, glucose, HYDROcodone-acetaminophen, hydrOXYzine HCL, insulin aspart U-100, melatonin, naloxone, OLANZapine, sodium chloride 0.9%, sodium chloride 0.9%     Review of Systems  Objective:     Weight: 86.2 kg (190 lb)  Body mass index is 25.77 kg/m².  Vital Signs (Most Recent):  Temp: 97.6 °F (36.4 °C) (09/26/22 1927)  Pulse: 70 (09/26/22 1927)  Resp: 18 (09/26/22 1927)  BP: 127/65 (09/26/22 1927)  SpO2: (!) 94 % (09/26/22 1927) Vital Signs (24h Range):  Temp:  [96.1 °F (35.6 °C)-98.6 °F (37 °C)] 97.6 °F (36.4 °C)  Pulse:  [70-78] 70  Resp:  [14-18] 18  SpO2:  [91 %-98 %] 94 %  BP: (125-148)/(61-71) 127/65     Date 09/26/22 0700 - 09/27/22 0659   Shift 2951-3978 0606-0101 6297-1752 24 Hour Total   INTAKE   P.O.  360  360   Shift Total(mL/kg)  360(4.2)  360(4.2)   OUTPUT   Shift Total(mL/kg)       Weight (kg) 86.2 86.2 86.2 86.2                        Physical Exam    Neurosurgery Physical Exam  General: AOx2-3, GCS E4V4M6, higher order confusion  CNII-XII:  Intact on fine exam, PERRL, visual fields grossly intact, EOMI, facial sensation preserved, no facial asymmetry, tongue midline, shoulder shrug equal, no pronator drift  Extremities: LUE contracted at baseline (4/5 motor), 5/5 motor otherwise, sensorium intact throughout, coordination intact throughout, DTRs 2+, no pathological reflexes, no sensory level present    Significant Labs:  Recent Labs   Lab 09/25/22  0508 09/26/22  0905   * 289*    139   K 4.0 4.4    106   CO2 21* 22*   BUN 40* 33*   CREATININE 1.1 1.1   CALCIUM 9.5 9.4   MG 1.9 2.0       Recent Labs   Lab 09/25/22  0508 09/26/22  0905   WBC 15.86* 12.27   HGB 14.8 14.4   HCT 45.3 45.9    195       Recent Labs   Lab 09/25/22  0509 09/26/22  0905   INR 1.3* 1.2       Microbiology Results (last 7 days)       Procedure Component Value Units Date/Time    Blood culture [[id number]] Collected: 09/23/22 1546    Order Status: Completed Specimen: Blood from Peripheral, Left Arm Updated: 09/26/22 1812     Blood Culture, Routine No Growth to date      No Growth to date      No Growth to date      No Growth to date    Blood culture [[id number]] Collected: 09/23/22 1546    Order Status: Completed Specimen: Blood from Peripheral, Right Arm Updated: 09/26/22 1812     Blood Culture, Routine No Growth to date      No Growth to date      No Growth to date      No Growth to date    Gram stain [[id number]]     Order Status: No result Specimen: CSF (Spinal Fluid) from CSF Tap, Tube 3     CSF culture [[id number]]     Order Status: No result Specimen: CSF (Spinal Fluid) from CSF Tap, Tube 3           All pertinent labs from the last 24 hours have been reviewed.    Significant Diagnostics:  I have reviewed all pertinent imaging results/findings within the past 24 hours.

## 2022-09-27 NOTE — ASSESSMENT & PLAN NOTE
77-year-old male with extensive past medical history presenting from [facility] for Neurosurgery evaluation of a brain mass found on CT head.  Per patient wife, patient has had sudden memory problems in the last 2 weeks with recurrent falls, but without loss of consciousness, seizure activity, head trauma.  Baseline is normal without other limitations.  Patient also has been describing 1 year of headache.  Has seen neurology outpatient, and has been taking gabapentin and undergoing nerve block procedures in neck with little effect. 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     Concerns for metastatic disease, possibly lung as primary given extensive smoking history.    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     Patient's pacemaker leads are NOT compatible with MRI    Plan:   -- NSGY evaluated, appreciate recs (pending recs in regards to surgical intervention)   --Dex 4mg q6  --PUD PPx while steroid treatment ongoing, patient on home pantoprazole 40mg qd  --Continue to monitor clinically, notify NSGY immediately with any changes in neuro status  -- ID consulted to r/o infectious etiology of mass   - f/u toxoplasma IgG and IgM, immunodeficiency panel, serum cryptococcus antibody    - IR for fluoro guided LP on 9/27   - f/u CSF labs: cell count with diff, protein, glucose, gram stain, culture, cytology, and flow cytometry   - pulmonary nodule bx can be done outpatient with studies (pathology, cytology, aerobic/anaerobic/fungal/AFB cultures)  -- zyprexa  10mg nightly for agitation, 5mg BID PRN for breakthrough  -- no immediate surgical intervention planned  -- Neuro check q4h  -- Fall, Aspiration, Delirium precautions

## 2022-09-27 NOTE — ASSESSMENT & PLAN NOTE
Patient's FSGs are controlled on current medication regimen.  Last A1c reviewed-   Lab Results   Component Value Date    HGBA1C 6.7 (H) 09/20/2022     Most recent fingerstick glucose reviewed-   Recent Labs   Lab 09/26/22  1634 09/26/22  2142 09/27/22  0742 09/27/22  1147   POCTGLUCOSE 317* 264* 271* 243*     Current correctional scale  Medium  Maintain anti-hyperglycemic dose as follows-   Antihyperglycemics (From admission, onward)    Start     Stop Route Frequency Ordered    09/27/22 2100  insulin detemir U-100 pen 12 Units         -- SubQ Nightly 09/27/22 1144    09/27/22 1645  insulin aspart U-100 pen 7 Units         -- SubQ 3 times daily with meals 09/27/22 1144    09/20/22 2250  insulin aspart U-100 pen 1-10 Units         -- SubQ Before meals & nightly PRN 09/20/22 2152      Home medications: metformin, pioglitazone, Victoza    -- Hold oral hyperglycemics  -- SSI, POCT BG qACHS; titrate basal/bolus regimen PRN to 140-180 goal

## 2022-09-27 NOTE — PLAN OF CARE
Problem: Adult Inpatient Plan of Care  Goal: Plan of Care Review  Outcome: Ongoing, Progressing  Goal: Patient-Specific Goal (Individualized)  Outcome: Ongoing, Progressing  Goal: Absence of Hospital-Acquired Illness or Injury  Outcome: Ongoing, Progressing  Intervention: Identify and Manage Fall Risk  Flowsheets (Taken 9/27/2022 1151)  Safety Promotion/Fall Prevention:   assistive device/personal item within reach   bed alarm set   Fall Risk reviewed with patient/family   Fall Risk signage in place   lighting adjusted   /camera at bedside   side rails raised x 3   family to remain at bedside   medications reviewed  Intervention: Prevent Skin Injury  Flowsheets (Taken 9/27/2022 1151)  Body Position:   turned   weight shifting   sitting up in bed  Skin Protection: adhesive use limited  Intervention: Prevent and Manage VTE (Venous Thromboembolism) Risk  Flowsheets (Taken 9/27/2022 1151)  Activity Management: Rolling - L1  Range of Motion: active ROM (range of motion) encouraged  Intervention: Prevent Infection  Flowsheets (Taken 9/27/2022 1151)  Infection Prevention:   hand hygiene promoted   single patient room provided  Goal: Optimal Comfort and Wellbeing  Outcome: Ongoing, Progressing  Intervention: Monitor Pain and Promote Comfort  Flowsheets (Taken 9/27/2022 1151)  Pain Management Interventions:   care clustered   pillow support provided   position adjusted  Intervention: Provide Person-Centered Care  Flowsheets (Taken 9/27/2022 1151)  Trust Relationship/Rapport:   questions answered   care explained   questions encouraged   choices provided  Goal: Readiness for Transition of Care  Outcome: Ongoing, Progressing     Problem: Fall Injury Risk  Goal: Absence of Fall and Fall-Related Injury  Outcome: Ongoing, Progressing  Intervention: Identify and Manage Contributors  Flowsheets (Taken 9/27/2022 1151)  Self-Care Promotion: independence encouraged  Medication Review/Management: medications  reviewed  Intervention: Promote Injury-Free Environment  Flowsheets (Taken 9/27/2022 1151)  Safety Promotion/Fall Prevention:   assistive device/personal item within reach   bed alarm set   Fall Risk reviewed with patient/family   Fall Risk signage in place   lighting adjusted   /camera at bedside   side rails raised x 3   family to remain at bedside   medications reviewed     Problem: Diabetes Comorbidity  Goal: Blood Glucose Level Within Targeted Range  Outcome: Ongoing, Progressing  Intervention: Monitor and Manage Glycemia  Flowsheets (Taken 9/27/2022 1151)  Glycemic Management: blood glucose monitored     Problem: Skin Injury Risk Increased  Goal: Skin Health and Integrity  Outcome: Ongoing, Progressing  Intervention: Optimize Skin Protection  Flowsheets (Taken 9/27/2022 1151)  Pressure Reduction Techniques:   frequent weight shift encouraged   weight shift assistance provided  Pressure Reduction Devices:   pressure-redistributing mattress utilized   positioning supports utilized   foam padding utilized  Skin Protection: adhesive use limited  Head of Bed (HOB) Positioning: HOB elevated  Intervention: Promote and Optimize Oral Intake  Flowsheets (Taken 9/27/2022 1151)  Oral Nutrition Promotion: calorie-dense foods provided     Problem: Impaired Wound Healing  Goal: Optimal Wound Healing  Outcome: Ongoing, Progressing  Intervention: Promote Wound Healing  Flowsheets (Taken 9/27/2022 1151)  Oral Nutrition Promotion: calorie-dense foods provided  Sleep/Rest Enhancement:   awakenings minimized   family presence promoted  Activity Management: Rolling - L1  Pain Management Interventions:   care clustered   pillow support provided   position adjusted     Problem: Pain Acute  Goal: Acceptable Pain Control and Functional Ability  Outcome: Ongoing, Progressing  Intervention: Develop Pain Management Plan  Flowsheets (Taken 9/27/2022 1151)  Pain Management Interventions:   care clustered   pillow support  provided   position adjusted  Intervention: Prevent or Manage Pain  Flowsheets (Taken 9/27/2022 1151)  Sleep/Rest Enhancement:   awakenings minimized   family presence promoted  Medication Review/Management: medications reviewed

## 2022-09-28 ENCOUNTER — DOCUMENTATION ONLY (OUTPATIENT)
Dept: CARDIOLOGY | Facility: HOSPITAL | Age: 77
End: 2022-09-28
Payer: MEDICARE

## 2022-09-28 LAB
ALBUMIN SERPL BCP-MCNC: 3.5 G/DL (ref 3.5–5.2)
ALP SERPL-CCNC: 62 U/L (ref 55–135)
ALT SERPL W/O P-5'-P-CCNC: 14 U/L (ref 10–44)
ANION GAP SERPL CALC-SCNC: 10 MMOL/L (ref 8–16)
AST SERPL-CCNC: 11 U/L (ref 10–40)
BACTERIA BLD CULT: NORMAL
BACTERIA BLD CULT: NORMAL
BASOPHILS # BLD AUTO: 0.04 K/UL (ref 0–0.2)
BASOPHILS NFR BLD: 0.3 % (ref 0–1.9)
BILIRUB SERPL-MCNC: 0.6 MG/DL (ref 0.1–1)
BUN SERPL-MCNC: 31 MG/DL (ref 8–23)
CALCIUM SERPL-MCNC: 9.4 MG/DL (ref 8.7–10.5)
CHLORIDE SERPL-SCNC: 102 MMOL/L (ref 95–110)
CLARITY CSF: CLEAR
CLARITY CSF: CLEAR
CO2 SERPL-SCNC: 26 MMOL/L (ref 23–29)
COLOR CSF: COLORLESS
COLOR CSF: COLORLESS
CREAT SERPL-MCNC: 1 MG/DL (ref 0.5–1.4)
DIFFERENTIAL METHOD: ABNORMAL
EOSINOPHIL # BLD AUTO: 0 K/UL (ref 0–0.5)
EOSINOPHIL NFR BLD: 0 % (ref 0–8)
ERYTHROCYTE [DISTWIDTH] IN BLOOD BY AUTOMATED COUNT: 15.9 % (ref 11.5–14.5)
EST. GFR  (NO RACE VARIABLE): >60 ML/MIN/1.73 M^2
GLUCOSE CSF-MCNC: 178 MG/DL (ref 40–70)
GLUCOSE SERPL-MCNC: 352 MG/DL (ref 70–110)
HCT VFR BLD AUTO: 46.2 % (ref 40–54)
HGB BLD-MCNC: 15 G/DL (ref 14–18)
IMM GRANULOCYTES # BLD AUTO: 0.24 K/UL (ref 0–0.04)
IMM GRANULOCYTES NFR BLD AUTO: 1.9 % (ref 0–0.5)
INR PPP: 1.2 (ref 0.8–1.2)
LYMPHOCYTES # BLD AUTO: 1 K/UL (ref 1–4.8)
LYMPHOCYTES NFR BLD: 7.5 % (ref 18–48)
LYMPHOCYTES NFR CSF MANUAL: 96 % (ref 40–80)
LYMPHOCYTES NFR CSF MANUAL: 96 % (ref 40–80)
MAGNESIUM SERPL-MCNC: 2 MG/DL (ref 1.6–2.6)
MCH RBC QN AUTO: 29.5 PG (ref 27–31)
MCHC RBC AUTO-ENTMCNC: 32.5 G/DL (ref 32–36)
MCV RBC AUTO: 91 FL (ref 82–98)
MONOCYTES # BLD AUTO: 0.8 K/UL (ref 0.3–1)
MONOCYTES NFR BLD: 6.1 % (ref 4–15)
MONOS+MACROS NFR CSF MANUAL: 4 % (ref 15–45)
MONOS+MACROS NFR CSF MANUAL: 4 % (ref 15–45)
NEUTROPHILS # BLD AUTO: 10.8 K/UL (ref 1.8–7.7)
NEUTROPHILS NFR BLD: 84.2 % (ref 38–73)
NRBC BLD-RTO: 0 /100 WBC
PHOSPHATE SERPL-MCNC: 2.5 MG/DL (ref 2.7–4.5)
PLATELET # BLD AUTO: 208 K/UL (ref 150–450)
PMV BLD AUTO: 11.8 FL (ref 9.2–12.9)
POCT GLUCOSE: 212 MG/DL (ref 70–110)
POCT GLUCOSE: 241 MG/DL (ref 70–110)
POCT GLUCOSE: 289 MG/DL (ref 70–110)
POTASSIUM SERPL-SCNC: 4.5 MMOL/L (ref 3.5–5.1)
PROT CSF-MCNC: 291 MG/DL (ref 15–40)
PROT SERPL-MCNC: 6.9 G/DL (ref 6–8.4)
PROTHROMBIN TIME: 11.9 SEC (ref 9–12.5)
RBC # BLD AUTO: 5.08 M/UL (ref 4.6–6.2)
RBC # CSF: 16 /CU MM
RBC # CSF: 2 /CU MM
SODIUM SERPL-SCNC: 138 MMOL/L (ref 136–145)
SPECIMEN VOL CSF: 2 ML
SPECIMEN VOL CSF: 3 ML
T GONDII IGG SER QL IA: NORMAL
T GONDII IGG SERPL IA-ACNC: <5 IU/ML (ref 0–6.4)
T GONDII IGM SER-ACNC: <3 AU/ML
WBC # BLD AUTO: 12.85 K/UL (ref 3.9–12.7)
WBC # CSF: 35 /CU MM (ref 0–5)
WBC # CSF: 64 /CU MM (ref 0–5)

## 2022-09-28 PROCEDURE — 99232 SBSQ HOSP IP/OBS MODERATE 35: CPT | Mod: ,,, | Performed by: NEUROLOGICAL SURGERY

## 2022-09-28 PROCEDURE — 88189 FLOWCYTOMETRY/READ 16 & >: CPT | Mod: ,,, | Performed by: PATHOLOGY

## 2022-09-28 PROCEDURE — 63600175 PHARM REV CODE 636 W HCPCS: Performed by: NURSE ANESTHETIST, CERTIFIED REGISTERED

## 2022-09-28 PROCEDURE — 87070 CULTURE OTHR SPECIMN AEROBIC: CPT

## 2022-09-28 PROCEDURE — D9220A PRA ANESTHESIA: ICD-10-PCS | Mod: CRNA,,, | Performed by: NURSE ANESTHETIST, CERTIFIED REGISTERED

## 2022-09-28 PROCEDURE — 11000001 HC ACUTE MED/SURG PRIVATE ROOM

## 2022-09-28 PROCEDURE — 63600175 PHARM REV CODE 636 W HCPCS

## 2022-09-28 PROCEDURE — 88108 CYTOPATH CONCENTRATE TECH: CPT | Performed by: PATHOLOGY

## 2022-09-28 PROCEDURE — D9220A PRA ANESTHESIA: Mod: ANES,,, | Performed by: STUDENT IN AN ORGANIZED HEALTH CARE EDUCATION/TRAINING PROGRAM

## 2022-09-28 PROCEDURE — 85025 COMPLETE CBC W/AUTO DIFF WBC: CPT

## 2022-09-28 PROCEDURE — 71000033 HC RECOVERY, INTIAL HOUR

## 2022-09-28 PROCEDURE — 99233 PR SUBSEQUENT HOSPITAL CARE,LEVL III: ICD-10-PCS | Mod: GC,,, | Performed by: HOSPITALIST

## 2022-09-28 PROCEDURE — 88185 FLOWCYTOMETRY/TC ADD-ON: CPT | Mod: 59 | Performed by: PATHOLOGY

## 2022-09-28 PROCEDURE — 89051 BODY FLUID CELL COUNT: CPT | Mod: 91

## 2022-09-28 PROCEDURE — 88108 PR  CYTOPATH FLUIDS,CONCENTRATN,INTERP: ICD-10-PCS | Mod: 26,,, | Performed by: PATHOLOGY

## 2022-09-28 PROCEDURE — 82945 GLUCOSE OTHER FLUID: CPT

## 2022-09-28 PROCEDURE — 25000003 PHARM REV CODE 250: Performed by: EMERGENCY MEDICINE

## 2022-09-28 PROCEDURE — D9220A PRA ANESTHESIA: ICD-10-PCS | Mod: ANES,,, | Performed by: STUDENT IN AN ORGANIZED HEALTH CARE EDUCATION/TRAINING PROGRAM

## 2022-09-28 PROCEDURE — 99000 SPECIMEN HANDLING OFFICE-LAB: CPT

## 2022-09-28 PROCEDURE — 25000003 PHARM REV CODE 250: Performed by: NURSE ANESTHETIST, CERTIFIED REGISTERED

## 2022-09-28 PROCEDURE — 85610 PROTHROMBIN TIME: CPT

## 2022-09-28 PROCEDURE — 88189 PR  FLOWCYTOMETRY/READ, 16 & > MARKERS: ICD-10-PCS | Mod: ,,, | Performed by: PATHOLOGY

## 2022-09-28 PROCEDURE — 80053 COMPREHEN METABOLIC PANEL: CPT

## 2022-09-28 PROCEDURE — 25000003 PHARM REV CODE 250: Performed by: HOSPITALIST

## 2022-09-28 PROCEDURE — 99900035 HC TECH TIME PER 15 MIN (STAT)

## 2022-09-28 PROCEDURE — 84157 ASSAY OF PROTEIN OTHER: CPT

## 2022-09-28 PROCEDURE — 25000003 PHARM REV CODE 250

## 2022-09-28 PROCEDURE — D9220A PRA ANESTHESIA: Mod: CRNA,,, | Performed by: NURSE ANESTHETIST, CERTIFIED REGISTERED

## 2022-09-28 PROCEDURE — 97530 THERAPEUTIC ACTIVITIES: CPT

## 2022-09-28 PROCEDURE — 36415 COLL VENOUS BLD VENIPUNCTURE: CPT

## 2022-09-28 PROCEDURE — 94761 N-INVAS EAR/PLS OXIMETRY MLT: CPT

## 2022-09-28 PROCEDURE — 84100 ASSAY OF PHOSPHORUS: CPT

## 2022-09-28 PROCEDURE — 97116 GAIT TRAINING THERAPY: CPT

## 2022-09-28 PROCEDURE — 88108 CYTOPATH CONCENTRATE TECH: CPT | Mod: 26,,, | Performed by: PATHOLOGY

## 2022-09-28 PROCEDURE — 83735 ASSAY OF MAGNESIUM: CPT

## 2022-09-28 PROCEDURE — 37000009 HC ANESTHESIA EA ADD 15 MINS

## 2022-09-28 PROCEDURE — 88184 FLOWCYTOMETRY/ TC 1 MARKER: CPT | Performed by: PATHOLOGY

## 2022-09-28 PROCEDURE — 87205 SMEAR GRAM STAIN: CPT

## 2022-09-28 PROCEDURE — 99232 PR SUBSEQUENT HOSPITAL CARE,LEVL II: ICD-10-PCS | Mod: ,,, | Performed by: NEUROLOGICAL SURGERY

## 2022-09-28 PROCEDURE — 94660 CPAP INITIATION&MGMT: CPT

## 2022-09-28 PROCEDURE — 37000008 HC ANESTHESIA 1ST 15 MINUTES

## 2022-09-28 PROCEDURE — 99233 SBSQ HOSP IP/OBS HIGH 50: CPT | Mod: GC,,, | Performed by: HOSPITALIST

## 2022-09-28 PROCEDURE — 97112 NEUROMUSCULAR REEDUCATION: CPT

## 2022-09-28 PROCEDURE — S4991 NICOTINE PATCH NONLEGEND: HCPCS

## 2022-09-28 RX ORDER — ROCURONIUM BROMIDE 10 MG/ML
INJECTION, SOLUTION INTRAVENOUS
Status: DISCONTINUED | OUTPATIENT
Start: 2022-09-28 | End: 2022-09-28

## 2022-09-28 RX ORDER — ENOXAPARIN SODIUM 100 MG/ML
40 INJECTION SUBCUTANEOUS EVERY 24 HOURS
Status: DISCONTINUED | OUTPATIENT
Start: 2022-09-28 | End: 2022-09-28

## 2022-09-28 RX ORDER — DIPHENHYDRAMINE HYDROCHLORIDE 50 MG/ML
INJECTION INTRAMUSCULAR; INTRAVENOUS
Status: DISCONTINUED | OUTPATIENT
Start: 2022-09-28 | End: 2022-09-28

## 2022-09-28 RX ORDER — ENOXAPARIN SODIUM 100 MG/ML
1 INJECTION SUBCUTANEOUS 2 TIMES DAILY
Status: DISCONTINUED | OUTPATIENT
Start: 2022-09-28 | End: 2022-10-01

## 2022-09-28 RX ORDER — CALCIUM CHLORIDE INJECTION 100 MG/ML
INJECTION, SOLUTION INTRAVENOUS
Status: DISCONTINUED | OUTPATIENT
Start: 2022-09-28 | End: 2022-09-28

## 2022-09-28 RX ORDER — OLANZAPINE 2.5 MG/1
10 TABLET ORAL NIGHTLY
Status: DISCONTINUED | OUTPATIENT
Start: 2022-09-28 | End: 2022-09-29

## 2022-09-28 RX ORDER — INSULIN ASPART 100 [IU]/ML
9 INJECTION, SOLUTION INTRAVENOUS; SUBCUTANEOUS
Status: DISCONTINUED | OUTPATIENT
Start: 2022-09-28 | End: 2022-09-29

## 2022-09-28 RX ORDER — ONDANSETRON 2 MG/ML
INJECTION INTRAMUSCULAR; INTRAVENOUS
Status: DISCONTINUED | OUTPATIENT
Start: 2022-09-28 | End: 2022-09-28

## 2022-09-28 RX ORDER — ETOMIDATE 2 MG/ML
INJECTION INTRAVENOUS
Status: DISCONTINUED | OUTPATIENT
Start: 2022-09-28 | End: 2022-09-28

## 2022-09-28 RX ORDER — DEXAMETHASONE SODIUM PHOSPHATE 4 MG/ML
INJECTION, SOLUTION INTRA-ARTICULAR; INTRALESIONAL; INTRAMUSCULAR; INTRAVENOUS; SOFT TISSUE
Status: DISCONTINUED | OUTPATIENT
Start: 2022-09-28 | End: 2022-09-28

## 2022-09-28 RX ORDER — LIDOCAINE HYDROCHLORIDE 10 MG/ML
2 INJECTION INFILTRATION; PERINEURAL ONCE
Status: COMPLETED | OUTPATIENT
Start: 2022-09-28 | End: 2022-09-28

## 2022-09-28 RX ORDER — LIDOCAINE HYDROCHLORIDE 20 MG/ML
INJECTION, SOLUTION EPIDURAL; INFILTRATION; INTRACAUDAL; PERINEURAL
Status: DISCONTINUED | OUTPATIENT
Start: 2022-09-28 | End: 2022-09-28

## 2022-09-28 RX ORDER — PHENYLEPHRINE HYDROCHLORIDE 10 MG/ML
INJECTION INTRAVENOUS
Status: DISCONTINUED | OUTPATIENT
Start: 2022-09-28 | End: 2022-09-28

## 2022-09-28 RX ADMIN — LIDOCAINE HYDROCHLORIDE 50 MG: 20 INJECTION, SOLUTION EPIDURAL; INFILTRATION; INTRACAUDAL; PERINEURAL at 10:09

## 2022-09-28 RX ADMIN — ETOMIDATE 6 MG: 2 INJECTION INTRAVENOUS at 10:09

## 2022-09-28 RX ADMIN — ROPINIROLE HYDROCHLORIDE 1 MG: 1 TABLET, FILM COATED ORAL at 08:09

## 2022-09-28 RX ADMIN — INSULIN ASPART 4 UNITS: 100 INJECTION, SOLUTION INTRAVENOUS; SUBCUTANEOUS at 05:09

## 2022-09-28 RX ADMIN — ASPIRIN 81 MG: 81 TABLET, COATED ORAL at 12:09

## 2022-09-28 RX ADMIN — CALCIUM CHLORIDE INJECTION 0.5 G: 100 INJECTION, SOLUTION INTRAVENOUS at 11:09

## 2022-09-28 RX ADMIN — FUROSEMIDE 20 MG: 20 TABLET ORAL at 12:09

## 2022-09-28 RX ADMIN — PANTOPRAZOLE SODIUM 40 MG: 40 TABLET, DELAYED RELEASE ORAL at 12:09

## 2022-09-28 RX ADMIN — INSULIN ASPART 9 UNITS: 100 INJECTION, SOLUTION INTRAVENOUS; SUBCUTANEOUS at 05:09

## 2022-09-28 RX ADMIN — INSULIN ASPART 7 UNITS: 100 INJECTION, SOLUTION INTRAVENOUS; SUBCUTANEOUS at 08:09

## 2022-09-28 RX ADMIN — SENNOSIDES AND DOCUSATE SODIUM 2 TABLET: 50; 8.6 TABLET ORAL at 12:09

## 2022-09-28 RX ADMIN — LIDOCAINE 1 PATCH: 50 PATCH CUTANEOUS at 12:09

## 2022-09-28 RX ADMIN — MELATONIN TAB 3 MG 6 MG: 3 TAB at 08:09

## 2022-09-28 RX ADMIN — INSULIN ASPART 4 UNITS: 100 INJECTION, SOLUTION INTRAVENOUS; SUBCUTANEOUS at 12:09

## 2022-09-28 RX ADMIN — ROCURONIUM BROMIDE 50 MG: 10 INJECTION INTRAVENOUS at 10:09

## 2022-09-28 RX ADMIN — ENOXAPARIN SODIUM 90 MG: 100 INJECTION SUBCUTANEOUS at 08:09

## 2022-09-28 RX ADMIN — Medication 1 PATCH: at 12:09

## 2022-09-28 RX ADMIN — DIPHENHYDRAMINE HYDROCHLORIDE 12.5 MG: 50 INJECTION INTRAMUSCULAR; INTRAVENOUS at 10:09

## 2022-09-28 RX ADMIN — SODIUM CHLORIDE, SODIUM GLUCONATE, SODIUM ACETATE, POTASSIUM CHLORIDE, MAGNESIUM CHLORIDE, SODIUM PHOSPHATE, DIBASIC, AND POTASSIUM PHOSPHATE: .53; .5; .37; .037; .03; .012; .00082 INJECTION, SOLUTION INTRAVENOUS at 10:09

## 2022-09-28 RX ADMIN — POLYETHYLENE GLYCOL 3350 17 G: 17 POWDER, FOR SOLUTION ORAL at 12:09

## 2022-09-28 RX ADMIN — LOSARTAN POTASSIUM AND HYDROCHLOROTHIAZIDE 1 TABLET: 100; 25 TABLET, FILM COATED ORAL at 12:09

## 2022-09-28 RX ADMIN — ATENOLOL 50 MG: 25 TABLET ORAL at 12:09

## 2022-09-28 RX ADMIN — INSULIN ASPART 4 UNITS: 100 INJECTION, SOLUTION INTRAVENOUS; SUBCUTANEOUS at 08:09

## 2022-09-28 RX ADMIN — OLANZAPINE 10 MG: 2.5 TABLET, FILM COATED ORAL at 05:09

## 2022-09-28 RX ADMIN — ATORVASTATIN CALCIUM 40 MG: 40 TABLET, FILM COATED ORAL at 08:09

## 2022-09-28 RX ADMIN — DEXAMETHASONE SODIUM PHOSPHATE 4 MG: 4 INJECTION INTRA-ARTICULAR; INTRALESIONAL; INTRAMUSCULAR; INTRAVENOUS; SOFT TISSUE at 12:09

## 2022-09-28 RX ADMIN — POLYETHYLENE GLYCOL 3350 17 G: 17 POWDER, FOR SOLUTION ORAL at 08:09

## 2022-09-28 RX ADMIN — INSULIN ASPART 9 UNITS: 100 INJECTION, SOLUTION INTRAVENOUS; SUBCUTANEOUS at 12:09

## 2022-09-28 RX ADMIN — DEXAMETHASONE SODIUM PHOSPHATE 4 MG: 4 INJECTION INTRA-ARTICULAR; INTRALESIONAL; INTRAMUSCULAR; INTRAVENOUS; SOFT TISSUE at 06:09

## 2022-09-28 RX ADMIN — INSULIN ASPART 6 UNITS: 100 INJECTION, SOLUTION INTRAVENOUS; SUBCUTANEOUS at 08:09

## 2022-09-28 RX ADMIN — SODIUM PHOSPHATE, MONOBASIC, MONOHYDRATE 15 MMOL: 276; 142 INJECTION, SOLUTION INTRAVENOUS at 07:09

## 2022-09-28 RX ADMIN — LIDOCAINE HYDROCHLORIDE 2 ML: 10 INJECTION, SOLUTION INFILTRATION; PERINEURAL at 10:09

## 2022-09-28 RX ADMIN — DEXAMETHASONE SODIUM PHOSPHATE 8 MG: 4 INJECTION INTRA-ARTICULAR; INTRALESIONAL; INTRAMUSCULAR; INTRAVENOUS; SOFT TISSUE at 10:09

## 2022-09-28 RX ADMIN — ONDANSETRON 4 MG: 2 INJECTION INTRAMUSCULAR; INTRAVENOUS at 10:09

## 2022-09-28 RX ADMIN — HYDROCODONE BITARTRATE AND ACETAMINOPHEN 1 TABLET: 10; 325 TABLET ORAL at 12:09

## 2022-09-28 RX ADMIN — HYDROXYZINE HYDROCHLORIDE 25 MG: 25 TABLET, FILM COATED ORAL at 08:09

## 2022-09-28 RX ADMIN — ETOMIDATE 12 MG: 2 INJECTION INTRAVENOUS at 10:09

## 2022-09-28 RX ADMIN — PHENYLEPHRINE HYDROCHLORIDE 200 MCG: 10 INJECTION INTRAVENOUS at 11:09

## 2022-09-28 NOTE — TRANSFER OF CARE
Anesthesia Transfer of Care Note    Patient: Kieran Styles    Procedure(s) Performed: Procedure(s) (LRB):  Lumbar Puncture (N/A)    Patient location: PACU    Anesthesia Type: general    Transport from OR: Transported from OR on 6-10 L/min O2 by face mask with adequate spontaneous ventilation    Post pain: adequate analgesia    Post assessment: no apparent anesthetic complications and tolerated procedure well    Post vital signs: stable    Level of consciousness: lethargic    Nausea/Vomiting: no nausea/vomiting    Complications: none    Transfer of care protocol was followed      Last vitals:   Visit Vitals  /77   Pulse 68   Temp 36.3 °C (97.3 °F)   Resp 18   Ht 6' (1.829 m)   Wt 86.2 kg (190 lb)   SpO2 (!) 93%   BMI 25.77 kg/m²

## 2022-09-28 NOTE — ANESTHESIA POSTPROCEDURE EVALUATION
Anesthesia Post Evaluation    Patient: Kieran Styles    Procedure(s) Performed: Procedure(s) (LRB):  Lumbar Puncture (N/A)    Final Anesthesia Type: general      Patient location during evaluation: PACU  Patient participation: Yes- Able to Participate  Level of consciousness: awake  Post-procedure vital signs: reviewed and stable  Pain management: adequate  Airway patency: patent    PONV status at discharge: No PONV  Anesthetic complications: no      Cardiovascular status: blood pressure returned to baseline  Respiratory status: unassisted            Vitals Value Taken Time   /77 09/28/22 1219   Temp 36.3 °C (97.3 °F) 09/28/22 1219   Pulse 68 09/28/22 1219   Resp 18 09/28/22 1244   SpO2 93 % 09/28/22 1219         Event Time   Out of Recovery 09/28/2022 12:15:00         Pain/Lashawn Score: Pain Rating Prior to Med Admin: 7 (9/28/2022 12:44 PM)  Lashawn Score: 9 (9/28/2022 11:45 AM)

## 2022-09-28 NOTE — PROGRESS NOTES
[facility] - Surgery ([facility])  Neurosurgery  Progress Note    Subjective:     History of Present Illness: Patient is a 77M with PMH of HTN, DM, polio, CAD (on ASA/Coumadin) who presents with multiple falls (including this morning). Patient was transferred from OSH for NSGY for possible brain mass.    Patient has a pacemaker so it is uncertain whether or not he can get an MRI. He is originally from [facility] and was transferred here for care. Patient denies head trauma with his fall this morning. He notes that he has started using a walker in the past few weeks. Patient complains of L leg pain, likely related to his fall. 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      Post-Op Info:  Procedure(s) (LRB):  Lumbar Puncture (N/A)   Day of Surgery     Interval History: 9/28: Neuro stable. AFVSS. LP with anesthesia yesterday unsuccessful. Pending LP with IR today.    Medications:  Continuous Infusions:  Scheduled Meds:   aspirin  81 mg Oral Daily    atenoloL  50 mg Oral Daily    atorvastatin  40 mg Oral QHS    dexamethasone  4 mg Intravenous Q6H    fluticasone furoate-vilanteroL  1 puff Inhalation Daily    furosemide  20 mg Oral Daily    insulin aspart U-100  7 Units Subcutaneous TIDWM    insulin detemir U-100  12 Units Subcutaneous QHS    LIDOcaine  1 patch Transdermal Q24H    losartan-hydrochlorothiazide 100-25 mg  1 tablet Oral Daily    nicotine  1 patch Transdermal Daily    OLANZapine  10 mg Oral QHS    pantoprazole  40 mg Oral Daily    polyethylene glycol  17 g Oral BID    rOPINIRole  1 mg Oral QHS    senna-docusate 8.6-50 mg  2 tablet Oral Daily    sodium phosphate IVPB  15 mmol Intravenous Once     PRN Meds:acetaminophen, albuterol-ipratropium, aluminum-magnesium hydroxide-simethicone, dextrose 10%, dextrose 10%, glucagon (human recombinant), glucose, glucose, HYDROcodone-acetaminophen, hydrOXYzine HCL, insulin aspart U-100, melatonin, naloxone, OLANZapine, sodium  chloride 0.9%, sodium chloride 0.9%     Review of Systems  Objective:     Weight: 86.2 kg (190 lb)  Body mass index is 25.77 kg/m².  Vital Signs (Most Recent):  Temp: 97.6 °F (36.4 °C) (09/28/22 0924)  Pulse: 70 (09/28/22 0924)  Resp: 17 (09/28/22 0924)  BP: (!) 151/71 (09/28/22 0924)  SpO2: 96 % (09/28/22 0924) Vital Signs (24h Range):  Temp:  [96.3 °F (35.7 °C)-98.1 °F (36.7 °C)] 97.6 °F (36.4 °C)  Pulse:  [69-74] 70  Resp:  [17-20] 17  SpO2:  [92 %-96 %] 96 %  BP: (127-162)/(59-77) 151/71                            Physical Exam    Neurosurgery Physical Exam  General: AOx2, GCS [id number], higher order confusion  CNII-XII: Intact on fine exam, PERRL, visual fields grossly intact, EOMI, facial sensation preserved, no facial asymmetry, tongue midline, shoulder shrug equal, no pronator drift  Extremities: LUE contracted at baseline (4/5 motor), 5/5 motor otherwise, sensorium intact throughout, coordination intact throughout, DTRs 2+, no pathological reflexes, no sensory level present    Significant Labs:  Recent Labs   Lab 09/27/22 0618 09/28/22 0458   * 352*    138   K 4.2 4.5    102   CO2 24 26   BUN 26* 31*   CREATININE 0.9 1.0   CALCIUM 9.4 9.4   MG 2.0 2.0       Recent Labs   Lab 09/27/22 0618 09/28/22 0458   WBC 11.87 12.85*   HGB 14.9 15.0   HCT 46.4 46.2    208       Recent Labs   Lab 09/27/22 0618 09/28/22 0458   INR 1.2 1.2       Microbiology Results (last 7 days)       Procedure Component Value Units Date/Time    Blood culture [[id number]] Collected: 09/23/22 1547    Order Status: Completed Specimen: Blood from Peripheral, Left Arm Updated: 09/27/22 1812     Blood Culture, Routine No Growth to date      No Growth to date      No Growth to date      No Growth to date      No Growth to date    Blood culture [[id number]] Collected: 09/23/22 1546    Order Status: Completed Specimen: Blood from Peripheral, Right Arm Updated: 09/27/22 1812     Blood Culture, Routine No Growth to date       No Growth to date      No Growth to date      No Growth to date      No Growth to date    Gram stain [[id number]]     Order Status: No result Specimen: CSF (Spinal Fluid) from CSF Tap, Tube 3     CSF culture [[id number]]     Order Status: No result Specimen: CSF (Spinal Fluid) from CSF Tap, Tube 3           All pertinent labs from the last 24 hours have been reviewed.    Significant Diagnostics:  I have reviewed all pertinent imaging results/findings within the past 24 hours.    Assessment/Plan:     * Brain mass  Patient is a 77M with PMH of HTN, DM, polio, CAD (on ASA/Coumadin with pacemaker) who presents with multiple falls (including this morning). Patient was transferred from OSH for NSGY for possible brain mass.    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   --CTH with contrast 9/20: interventricular diffuse enhancing mass in lateral vents and 3rd vent   --CT chest/abdo/pelvis 9/21: 1.1 cm solid solitary RUL nodule, additional micronodule, hepatosplenomegaly, nonsepcific lucent lesions in L ilum, L3 and L4   --Cannot do MRI - pacemaker MRI compatible, but leads not   --Repeat CTH w wo con 9/26 shows smaller lesion with less contrast enhancement  --F/u LP results, surgical plan pending  --SBP <160  --Na >135  --Dex 4q6  --PUD PPx while steroid treatment ongoing  --Recommend PT/OT daily  --Continue to monitor clinically, notify NSGY immediately with any changes in neuro status        Bonita Fontaine MD  Neurosurgery  [facility] - Surgery (2nd Fl)

## 2022-09-28 NOTE — ASSESSMENT & PLAN NOTE
Patient's FSGs are controlled on current medication regimen.  Last A1c reviewed-   Lab Results   Component Value Date    HGBA1C 6.7 (H) 09/20/2022     Most recent fingerstick glucose reviewed-   Recent Labs   Lab 09/27/22  1626 09/27/22  2100 09/28/22  0728 09/28/22  1230   POCTGLUCOSE 206* 272* 289* 241*     Current correctional scale  Medium  Maintain anti-hyperglycemic dose as follows-   Antihyperglycemics (From admission, onward)    Start     Stop Route Frequency Ordered    09/28/22 2100  insulin detemir U-100 pen 15 Units         -- SubQ Nightly 09/28/22 1102    09/28/22 1130  insulin aspart U-100 pen 9 Units         -- SubQ 3 times daily with meals 09/28/22 1102    09/20/22 2250  insulin aspart U-100 pen 1-10 Units         -- SubQ Before meals & nightly PRN 09/20/22 2152      Home medications: metformin, pioglitazone, Victoza    -- Hold oral hyperglycemics  -- SSI, POCT BG qACHS; titrate basal/bolus regimen PRN to 140-180 goal

## 2022-09-28 NOTE — PT/OT/SLP PROGRESS
Occupational Therapy Treatment    Patient Name:  Kieran Styles   MRN:  [id number]  Admit Date: 9/20/2022  Admitting Diagnosis:  Brain mass   Length of Stay: 8 days  Recent Surgery: Procedure(s) (LRB):  Lumbar Puncture (N/A) Day of Surgery    Recommendations:     Discharge Recommendations: rehabilitation facility  Discharge Equipment Recommendations:  other (see comments) (TBD)  Barriers to discharge:  None    Plan:     Patient to be seen 3 x/week to address the above listed problems via self-care/home management, therapeutic activities, therapeutic exercises, neuromuscular re-education, cognitive retraining  Plan of Care Expires: 10/22/22  Plan of Care Reviewed with: patient, spouse    Assessment:   Kieran Styles is a 77 y.o. male with a medical diagnosis of Brain mass.  He presents with the following performance deficits affecting function: weakness, impaired endurance, decreased coordination, impaired functional mobility, impaired self care skills, gait instability, decreased upper extremity function, decreased lower extremity function, impaired cognition, impaired balance, decreased safety awareness, pain, decreased ROM, impaired coordination.      Pt tolerated session fairly this date and was willing to participate. Demonstrating continued increased pain, impaired endurance, impaired cognition, impaired balance, increased weakness, impaired coordination, and decreased safety awareness, requiring increased assistance and time to complete functional tasks. Patient with noted improvements this date in stand and step trials following Lumbar puncture, continues to require increased cueing for step sequencing and due to impaired balance. Education to patient and spouse regarding safety during bed>chair transfers. Education to nurse this date that patient is safe for assist x 2 person stand pivot transfers to<>from chair. Patient is progressing towards established goals, and continues to benefit from acute  "skilled OT services to increase functional performance and improve quality of life. OT to continue to recommend Rehab at discharge to improve pt functional independence and increase patient safety before returning home.      Rehab Prognosis: Fair; patient would benefit from acute skilled OT services to address these deficits and reach maximum level of function.        Subjective   Communicated with: Nurse prior to session.  Patient found HOB elevated with bed alarm, telemetry, peripheral IV (Telesitter) upon OT entry to room. Pt agreeable to participate at this time.    Patient: " I don't remember they told me before " -re cueing for not getting up unassisted, patient continues with impaired cognition and decreased safety awareness    Pain/Comfort:  Pain Rating 1: 0/10  Location - Orientation 1: generalized  Location 1: back  Pain Addressed 1: Reposition, Distraction, Cessation of Activity  Pain Rating Post-Intervention 1: 8/10    Objective:   Patient found with: bed alarm, telemetry, peripheral IV (Telesitter)   General Precautions: Standard, Cardiac fall   Orthopedic Precautions:N/A   Braces: N/A   Oxygen Device: Room Air  Vitals: /77   Pulse 68   Temp 97.3 °F (36.3 °C)   Resp 18   Ht 6' (1.829 m)   Wt 86.2 kg (190 lb)   SpO2 (!) 93%   BMI 25.77 kg/m²     Outcome Measures:  [facility] 6 Click ADL: 15    Cognition:   Alert and Cooperative  Command following: follows one-step commands  Communication: clear/fluent    Occupational Performance:  Bed Mobility:    Patient completed Rolling/Turning to Left with  maximal assistance and 2 persons  Patient completed Rolling/Turning to Right with maximal assistance and 2 persons  Patient completed Supine to Sit with maximal assistance and 2 persons on L side of bed ; and right x 2 trials  Scooting anteriorly to EOB to have both feet planted on floor: maximal assistance  Patient completed Sit to Supine with maximal assistance and 2 persons on R side of " bed    Functional Mobility/Transfers:  Static Sitting EOB: CGA-Min A, increased posterior lean  Patient completed Sit <> Stand Transfer from EOB with moderate assistance and of 2 persons  with  hand-held assist   Patient completed second stand from EOB with Min A x 2 using B hand held assist  Static Standing Balance: Max A  Patient completed lateral steps along EOB x 5 to right and to left with Mod A x 2 using B hand-held assist, required increased time, observed impaired LLE step clearance  Patient completed Bed <> Chair Transfer using Stand Pivot technique with moderate assistance and of 2 persons with hand-held assist    Activities of Daily Living:  Declined needs at this time    AMPA 6 Click ADL:  AMPA Total Score: 15    Treatment & Education:  -Pt and spouse education on OT role and POC.  -Importance of E/OOB activity with staff assistance, emphasis on daily participation  -Safety during functional transfer and mobility ensured  -Patient provided with education on importance of Bilateral UB/LB integration during functional tasks for improvement in functional performance.   -Education provided/reviewed, questions answered within OT scope of practice.   -Patient will continue to require reinforcement to demonstrate understanding and learning this date.         Patient left up in chair with all lines intact, call button in reach, chair alarm on, nurse notified, spouse present, and blocked with tray table and telesitter with view of patient    GOALS:   Multidisciplinary Problems       Occupational Therapy Goals          Problem: Occupational Therapy    Goal Priority Disciplines Outcome Interventions   Occupational Therapy Goal     OT, PT/OT Ongoing, Progressing    Description: Goals to be met by: 10/8/22     Patient will increase functional independence with ADLs by performing:    UE Dressing with Set-up Assistance.  Grooming while seated with Supervision.  Toileting from bedside commode with Set-up Assistance  for hygiene and clothing management.                          Time Tracking:     OT Date of Treatment: 09/28/22  OT Start Time: 1455  OT Stop Time: 1518  OT Total Time (min): 23 min    Billable Minutes:Therapeutic Activity 23 9/28/2022

## 2022-09-28 NOTE — H&P
Inpatient Radiology Pre-procedure Note    History of Present Illness:  Kieran Styles is a 77 y.o. male who presents for imaging-guided LP.    Admission H&P reviewed.  Past Medical History:   Diagnosis Date    Coronary artery disease     Diabetes mellitus     GERD (gastroesophageal reflux disease)     Hypertension      Past Surgical History:   Procedure Laterality Date    APPENDECTOMY      CARDIAC SURGERY      JOINT REPLACEMENT         Review of Systems:   As documented in primary team H&P    Home Meds:   Prior to Admission medications    Medication Sig Start Date End Date Taking? Authorizing Provider   acetaminophen (TYLENOL) 325 MG tablet Take 650 mg by mouth every 6 (six) hours as needed for Pain.   Yes Historical Provider   aspirin (ECOTRIN) 81 MG EC tablet Take 1 tablet by mouth once daily.   Yes Historical Provider   atenoloL (TENORMIN) 50 MG tablet Take 50 mg by mouth once daily. 9/10/22  Yes Historical Provider   atorvastatin (LIPITOR) 10 MG tablet Take 10 mg by mouth every evening. 7/17/22  Yes Historical Provider   docusate sodium (COLACE) 250 MG capsule Take 250 mg by mouth 2 (two) times a day.   Yes Historical Provider   furosemide (LASIX) 20 MG tablet Take 20 mg by mouth once daily. 7/12/22  Yes Historical Provider   gabapentin (NEURONTIN) 300 MG capsule Take 300-900 mg by mouth daily   Yes Historical Provider   HYDROcodone-acetaminophen (NORCO)  mg per tablet Take 1 tablet by mouth every 8 (eight) hours as needed for Pain. 8/2/22  Yes Historical Provider   liraglutide 0.6 mg/0.1 mL, 18 mg/3 mL, subq PNIJ (VICTOZA 2-SEDRICK) 0.6 mg/0.1 mL (18 mg/3 mL) PnIj pen Inject 0.6 mg into the skin once daily.   Yes Historical Provider   losartan-hydrochlorothiazide 100-25 mg (HYZAAR) 100-25 mg per tablet Take 1 tablet by mouth once daily. 7/12/22  Yes Historical Provider   metFORMIN (GLUCOPHAGE) 500 MG tablet Take 500 mg by mouth 3 (three) times daily. 9/20/22  Yes Historical Provider   pantoprazole  (PROTONIX) 40 MG tablet Take 40 mg by mouth once daily. 9/10/22  Yes Historical Provider   pioglitazone (ACTOS) 15 MG tablet Take 15 mg by mouth once daily.   Yes Historical Provider   rOPINIRole (REQUIP) 1 MG tablet Take 1 mg by mouth every evening. 8/14/22  Yes Historical Provider   warfarin (COUMADIN) 5 MG tablet Take 5 mg by mouth three days weekly and 7.5 mg on all other days   Yes Historical Provider     Scheduled Meds:    aspirin  81 mg Oral Daily    atenoloL  50 mg Oral Daily    atorvastatin  40 mg Oral QHS    dexamethasone  4 mg Intravenous Q6H    fluticasone furoate-vilanteroL  1 puff Inhalation Daily    furosemide  20 mg Oral Daily    insulin aspart U-100  7 Units Subcutaneous TIDWM    insulin detemir U-100  12 Units Subcutaneous QHS    LIDOcaine  1 patch Transdermal Q24H    losartan-hydrochlorothiazide 100-25 mg  1 tablet Oral Daily    nicotine  1 patch Transdermal Daily    OLANZapine  10 mg Oral QHS    pantoprazole  40 mg Oral Daily    polyethylene glycol  17 g Oral BID    rOPINIRole  1 mg Oral QHS    senna-docusate 8.6-50 mg  2 tablet Oral Daily    sodium phosphate IVPB  15 mmol Intravenous Once     Continuous Infusions:   PRN Meds:acetaminophen, albuterol-ipratropium, aluminum-magnesium hydroxide-simethicone, dextrose 10%, dextrose 10%, glucagon (human recombinant), glucose, glucose, HYDROcodone-acetaminophen, hydrOXYzine HCL, insulin aspart U-100, melatonin, naloxone, OLANZapine, sodium chloride 0.9%, sodium chloride 0.9%  Anticoagulants/Antiplatelets: aspirin (held)    Allergies:   Review of patient's allergies indicates:   Allergen Reactions    Penicillins     Sulfa (sulfonamide antibiotics)      Sedation Hx: have not been any systemic reactions    Labs:  Recent Labs   Lab 09/28/22 0458   INR 1.2       Recent Labs   Lab 09/28/22 0458   WBC 12.85*   HGB 15.0   HCT 46.2   MCV 91         Recent Labs   Lab 09/28/22 0458   *      K 4.5      CO2 26   BUN 31*   CREATININE 1.0    CALCIUM 9.4   MG 2.0   ALT 14   AST 11   ALBUMIN 3.5   BILITOT 0.6         Vitals:  Temp: 97.6 °F (36.4 °C) (09/28/22 0924)  Pulse: 70 (09/28/22 0924)  Resp: 17 (09/28/22 0924)  BP: (!) 151/71 (09/28/22 0924)  SpO2: 96 % (09/28/22 0924)     Physical Exam:  ASA: per anesthesia  Mallampati: per anesthesia    General: no acute distress  Mental Status: alert and oriented to person, place and time  HEENT: normocephalic, atraumatic  Chest: unlabored breathing  Heart: regular heart rate  Abdomen: nondistended  Extremity: moves all extremities    Plan: image-guided LP  Sedation Plan: per anesthesia    Gerda Zavala MD MSCR  PGY-4 Radiology Resident

## 2022-09-28 NOTE — ASSESSMENT & PLAN NOTE
77-year-old male with extensive past medical history presenting from [facility] for Neurosurgery evaluation of a brain mass found on CT head.  Per patient wife, patient has had sudden memory problems in the last 2 weeks with recurrent falls, but without loss of consciousness, seizure activity, head trauma.  Baseline is normal without other limitations.  Patient also has been describing 1 year of headache.  Has seen neurology outpatient, and has been taking gabapentin and undergoing nerve block procedures in neck with little effect. 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     Concerns for metastatic disease, possibly lung as primary given extensive smoking history.    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     Patient's pacemaker leads are NOT compatible with MRI    - negative toxoplasma and RPR    LP (9/28):  - elevated CSF protein, glucose, lymphocytes    Plan:   -- NSGY evaluated, pending recs in regards to surgical intervention  --Dex 4mg q6  --PUD PPx while steroid treatment ongoing, patient on home pantoprazole 40mg qd  --Continue to monitor clinically, notify NSGY immediately with any changes in neuro status  -- ID consulted to r/o infectious etiology of mass   - f/u toxoplasma IgG and IgM, immunodeficiency panel, serum cryptococcus antibody    - IR for fluoro guided LP on 9/28   - f/u CSF labs: cell count with diff, protein, glucose, gram stain, culture, cytology, and flow cytometry   - pulmonary nodule bx can be done outpatient with studies  (pathology, cytology, aerobic/anaerobic/fungal/AFB cultures)  -- zyprexa 10mg nightly for agitation, 5mg BID PRN for breakthrough  -- no immediate surgical intervention planned  -- Neuro check q4h  -- Fall, Aspiration, Delirium precautions

## 2022-09-28 NOTE — ASSESSMENT & PLAN NOTE
Chronic anticoagulation use w/ warfarin  S/P pacemaker with multiple replacements    Home meds: Warfarin 7.5 qd, atenolol 50 qd  [id number]      -- Discontinued home warfarin in the case of NSGY intervention  -- Lovenox bridge while off warfarin   -- h/o stroke while off AC for a past colonoscopy  -- Continue home ASA, statin, and atenolol  -- Daily INR   -- Cardiac telemetry  -- Maintain K > 4, Mg > 2, Ca wnl; replete PRN  -- STAT cardiology consult if hemodynamic instability for DCCV evaluation

## 2022-09-28 NOTE — SUBJECTIVE & OBJECTIVE
Interval History: Patient NPO for LP with IR today. Pending NSGY final recs. Patient still without a BM.    Review of Systems   Constitutional:  Negative for chills and fever.   HENT:  Negative for rhinorrhea, sneezing, sore throat and trouble swallowing.    Respiratory:  Negative for cough and shortness of breath.    Cardiovascular:  Negative for chest pain and palpitations.   Gastrointestinal:  Positive for constipation. Negative for diarrhea, nausea and vomiting.   Genitourinary:  Negative for dysuria, frequency and urgency.   Musculoskeletal:  Positive for arthralgias and back pain. Negative for myalgias.   Skin:  Negative for rash and wound.   Neurological:  Negative for syncope, facial asymmetry, speech difficulty, weakness, light-headedness and headaches.   Psychiatric/Behavioral:  Positive for agitation, confusion and decreased concentration.    Objective:     Vital Signs (Most Recent):  Temp: 97.3 °F (36.3 °C) (09/28/22 1219)  Pulse: 68 (09/28/22 1219)  Resp: 18 (09/28/22 1244)  BP: 135/77 (09/28/22 1219)  SpO2: (!) 93 % (09/28/22 1219) Vital Signs (24h Range):  Temp:  [97.3 °F (36.3 °C)-98.1 °F (36.7 °C)] 97.3 °F (36.3 °C)  Pulse:  [68-74] 68  Resp:  [17-20] 18  SpO2:  [92 %-100 %] 93 %  BP: (127-168)/(59-88) 135/77     Weight: 86.2 kg (190 lb)  Body mass index is 25.77 kg/m².    Intake/Output Summary (Last 24 hours) at 9/28/2022 1540  Last data filed at 9/28/2022 0632  Gross per 24 hour   Intake 360 ml   Output --   Net 360 ml        Physical Exam  Vitals and nursing note reviewed.   Constitutional:       General: He is not in acute distress.     Appearance: He is obese. He is not ill-appearing or diaphoretic.   HENT:      Right Ear: External ear normal.      Left Ear: External ear normal.      Nose: Nose normal.   Eyes:      General:         Right eye: No discharge.         Left eye: No discharge.      Conjunctiva/sclera: Conjunctivae normal.   Cardiovascular:      Rate and Rhythm: Normal rate and  regular rhythm.      Heart sounds: Normal heart sounds. No murmur heard.  Pulmonary:      Effort: Pulmonary effort is normal. No respiratory distress.      Breath sounds: Normal breath sounds. No wheezing or rales.   Abdominal:      General: Abdomen is flat. There is no distension.      Palpations: There is no mass.      Tenderness: There is no abdominal tenderness.   Musculoskeletal:         General: No swelling, tenderness or deformity. Normal range of motion.      Cervical back: Normal range of motion and neck supple.      Right lower leg: No edema.      Left lower leg: No edema.   Skin:     General: Skin is warm.      Coloration: Skin is not jaundiced.      Findings: No bruising.   Neurological:      Mental Status: He is alert.      Cranial Nerves: No cranial nerve deficit.      Motor: Weakness (left sided, LUE weak 2/2 h/o Polio) present.      Comments: Oriented to person and year but not to place  Poor memory recall       Significant Labs: All pertinent labs within the past 24 hours have been reviewed.    Significant Imaging: I have reviewed all pertinent imaging results/findings within the past 24 hours.

## 2022-09-28 NOTE — PLAN OF CARE
Pt has a pacemaker. I called the pacemaker clinic at 0944 and spoke to Tammy. She said since it it just a lumbar puncture, no magnet is required. He is ready for his procedure

## 2022-09-28 NOTE — ASSESSMENT & PLAN NOTE
Patient is a 77M with PMH of HTN, DM, polio, CAD (on ASA/Coumadin with pacemaker) who presents with multiple falls (including this morning). Patient was transferred from OSH for NSGY for possible brain mass.    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   --CTH with contrast 9/20: interventricular diffuse enhancing mass in lateral vents and 3rd vent   --CT chest/abdo/pelvis 9/21: 1.1 cm solid solitary RUL nodule, additional micronodule, hepatosplenomegaly, nonsepcific lucent lesions in L ilum, L3 and L4   --Cannot do MRI - pacemaker MRI compatible, but leads not   --Repeat CTH w wo con 9/26 shows smaller lesion with less contrast enhancement  --F/u LP results, surgical plan pending  --SBP <160  --Na >135  --Dex 4q6  --PUD PPx while steroid treatment ongoing  --Recommend PT/OT daily  --Continue to monitor clinically, notify NSGY immediately with any changes in neuro status

## 2022-09-28 NOTE — PROGRESS NOTES
"[facility] - Neurosurgery ([facility])  [facility]  Progress Note    Patient Name: Kieran Styles  MRN: [id number]  Patient Class: IP- Inpatient   Admission Date: 9/20/2022  Length of Stay: 8 days  Attending Physician: Edy Fried MD  Primary Care Provider: Sony Holt DO        Subjective:     Principal Problem:Brain mass        HPI:  78 yo PMHx DM2, CAD s/p CABG x 2 1997, history of adenomatous colon polyps (07/2017) revealing 10 mm sessile serrated adenoma, GERD, multiple pacemaker replacements most recent 2021 for AF (on warfarin), HTN, HLD, chronic back pain, MARQUIS on CPAP presenting to [facility] ED from [facility] due to a 2 week history of sudden memory deficits, disorientation, confsuion (date, situation, short-term memory), and multiple falls w/o head trauma or LOC or seizure. Wife noticed that he was forgetting where he was, what he had been doing. In addition, he has been getting weaker and more unstable on his feet. Yesterday evening, he fell while going to the bathroom and hit his elbow. Transported to EMS to [facility], where they did a CT head 09/20 this AM. Was discharged from [facility] because they initially told the wife that the CT scan was "clear", wife brought him to PCP and cardiologist OP office later that morning, but was called back by [facility] stating that they had mis-read the CT head and that the CT head was remarkable for apparent mass in brain.  Patient was subsequently transferred via EMS to [facility].  [facility] staff informed the wife that they were concerned for possible cancer, and wanted him to see neurosurgery evaluation.  Patient wife denies history cancer to her knowledge.  Patient is an active smoker smoking 1-2 packs per day, and has smoked for the last 30 years.  Social alcohol use, no other illicit substances.    On my initial evaluation, patient is conversational; however, he is only oriented to " self and time.  He exhibits short-term memory loss, and is not sure why he is here.  Overall, patient is a poor historian.  Family was not at bedside.  This history of present illness was obtained via telephone conversation with his wife ([phone] Donna Styles).  ROS includes denies chest pain, abdominal pain, nausea, vomiting, shortness of breath, cough, diarrhea, constipation, blood per rectum, fevers, chills, muscle aches, new weakness, new sensory changes.  Patient reports history of chronic lower back pain.    ED course:  Neurosurgery evaluated at [facility], concerns for metastasis.  No plans for surgical intervention at this time.  Subsequently pan CT scan pending.    PMHx/PSHx:  DM2, CAD s/p CABG x 2 , multiple pacemaker replacements most recent  for AF (on warfarin), HTN, HLD, chronic back pain, MARQUIS on CPAP    FMHx: Mother with breast cancer, . Sister NAFLD w/ cirrhosis, .      Overview/Hospital Course:  Patient presenting with sudden memory deficits, AMS, and multiple falls who was transferred from [facility] to [facility] [place] NSGY evaluation of the CT head () finding of a brain mass concerning for cancer. No plans for surgical intervention at this time. CT A/P with con demonstrated a pulmonary nodule, bony lesions in left ilium, L3 and L4 vertebral bodies concerning for metastasis. Patient's pacemaker is compatible with MRI; however, the leads are not therefore patient cannot undergo MRI. NSGY palnning for CT head on  to assess for interval change. ID consulted and believes brain mass with pulmonary nodule is likely 2/2 malignancy given h/o significant smoking and recommended IR bx of pulmonary nodule in addition to further infectious w/u. LP with IR under fluoro successfully completed on , will f/u CSF studies. IR recommended pulmonary nodule bx outpatient.        Interval History: Patient NPO for LP with IR today. Pending NSGY final recs. Patient still without a  BM.    Review of Systems   Constitutional:  Negative for chills and fever.   HENT:  Negative for rhinorrhea, sneezing, sore throat and trouble swallowing.    Respiratory:  Negative for cough and shortness of breath.    Cardiovascular:  Negative for chest pain and palpitations.   Gastrointestinal:  Positive for constipation. Negative for diarrhea, nausea and vomiting.   Genitourinary:  Negative for dysuria, frequency and urgency.   Musculoskeletal:  Positive for arthralgias and back pain. Negative for myalgias.   Skin:  Negative for rash and wound.   Neurological:  Negative for syncope, facial asymmetry, speech difficulty, weakness, light-headedness and headaches.   Psychiatric/Behavioral:  Positive for agitation, confusion and decreased concentration.    Objective:     Vital Signs (Most Recent):  Temp: 97.3 °F (36.3 °C) (09/28/22 1219)  Pulse: 68 (09/28/22 1219)  Resp: 18 (09/28/22 1244)  BP: 135/77 (09/28/22 1219)  SpO2: (!) 93 % (09/28/22 1219) Vital Signs (24h Range):  Temp:  [97.3 °F (36.3 °C)-98.1 °F (36.7 °C)] 97.3 °F (36.3 °C)  Pulse:  [68-74] 68  Resp:  [17-20] 18  SpO2:  [92 %-100 %] 93 %  BP: (127-168)/(59-88) 135/77     Weight: 86.2 kg (190 lb)  Body mass index is 25.77 kg/m².    Intake/Output Summary (Last 24 hours) at 9/28/2022 1540  Last data filed at 9/28/2022 0632  Gross per 24 hour   Intake 360 ml   Output --   Net 360 ml        Physical Exam  Vitals and nursing note reviewed.   Constitutional:       General: He is not in acute distress.     Appearance: He is obese. He is not ill-appearing or diaphoretic.   HENT:      Right Ear: External ear normal.      Left Ear: External ear normal.      Nose: Nose normal.   Eyes:      General:         Right eye: No discharge.         Left eye: No discharge.      Conjunctiva/sclera: Conjunctivae normal.   Cardiovascular:      Rate and Rhythm: Normal rate and regular rhythm.      Heart sounds: Normal heart sounds. No murmur heard.  Pulmonary:      Effort: Pulmonary  effort is normal. No respiratory distress.      Breath sounds: Normal breath sounds. No wheezing or rales.   Abdominal:      General: Abdomen is flat. There is no distension.      Palpations: There is no mass.      Tenderness: There is no abdominal tenderness.   Musculoskeletal:         General: No swelling, tenderness or deformity. Normal range of motion.      Cervical back: Normal range of motion and neck supple.      Right lower leg: No edema.      Left lower leg: No edema.   Skin:     General: Skin is warm.      Coloration: Skin is not jaundiced.      Findings: No bruising.   Neurological:      Mental Status: He is alert.      Cranial Nerves: No cranial nerve deficit.      Motor: Weakness (left sided, LUE weak 2/2 h/o Polio) present.      Comments: Oriented to person and year but not to place  Poor memory recall       Significant Labs: All pertinent labs within the past 24 hours have been reviewed.    Significant Imaging: I have reviewed all pertinent imaging results/findings within the past 24 hours.      Assessment/Plan:      * Brain mass  77-year-old male with extensive past medical history presenting from [facility] for Neurosurgery evaluation of a brain mass found on CT head.  Per patient wife, patient has had sudden memory problems in the last 2 weeks with recurrent falls, but without loss of consciousness, seizure activity, head trauma.  Baseline is normal without other limitations.  Patient also has been describing 1 year of headache.  Has seen neurology outpatient, and has been taking gabapentin and undergoing nerve block procedures in neck with little effect. 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     Concerns for metastatic disease, possibly lung as primary given extensive smoking history.    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     Patient's pacemaker leads are NOT compatible with MRI    - negative toxoplasma and RPR    LP (9/28):  - elevated CSF protein, glucose, lymphocytes    Plan:   -- NSGY evaluated, pending recs in regards to surgical intervention  --Dex 4mg q6  --PUD PPx while steroid treatment ongoing, patient on home pantoprazole 40mg qd  --Continue to monitor clinically, notify NSGY immediately with any changes in neuro status  -- ID consulted to r/o infectious etiology of mass   - f/u toxoplasma IgG and IgM, immunodeficiency panel, serum cryptococcus antibody    - IR for fluoro guided LP on 9/28   - f/u CSF labs: cell count with diff, protein, glucose, gram stain, culture, cytology, and flow cytometry   - pulmonary nodule bx can be done outpatient with studies (pathology, cytology, aerobic/anaerobic/fungal/AFB cultures)  -- zyprexa 10mg nightly for agitation, 5mg BID PRN for breakthrough  -- no immediate surgical intervention planned  -- Neuro check q4h  -- Fall, Aspiration, Delirium precautions    Constipation  - advanced bowel regiment to miralax and doc-senna to twice daily  - if not successful in causing bowel movement, will consider enema      Dermatitis associated with moisture  Wound care consulted, appreciate recs      COPD (chronic obstructive pulmonary disease)  On Anoro. Rx of singular but not taking.    -- Continue formulary equivalent of inhaler  -- PRN duoneb  -- SpO2 goal 88-92       Restless leg  -- Continue home ropinirole       GERD (gastroesophageal reflux disease)  -- Continue home PPI      Chronic back pain  -- MM pain regimen PRN   Tylenol PRN   Home Narco PRN   Lidocaine  patches      Hypertension  Home medications: atenolol 50 qd, losartan-HCTZ 100-25 mg qd    -- Continue home medications as tolerated      MARQUIS on CPAP  -- Maintain nightly CPAP  -- Patient wife to bring home CPAP      Pacemaker  Pacemaker is compatible with MRI but the leads are NOT compatible with MRI      Atrial fibrillation  Chronic anticoagulation use w/ warfarin  S/P pacemaker with multiple replacements    Home meds: Warfarin 7.5 qd, atenolol 50 qd  [id number] 8      -- Discontinued home warfarin in the case of NSGY intervention  -- Lovenox bridge while off warfarin   -- h/o stroke while off AC for a past colonoscopy  -- Continue home ASA, statin, and atenolol  -- Daily INR   -- Cardiac telemetry  -- Maintain K > 4, Mg > 2, Ca wnl; replete PRN  -- STAT cardiology consult if hemodynamic instability for DCCV evaluation        Hyperlipidemia  - See CAD involving coronary bypass graft      Coronary artery disease involving coronary bypass graft  CABG x 2 1997  Dyslipidemia    -- Continue ASA 81  -- Continue HI statin    Type 2 diabetes mellitus, without long-term current use of insulin  Patient's FSGs are controlled on current medication regimen.  Last A1c reviewed-   Lab Results   Component Value Date    HGBA1C 6.7 (H) 09/20/2022     Most recent fingerstick glucose reviewed-   Recent Labs   Lab 09/27/22  1626 09/27/22  2100 09/28/22  0728 09/28/22  1230   POCTGLUCOSE 206* 272* 289* 241*     Current correctional scale  Medium  Maintain anti-hyperglycemic dose as follows-   Antihyperglycemics (From admission, onward)    Start     Stop Route Frequency Ordered    09/28/22 2100  insulin detemir U-100 pen 15 Units         -- SubQ Nightly 09/28/22 1102    09/28/22 1130  insulin aspart U-100 pen 9 Units         -- SubQ 3 times daily with meals 09/28/22 1102    09/20/22 2250  insulin aspart U-100 pen 1-10 Units         -- SubQ Before meals & nightly PRN 09/20/22 2152      Home medications: metformin, pioglitazone,  Victoza    -- Hold oral hyperglycemics  -- SSI, POCT BG qACHS; titrate basal/bolus regimen PRN to 140-180 goal        VTE Risk Mitigation (From admission, onward)         Ordered     enoxaparin injection 90 mg  2 times daily         09/28/22 1503     Reason for No Pharmacological VTE Prophylaxis  Once        Question:  Reasons:  Answer:  Already adequately anticoagulated on oral Anticoagulants    09/20/22 2152     IP VTE HIGH RISK PATIENT  Once         09/20/22 2152     Place sequential compression device  Until discontinued         09/20/22 2039                Discharge Planning   ANITA: 9/30/2022     Code Status: Full Code   Is the patient medically ready for discharge?: No    Reason for patient still in hospital (select all that apply): Patient trending condition, Laboratory test, Treatment and Consult recommendations  Discharge Plan A: Rehab   Discharge Delays: None known at this time              Sarah Llanos MD  Department of Hospital Medicine   [facility] Neurosurgery ([facility])

## 2022-09-28 NOTE — PROGRESS NOTES
Patient has been identified as having an implanted cardiac rhythm device (CRD); the implanted device is a [employer] pacemaker      No noted pacer dependency.           Per protocol, no pacemaker reprogramming is required in this non-dependent patient.      For additional questions, please contact the Arrhythmia Department at Ext [phone].

## 2022-09-28 NOTE — ANESTHESIA PROCEDURE NOTES
Intubation    Date/Time: 9/28/2022 10:36 AM  Performed by: Elda Maurer CRNA  Authorized by: Elda Maurer CRNA     Intubation:     Induction:  Intravenous    Intubated:  Postinduction    Mask Ventilation:  Easy mask    Attempts:  1    Attempted By:  CRNA    Method of Intubation:  Direct    Blade:  Butler 2    Laryngeal View Grade: Grade I - full view of cords      Difficult Airway Encountered?: No      Complications:  None    Airway Device:  Oral endotracheal tube    Airway Device Size:  7.5    Style/Cuff Inflation:  Cuffed    Inflation Amount (mL):  6    Tube secured:  22    Secured at:  The lips    Placement Verified By:  Capnometry and Revisualization with laryngoscopy    Complicating Factors:  None    Findings Post-Intubation:  BS equal bilateral and atraumatic/condition of teeth unchanged

## 2022-09-28 NOTE — SUBJECTIVE & OBJECTIVE
Interval History: 9/28: Neuro stable. AFVSS. LP with anesthesia yesterday unsuccessful. Pending LP with IR today.    Medications:  Continuous Infusions:  Scheduled Meds:   aspirin  81 mg Oral Daily    atenoloL  50 mg Oral Daily    atorvastatin  40 mg Oral QHS    dexamethasone  4 mg Intravenous Q6H    fluticasone furoate-vilanteroL  1 puff Inhalation Daily    furosemide  20 mg Oral Daily    insulin aspart U-100  7 Units Subcutaneous TIDWM    insulin detemir U-100  12 Units Subcutaneous QHS    LIDOcaine  1 patch Transdermal Q24H    losartan-hydrochlorothiazide 100-25 mg  1 tablet Oral Daily    nicotine  1 patch Transdermal Daily    OLANZapine  10 mg Oral QHS    pantoprazole  40 mg Oral Daily    polyethylene glycol  17 g Oral BID    rOPINIRole  1 mg Oral QHS    senna-docusate 8.6-50 mg  2 tablet Oral Daily    sodium phosphate IVPB  15 mmol Intravenous Once     PRN Meds:acetaminophen, albuterol-ipratropium, aluminum-magnesium hydroxide-simethicone, dextrose 10%, dextrose 10%, glucagon (human recombinant), glucose, glucose, HYDROcodone-acetaminophen, hydrOXYzine HCL, insulin aspart U-100, melatonin, naloxone, OLANZapine, sodium chloride 0.9%, sodium chloride 0.9%     Review of Systems  Objective:     Weight: 86.2 kg (190 lb)  Body mass index is 25.77 kg/m².  Vital Signs (Most Recent):  Temp: 97.6 °F (36.4 °C) (09/28/22 0924)  Pulse: 70 (09/28/22 0924)  Resp: 17 (09/28/22 0924)  BP: (!) 151/71 (09/28/22 0924)  SpO2: 96 % (09/28/22 0924) Vital Signs (24h Range):  Temp:  [96.3 °F (35.7 °C)-98.1 °F (36.7 °C)] 97.6 °F (36.4 °C)  Pulse:  [69-74] 70  Resp:  [17-20] 17  SpO2:  [92 %-96 %] 96 %  BP: (127-162)/(59-77) 151/71                            Physical Exam    Neurosurgery Physical Exam  General: AOx2, GCS E4V4M6, higher order confusion  CNII-XII: Intact on fine exam, PERRL, visual fields grossly intact, EOMI, facial sensation preserved, no facial asymmetry, tongue midline, shoulder shrug equal, no pronator  drift  Extremities: LUE contracted at baseline (4/5 motor), 5/5 motor otherwise, sensorium intact throughout, coordination intact throughout, DTRs 2+, no pathological reflexes, no sensory level present    Significant Labs:  Recent Labs   Lab 09/27/22 0618 09/28/22 0458   * 352*    138   K 4.2 4.5    102   CO2 24 26   BUN 26* 31*   CREATININE 0.9 1.0   CALCIUM 9.4 9.4   MG 2.0 2.0       Recent Labs   Lab 09/27/22 0618 09/28/22 0458   WBC 11.87 12.85*   HGB 14.9 15.0   HCT 46.4 46.2    208       Recent Labs   Lab 09/27/22 0618 09/28/22 0458   INR 1.2 1.2       Microbiology Results (last 7 days)       Procedure Component Value Units Date/Time    Blood culture [[id number]] Collected: 09/23/22 1546    Order Status: Completed Specimen: Blood from Peripheral, Left Arm Updated: 09/27/22 1812     Blood Culture, Routine No Growth to date      No Growth to date      No Growth to date      No Growth to date      No Growth to date    Blood culture [[id number]] Collected: 09/23/22 1546    Order Status: Completed Specimen: Blood from Peripheral, Right Arm Updated: 09/27/22 1812     Blood Culture, Routine No Growth to date      No Growth to date      No Growth to date      No Growth to date      No Growth to date    Gram stain [[id number]]     Order Status: No result Specimen: CSF (Spinal Fluid) from CSF Tap, Tube 3     CSF culture [[id number]]     Order Status: No result Specimen: CSF (Spinal Fluid) from CSF Tap, Tube 3           All pertinent labs from the last 24 hours have been reviewed.    Significant Diagnostics:  I have reviewed all pertinent imaging results/findings within the past 24 hours.

## 2022-09-28 NOTE — ANESTHESIA PREPROCEDURE EVALUATION
09/28/2022    Pre-operative evaluation for Procedure(s) (LRB):  Lumbar Puncture (N/A)    Kieran Styles is a 77 y.o. male w/ DM2 (a1c 6.7), CAD s/p CABG (1997), AF (s/p PPM, on coumadin outpt but transitioned to lovenox while inpatient, last dose on 9/24), MARQUIS (CPAP) and new concern for intraventricular soft tissue enhancement involving the bilateral lateral ventricles and 3rd ventricle (will review imaging with radiology), concerning for CNS lymphoma v infectious/inflammatory etiology. Presents for the above procedure.        Patient Active Problem List   Diagnosis    Brain mass    Type 2 diabetes mellitus, without long-term current use of insulin    Hx of CABG    Coronary artery disease involving coronary bypass graft    Hyperlipidemia    Atrial fibrillation    Pacemaker    MARQUIS on CPAP    Hypertension    Chronic back pain    GERD (gastroesophageal reflux disease)    Adenomatous polyp of colon    Restless leg    COPD (chronic obstructive pulmonary disease)    Dermatitis associated with moisture    Constipation       Review of patient's allergies indicates:   Allergen Reactions    Penicillins     Sulfa (sulfonamide antibiotics)         No current facility-administered medications on file prior to encounter.     Current Outpatient Medications on File Prior to Encounter   Medication Sig Dispense Refill    acetaminophen (TYLENOL) 325 MG tablet Take 650 mg by mouth every 6 (six) hours as needed for Pain.      aspirin (ECOTRIN) 81 MG EC tablet Take 1 tablet by mouth once daily.      atenoloL (TENORMIN) 50 MG tablet Take 50 mg by mouth once daily.      atorvastatin (LIPITOR) 10 MG tablet Take 10 mg by mouth every evening.      docusate sodium (COLACE) 250 MG capsule Take 250 mg by mouth 2 (two) times a day.      furosemide (LASIX) 20 MG tablet Take 20 mg by mouth once daily.       gabapentin (NEURONTIN) 300 MG capsule Take 300-900 mg by mouth daily      HYDROcodone-acetaminophen (NORCO)  mg per tablet Take 1 tablet by mouth every 8 (eight) hours as needed for Pain.      liraglutide 0.6 mg/0.1 mL, 18 mg/3 mL, subq PNIJ (VICTOZA 2-SEDRICK) 0.6 mg/0.1 mL (18 mg/3 mL) PnIj pen Inject 0.6 mg into the skin once daily.      losartan-hydrochlorothiazide 100-25 mg (HYZAAR) 100-25 mg per tablet Take 1 tablet by mouth once daily.      metFORMIN (GLUCOPHAGE) 500 MG tablet Take 500 mg by mouth 3 (three) times daily.      pantoprazole (PROTONIX) 40 MG tablet Take 40 mg by mouth once daily.      pioglitazone (ACTOS) 15 MG tablet Take 15 mg by mouth once daily.      rOPINIRole (REQUIP) 1 MG tablet Take 1 mg by mouth every evening.      warfarin (COUMADIN) 5 MG tablet Take 5 mg by mouth three days weekly and 7.5 mg on all other days         Past Surgical History:   Procedure Laterality Date    APPENDECTOMY      CARDIAC SURGERY      JOINT REPLACEMENT         Social History     Socioeconomic History    Marital status:    Tobacco Use    Smoking status: Every Day     Packs/day: 1.00     Years: 30.00     Pack years: 30.00     Types: Cigarettes    Smokeless tobacco: Never   Substance and Sexual Activity    Alcohol use: Never    Drug use: Never         Vital Signs Range (Last 24H):  Temp:  [35.7 °C (96.3 °F)-36.7 °C (98.1 °F)]   Pulse:  [69-74]   Resp:  [18-20]   BP: (127-162)/(59-77)   SpO2:  [92 %-95 %]       CBC:   Recent Labs     09/27/22  0618 09/28/22  0458   WBC 11.87 12.85*   RBC 5.00 5.08   HGB 14.9 15.0   HCT 46.4 46.2    208   MCV 93 91   MCH 29.8 29.5   MCHC 32.1 32.5       CMP:   Recent Labs     09/27/22  0618 09/28/22  0458    138   K 4.2 4.5    102   CO2 24 26   BUN 26* 31*   CREATININE 0.9 1.0   * 352*   MG 2.0 2.0   PHOS 2.3* 2.5*   CALCIUM 9.4 9.4   ALBUMIN 3.7 3.5   PROT 7.1 6.9   ALKPHOS 53* 62   ALT 9* 14   AST 8* 11   BILITOT 0.6 0.6        INR  Recent Labs     09/26/22  0905 09/27/22  0618 09/28/22  0458   INR 1.2 1.2 1.2       EKG (9/20/22):    Vent. Rate : 082 BPM     Atrial Rate : 326 BPM      P-R Int : 000 ms          QRS Dur : 074 ms       QT Int : 366 ms       P-R-T Axes : 000 031 221 degrees      QTc Int : 427 ms     Atrial flutter with variable A-V block   Low voltage QRS   ST and T wave abnormality, consider anterolateral ischemia   Abnormal ECG   No previous ECGs available         Pre-op Assessment    I have reviewed the Patient Summary Reports.     I have reviewed the Nursing Notes. I have reviewed the NPO Status.      Review of Systems  Cardiovascular:   Hypertension CAD  CABG/stent     Pulmonary:   COPD Sleep Apnea    Renal/:  Renal/ Normal     Hepatic/GI:   GERD    Endocrine:   Diabetes        Physical Exam  General: Oriented x1  Airway:  Mallampati: III   Mouth Opening: Normal  TM Distance: Normal  Tongue: Normal    Dental:  Periodontal disease, Edentulous    Chest/Lungs:  Clear to auscultation, Normal Respiratory Rate    Heart:  Rate: Normal  Rhythm: Regular Rhythm        Anesthesia Plan  Type of Anesthesia, risks & benefits discussed:    Anesthesia Type: Gen ETT  Intra-op Monitoring Plan: Standard ASA Monitors  Induction:  IV  Airway Plan: Video and Direct  Informed Consent: Informed consent signed with the Patient and all parties understand the risks and agree with anesthesia plan.  All questions answered.   ASA Score: 3  Day of Surgery Review of History & Physical: H&P Update referred to the surgeon/provider.    Ready For Surgery From Anesthesia Perspective.     .

## 2022-09-28 NOTE — PROCEDURES
Radiology Post-Procedure Note    Pre Op Diagnosis: brain lesion    Post Op Diagnosis: Same    Procedure: lumbar puncture    Procedure performed by: Gerda Zavala    Written Informed Consent Obtained: yes (from wife)    Specimen Removed: 15 mL CSF    Estimated Blood Loss: Minimal    Findings: Following written informed consent and sterile prep and drape, a 22 gauge spinal needle was inserted at L4 - L5 intralaminar space under fluoroscopic surveillance.  15 mL clear CSF removed and sent to the lab for further analysis.  There were no complications.    Patient tolerated procedure well.    Gerda Zavala MD MSCR  PGY-4 Radiology Resident

## 2022-09-29 LAB
ALBUMIN SERPL BCP-MCNC: 3.5 G/DL (ref 3.5–5.2)
ALP SERPL-CCNC: 51 U/L (ref 55–135)
ALT SERPL W/O P-5'-P-CCNC: 13 U/L (ref 10–44)
ANION GAP SERPL CALC-SCNC: 12 MMOL/L (ref 8–16)
AST SERPL-CCNC: 11 U/L (ref 10–40)
BASOPHILS # BLD AUTO: 0.07 K/UL (ref 0–0.2)
BASOPHILS NFR BLD: 0.5 % (ref 0–1.9)
BILIRUB SERPL-MCNC: 0.7 MG/DL (ref 0.1–1)
BUN SERPL-MCNC: 28 MG/DL (ref 8–23)
CALCIUM SERPL-MCNC: 9.6 MG/DL (ref 8.7–10.5)
CHLORIDE SERPL-SCNC: 103 MMOL/L (ref 95–110)
CO2 SERPL-SCNC: 22 MMOL/L (ref 23–29)
CREAT SERPL-MCNC: 0.9 MG/DL (ref 0.5–1.4)
DIFFERENTIAL METHOD: ABNORMAL
EOSINOPHIL # BLD AUTO: 0 K/UL (ref 0–0.5)
EOSINOPHIL NFR BLD: 0.2 % (ref 0–8)
ERYTHROCYTE [DISTWIDTH] IN BLOOD BY AUTOMATED COUNT: 15.7 % (ref 11.5–14.5)
EST. GFR  (NO RACE VARIABLE): >60 ML/MIN/1.73 M^2
FLOW CYTOMETRY ANTIBODIES ANALYZED - CSF: NORMAL
FLOW CYTOMETRY COMMENT - CSF: NORMAL
FLOW CYTOMETRY INTERPRETATION - CSF: NORMAL
GLUCOSE SERPL-MCNC: 235 MG/DL (ref 70–110)
HCT VFR BLD AUTO: 48.4 % (ref 40–54)
HGB BLD-MCNC: 16 G/DL (ref 14–18)
IMM GRANULOCYTES # BLD AUTO: 0.34 K/UL (ref 0–0.04)
IMM GRANULOCYTES NFR BLD AUTO: 2.3 % (ref 0–0.5)
INR PPP: 1.2 (ref 0.8–1.2)
LYMPHOCYTES # BLD AUTO: 1.2 K/UL (ref 1–4.8)
LYMPHOCYTES NFR BLD: 7.8 % (ref 18–48)
MAGNESIUM SERPL-MCNC: 1.9 MG/DL (ref 1.6–2.6)
MCH RBC QN AUTO: 29.5 PG (ref 27–31)
MCHC RBC AUTO-ENTMCNC: 33.1 G/DL (ref 32–36)
MCV RBC AUTO: 89 FL (ref 82–98)
MONOCYTES # BLD AUTO: 1 K/UL (ref 0.3–1)
MONOCYTES NFR BLD: 6.4 % (ref 4–15)
NEUTROPHILS # BLD AUTO: 12.3 K/UL (ref 1.8–7.7)
NEUTROPHILS NFR BLD: 82.8 % (ref 38–73)
NRBC BLD-RTO: 0 /100 WBC
PHOSPHATE SERPL-MCNC: 3.6 MG/DL (ref 2.7–4.5)
PLATELET # BLD AUTO: 198 K/UL (ref 150–450)
PMV BLD AUTO: 11.6 FL (ref 9.2–12.9)
POCT GLUCOSE: 253 MG/DL (ref 70–110)
POCT GLUCOSE: 257 MG/DL (ref 70–110)
POCT GLUCOSE: 300 MG/DL (ref 70–110)
POCT GLUCOSE: 304 MG/DL (ref 70–110)
POCT GLUCOSE: 316 MG/DL (ref 70–110)
POTASSIUM SERPL-SCNC: 4.3 MMOL/L (ref 3.5–5.1)
PROT SERPL-MCNC: 6.7 G/DL (ref 6–8.4)
PROTHROMBIN TIME: 12.1 SEC (ref 9–12.5)
RBC # BLD AUTO: 5.42 M/UL (ref 4.6–6.2)
SODIUM SERPL-SCNC: 137 MMOL/L (ref 136–145)
WBC # BLD AUTO: 14.88 K/UL (ref 3.9–12.7)

## 2022-09-29 PROCEDURE — 94761 N-INVAS EAR/PLS OXIMETRY MLT: CPT

## 2022-09-29 PROCEDURE — 97116 GAIT TRAINING THERAPY: CPT

## 2022-09-29 PROCEDURE — 83735 ASSAY OF MAGNESIUM: CPT

## 2022-09-29 PROCEDURE — 94660 CPAP INITIATION&MGMT: CPT

## 2022-09-29 PROCEDURE — S4991 NICOTINE PATCH NONLEGEND: HCPCS

## 2022-09-29 PROCEDURE — 99233 SBSQ HOSP IP/OBS HIGH 50: CPT | Mod: GC,,, | Performed by: HOSPITALIST

## 2022-09-29 PROCEDURE — 63600175 PHARM REV CODE 636 W HCPCS

## 2022-09-29 PROCEDURE — 99232 SBSQ HOSP IP/OBS MODERATE 35: CPT | Mod: ,,, | Performed by: NEUROLOGICAL SURGERY

## 2022-09-29 PROCEDURE — 85025 COMPLETE CBC W/AUTO DIFF WBC: CPT

## 2022-09-29 PROCEDURE — 99233 PR SUBSEQUENT HOSPITAL CARE,LEVL III: ICD-10-PCS | Mod: GC,,, | Performed by: HOSPITALIST

## 2022-09-29 PROCEDURE — 99900035 HC TECH TIME PER 15 MIN (STAT)

## 2022-09-29 PROCEDURE — 25000003 PHARM REV CODE 250

## 2022-09-29 PROCEDURE — 94640 AIRWAY INHALATION TREATMENT: CPT

## 2022-09-29 PROCEDURE — 11000001 HC ACUTE MED/SURG PRIVATE ROOM

## 2022-09-29 PROCEDURE — 99232 PR SUBSEQUENT HOSPITAL CARE,LEVL II: ICD-10-PCS | Mod: ,,, | Performed by: NEUROLOGICAL SURGERY

## 2022-09-29 PROCEDURE — 36415 COLL VENOUS BLD VENIPUNCTURE: CPT

## 2022-09-29 PROCEDURE — 84100 ASSAY OF PHOSPHORUS: CPT

## 2022-09-29 PROCEDURE — 85610 PROTHROMBIN TIME: CPT

## 2022-09-29 PROCEDURE — 97530 THERAPEUTIC ACTIVITIES: CPT

## 2022-09-29 PROCEDURE — 80053 COMPREHEN METABOLIC PANEL: CPT

## 2022-09-29 RX ORDER — DEXAMETHASONE SODIUM PHOSPHATE 4 MG/ML
4 INJECTION, SOLUTION INTRA-ARTICULAR; INTRALESIONAL; INTRAMUSCULAR; INTRAVENOUS; SOFT TISSUE EVERY 12 HOURS
Status: COMPLETED | OUTPATIENT
Start: 2022-09-30 | End: 2022-10-01

## 2022-09-29 RX ORDER — INSULIN ASPART 100 [IU]/ML
12 INJECTION, SOLUTION INTRAVENOUS; SUBCUTANEOUS
Status: DISCONTINUED | OUTPATIENT
Start: 2022-09-29 | End: 2022-10-01 | Stop reason: HOSPADM

## 2022-09-29 RX ORDER — OLANZAPINE 2.5 MG/1
10 TABLET ORAL NIGHTLY
Status: DISCONTINUED | OUTPATIENT
Start: 2022-09-29 | End: 2022-10-01

## 2022-09-29 RX ORDER — LACTULOSE 10 G/15ML
20 SOLUTION ORAL 3 TIMES DAILY
Status: DISPENSED | OUTPATIENT
Start: 2022-09-29 | End: 2022-09-30

## 2022-09-29 RX ORDER — BISACODYL 10 MG
10 SUPPOSITORY, RECTAL RECTAL DAILY
Status: DISCONTINUED | OUTPATIENT
Start: 2022-09-29 | End: 2022-10-01 | Stop reason: HOSPADM

## 2022-09-29 RX ADMIN — SENNOSIDES AND DOCUSATE SODIUM 2 TABLET: 50; 8.6 TABLET ORAL at 08:09

## 2022-09-29 RX ADMIN — ATORVASTATIN CALCIUM 40 MG: 40 TABLET, FILM COATED ORAL at 09:09

## 2022-09-29 RX ADMIN — FLUTICASONE FUROATE AND VILANTEROL TRIFENATATE 1 PUFF: 100; 25 POWDER RESPIRATORY (INHALATION) at 07:09

## 2022-09-29 RX ADMIN — Medication 1 PATCH: at 08:09

## 2022-09-29 RX ADMIN — ATENOLOL 50 MG: 25 TABLET ORAL at 08:09

## 2022-09-29 RX ADMIN — INSULIN ASPART 9 UNITS: 100 INJECTION, SOLUTION INTRAVENOUS; SUBCUTANEOUS at 08:09

## 2022-09-29 RX ADMIN — INSULIN ASPART 6 UNITS: 100 INJECTION, SOLUTION INTRAVENOUS; SUBCUTANEOUS at 04:09

## 2022-09-29 RX ADMIN — LOSARTAN POTASSIUM AND HYDROCHLOROTHIAZIDE 1 TABLET: 100; 25 TABLET, FILM COATED ORAL at 08:09

## 2022-09-29 RX ADMIN — INSULIN ASPART 4 UNITS: 100 INJECTION, SOLUTION INTRAVENOUS; SUBCUTANEOUS at 08:09

## 2022-09-29 RX ADMIN — DEXAMETHASONE SODIUM PHOSPHATE 4 MG: 4 INJECTION INTRA-ARTICULAR; INTRALESIONAL; INTRAMUSCULAR; INTRAVENOUS; SOFT TISSUE at 12:09

## 2022-09-29 RX ADMIN — HYDROCODONE BITARTRATE AND ACETAMINOPHEN 1 TABLET: 10; 325 TABLET ORAL at 08:09

## 2022-09-29 RX ADMIN — OLANZAPINE 10 MG: 2.5 TABLET, FILM COATED ORAL at 05:09

## 2022-09-29 RX ADMIN — DEXAMETHASONE SODIUM PHOSPHATE 4 MG: 4 INJECTION INTRA-ARTICULAR; INTRALESIONAL; INTRAMUSCULAR; INTRAVENOUS; SOFT TISSUE at 06:09

## 2022-09-29 RX ADMIN — INSULIN ASPART 9 UNITS: 100 INJECTION, SOLUTION INTRAVENOUS; SUBCUTANEOUS at 12:09

## 2022-09-29 RX ADMIN — Medication 1 ENEMA: at 05:09

## 2022-09-29 RX ADMIN — ASPIRIN 81 MG: 81 TABLET, COATED ORAL at 08:09

## 2022-09-29 RX ADMIN — ENOXAPARIN SODIUM 90 MG: 100 INJECTION SUBCUTANEOUS at 08:09

## 2022-09-29 RX ADMIN — LIDOCAINE 1 PATCH: 50 PATCH CUTANEOUS at 12:09

## 2022-09-29 RX ADMIN — ROPINIROLE HYDROCHLORIDE 1 MG: 1 TABLET, FILM COATED ORAL at 09:09

## 2022-09-29 RX ADMIN — BISACODYL 10 MG: 10 SUPPOSITORY RECTAL at 08:09

## 2022-09-29 RX ADMIN — PANTOPRAZOLE SODIUM 40 MG: 40 TABLET, DELAYED RELEASE ORAL at 08:09

## 2022-09-29 RX ADMIN — INSULIN ASPART 12 UNITS: 100 INJECTION, SOLUTION INTRAVENOUS; SUBCUTANEOUS at 04:09

## 2022-09-29 RX ADMIN — FUROSEMIDE 20 MG: 20 TABLET ORAL at 08:09

## 2022-09-29 RX ADMIN — ENOXAPARIN SODIUM 90 MG: 100 INJECTION SUBCUTANEOUS at 09:09

## 2022-09-29 RX ADMIN — INSULIN ASPART 3 UNITS: 100 INJECTION, SOLUTION INTRAVENOUS; SUBCUTANEOUS at 09:09

## 2022-09-29 RX ADMIN — INSULIN ASPART 8 UNITS: 100 INJECTION, SOLUTION INTRAVENOUS; SUBCUTANEOUS at 12:09

## 2022-09-29 RX ADMIN — HYDROCODONE BITARTRATE AND ACETAMINOPHEN 1 TABLET: 10; 325 TABLET ORAL at 02:09

## 2022-09-29 RX ADMIN — POLYETHYLENE GLYCOL 3350 17 G: 17 POWDER, FOR SOLUTION ORAL at 08:09

## 2022-09-29 NOTE — PLAN OF CARE
Problem: Adult Inpatient Plan of Care  Goal: Plan of Care Review  Outcome: Ongoing, Progressing  Goal: Absence of Hospital-Acquired Illness or Injury  Outcome: Ongoing, Progressing     Problem: Fall Injury Risk  Goal: Absence of Fall and Fall-Related Injury  Outcome: Ongoing, Progressing     Problem: Diabetes Comorbidity  Goal: Blood Glucose Level Within Targeted Range  Outcome: Ongoing, Progressing     Problem: Skin Injury Risk Increased  Goal: Skin Health and Integrity  Outcome: Ongoing, Progressing     Problem: Pain Acute  Goal: Acceptable Pain Control and Functional Ability  Outcome: Ongoing, Progressing

## 2022-09-29 NOTE — ASSESSMENT & PLAN NOTE
Patient's FSGs are controlled on current medication regimen.  Last A1c reviewed-   Lab Results   Component Value Date    HGBA1C 6.7 (H) 09/20/2022     Most recent fingerstick glucose reviewed-   Recent Labs   Lab 09/28/22  1719 09/28/22  2055 09/29/22  0712 09/29/22  1155   POCTGLUCOSE 212* 316* 253* 304*     Current correctional scale  Medium  Maintain anti-hyperglycemic dose as follows-   Antihyperglycemics (From admission, onward)    Start     Stop Route Frequency Ordered    09/29/22 2100  insulin detemir U-100 pen 19 Units         -- SubQ Nightly 09/29/22 1529    09/29/22 1645  insulin aspart U-100 pen 12 Units         -- SubQ 3 times daily with meals 09/29/22 1529    09/20/22 2250  insulin aspart U-100 pen 1-10 Units         -- SubQ Before meals & nightly PRN 09/20/22 2152      Home medications: metformin, pioglitazone, Victoza    -- Hold oral hyperglycemics  -- SSI, POCT BG qACHS; titrate basal/bolus regimen PRN to 140-180 goal

## 2022-09-29 NOTE — PROGRESS NOTES
"[facility] - Neurosurgery ([facility])  [facility] Medicine  Progress Note    Patient Name: Kieran Styles  MRN: [id number]  Patient Class: IP- Inpatient   Admission Date: 9/20/2022  Length of Stay: 9 days  Attending Physician: Yesica Alcantara MD  Primary Care Provider: Sony Holt DO        Subjective:     Principal Problem:Brain mass        HPI:  76 yo PMHx DM2, CAD s/p CABG x 2 1997, history of adenomatous colon polyps (07/2017) revealing 10 mm sessile serrated adenoma, GERD, multiple pacemaker replacements most recent 2021 for AF (on warfarin), HTN, HLD, chronic back pain, MARQUIS on CPAP presenting to [facility] ED from [facility] due to a 2 week history of sudden memory deficits, disorientation, confsuion (date, situation, short-term memory), and multiple falls w/o head trauma or LOC or seizure. Wife noticed that he was forgetting where he was, what he had been doing. In addition, he has been getting weaker and more unstable on his feet. Yesterday evening, he fell while going to the bathroom and hit his elbow. Transported to EMS to [facility], where they did a CT head 09/20 this AM. Was discharged from [facility] because they initially told the wife that the CT scan was "clear", wife brought him to PCP and cardiologist OP office later that morning, but was called back by [facility] stating that they had mis-read the CT head and that the CT head was remarkable for apparent mass in brain.  Patient was subsequently transferred via EMS to [facility].  [facility] staff informed the wife that they were concerned for possible cancer, and wanted him to see neurosurgery evaluation.  Patient wife denies history cancer to her knowledge.  Patient is an active smoker smoking 1-2 packs per day, and has smoked for the last 30 years.  Social alcohol use, no other illicit substances.    On my initial evaluation, patient is conversational; however, he is only oriented to " self and time.  He exhibits short-term memory loss, and is not sure why he is here.  Overall, patient is a poor historian.  Family was not at bedside.  This history of present illness was obtained via telephone conversation with his wife ([phone] Donna Styles).  ROS includes denies chest pain, abdominal pain, nausea, vomiting, shortness of breath, cough, diarrhea, constipation, blood per rectum, fevers, chills, muscle aches, new weakness, new sensory changes.  Patient reports history of chronic lower back pain.    ED course:  Neurosurgery evaluated at [facility], concerns for metastasis.  No plans for surgical intervention at this time.  Subsequently pan CT scan pending.    PMHx/PSHx:  DM2, CAD s/p CABG x 2 , multiple pacemaker replacements most recent  for AF (on warfarin), HTN, HLD, chronic back pain, MARQUIS on CPAP    FMHx: Mother with breast cancer, . Sister NAFLD w/ cirrhosis, .      Overview/Hospital Course:  Patient presenting with sudden memory deficits, AMS, and multiple falls who was transferred from [facility] to [facility] for NSGY evaluation of the CT head () finding of a brain mass concerning for cancer. No plans for surgical intervention at this time. CT A/P with con demonstrated a pulmonary nodule, bony lesions in left ilium, L3 and L4 vertebral bodies concerning for metastasis. Patient's pacemaker is compatible with MRI; however, the leads are not therefore patient cannot undergo MRI. NSGY palnning for CT head on  to assess for interval change. ID consulted and believes brain mass with pulmonary nodule is likely 2/2 malignancy given h/o significant smoking and recommended IR bx of pulmonary nodule in addition to further infectious w/u. LP with IR under fluoro successfully completed on , CSF studies remarkable for lymphocytic pleocytosis, elevated protein, glucose, and negative flow cytometry. IR recommended pulmonary nodule bx outpatient. Pending surgical bx  per NSGY.       Interval History: Patient still without a BM for 10 days despite aggressive bowel regiment, will advance today to ensure healthy bowel movement.     Review of Systems   Constitutional:  Negative for chills and fever.   HENT:  Negative for rhinorrhea, sneezing, sore throat and trouble swallowing.    Respiratory:  Negative for cough and shortness of breath.    Cardiovascular:  Negative for chest pain and palpitations.   Gastrointestinal:  Positive for constipation. Negative for diarrhea, nausea and vomiting.   Genitourinary:  Negative for dysuria, frequency and urgency.   Musculoskeletal:  Positive for arthralgias and back pain. Negative for myalgias.   Skin:  Negative for rash and wound.   Neurological:  Negative for syncope, facial asymmetry, speech difficulty, weakness, light-headedness and headaches.   Psychiatric/Behavioral:  Positive for agitation, confusion and decreased concentration.    Objective:     Vital Signs (Most Recent):  Temp: 96 °F (35.6 °C) (09/29/22 1133)  Pulse: 75 (09/29/22 1133)  Resp: 18 (09/29/22 1441)  BP: 103/60 (09/29/22 1133)  SpO2: (!) 93 % (09/29/22 1133) Vital Signs (24h Range):  Temp:  [96 °F (35.6 °C)-98.4 °F (36.9 °C)] 96 °F (35.6 °C)  Pulse:  [69-75] 75  Resp:  [18-20] 18  SpO2:  [93 %-99 %] 93 %  BP: (103-136)/(57-71) 103/60     Weight: 86.2 kg (190 lb)  Body mass index is 25.77 kg/m².    Intake/Output Summary (Last 24 hours) at 9/29/2022 1524  Last data filed at 9/29/2022 0616  Gross per 24 hour   Intake 360 ml   Output --   Net 360 ml        Physical Exam  Vitals and nursing note reviewed.   Constitutional:       General: He is not in acute distress.     Appearance: He is obese. He is not ill-appearing or diaphoretic.   HENT:      Right Ear: External ear normal.      Left Ear: External ear normal.      Nose: Nose normal.   Eyes:      General:         Right eye: No discharge.         Left eye: No discharge.      Conjunctiva/sclera: Conjunctivae normal.    Cardiovascular:      Rate and Rhythm: Normal rate and regular rhythm.      Heart sounds: Normal heart sounds. No murmur heard.  Pulmonary:      Effort: Pulmonary effort is normal. No respiratory distress.      Breath sounds: Normal breath sounds. No wheezing or rales.   Abdominal:      General: Abdomen is flat. There is no distension.      Palpations: There is no mass.      Tenderness: There is no abdominal tenderness.      Comments: Abdominal firmness and mild soreness indicating stool burden   Musculoskeletal:         General: No swelling, tenderness or deformity. Normal range of motion.      Cervical back: Normal range of motion and neck supple.      Right lower leg: No edema.      Left lower leg: No edema.   Skin:     General: Skin is warm.      Coloration: Skin is not jaundiced.      Findings: No bruising.   Neurological:      Mental Status: He is alert.      Cranial Nerves: No cranial nerve deficit.      Motor: Weakness (left sided, LUE weak 2/2 h/o Polio) present.      Comments: Oriented to person and year but not to place  Poor memory recall       Significant Labs: All pertinent labs within the past 24 hours have been reviewed.    Significant Imaging: I have reviewed all pertinent imaging results/findings within the past 24 hours.      Assessment/Plan:      * Brain mass  77-year-old male with extensive past medical history presenting from [facility] for Neurosurgery evaluation of a brain mass found on CT head.  Per patient wife, patient has had sudden memory problems in the last 2 weeks with recurrent falls, but without loss of consciousness, seizure activity, head trauma.  Baseline is normal without other limitations.  Patient also has been describing 1 year of headache.  Has seen neurology outpatient, and has been taking gabapentin and undergoing nerve block procedures in neck with little effect. CT Head woc at OSH demonstrated ependymal/subependymal mass along the left lateral ventricle and  extending at least to midline at the 3rd ventricle.     Concerns for metastatic disease, possibly lung as primary given extensive smoking history.    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     Patient's pacemaker leads are NOT compatible with MRI    - negative toxoplasma, HIV, and RPR    LP (9/28):  - elevated CSF protein, glucose, lymphocytes  - negative flow cytometry  - pending cytology    Plan:   -- NSGY evaluated, pending recs in regards to surgical intervention  --Dex 4mg q6  --PUD PPx while steroid treatment ongoing, patient on home pantoprazole 40mg qd  --Continue to monitor clinically, notify NSGY immediately with any changes in neuro status  -- ID consulted to r/o infectious etiology of mass   - f/u toxoplasma IgG and IgM, immunodeficiency panel, serum cryptococcus antibody    - IR fluoro guided LP successfully completed on 9/28   - f/u CSF labs: cell count with diff, protein, glucose, gram stain, culture, cytology, and flow cytometry   - pulmonary nodule bx can be done outpatient with studies (pathology, cytology, aerobic/anaerobic/fungal/AFB cultures)  -- zyprexa 10mg nightly for agitation, 5mg BID PRN for breakthrough  -- Neuro check q4h  -- Fall, Aspiration, Delirium precautions    Constipation  - advanced bowel regiment to miralax and doc-senna to twice daily  - rectal bisacodyl suppository given without relief  - FLEET enema and oral lactulose administered, pending BM      Dermatitis associated with moisture  Wound care consulted, appreciate recs      COPD (chronic obstructive pulmonary disease)  On Anoro. Rx of singular but not taking.    -- Continue  formulary equivalent of inhaler  -- PRN duoneb  -- SpO2 goal 88-92       Restless leg  -- Continue home ropinirole       GERD (gastroesophageal reflux disease)  -- Continue home PPI      Chronic back pain  -- MM pain regimen PRN   Tylenol PRN   Home Narco PRN   Lidocaine patches      Hypertension  Home medications: atenolol 50 qd, losartan-HCTZ 100-25 mg qd    -- Continue home medications as tolerated      MARQUIS on CPAP  -- Maintain nightly CPAP  -- Patient wife to bring home CPAP      Pacemaker  Pacemaker is compatible with MRI but the leads are NOT compatible with MRI      Atrial fibrillation  Chronic anticoagulation use w/ warfarin  S/P pacemaker with multiple replacements    Home meds: Warfarin 7.5 qd, atenolol 50 qd  [id number]      -- Discontinued home warfarin in the case of NSGY intervention  -- Lovenox bridge while off warfarin   -- h/o stroke while off AC for a past colonoscopy  -- Continue home ASA, statin, and atenolol  -- Daily INR   -- Cardiac telemetry  -- Maintain K > 4, Mg > 2, Ca wnl; replete PRN  -- STAT cardiology consult if hemodynamic instability for DCCV evaluation        Hyperlipidemia  - See CAD involving coronary bypass graft      Coronary artery disease involving coronary bypass graft  CABG x 2 1997  Dyslipidemia    -- Continue ASA 81  -- Continue HI statin    Type 2 diabetes mellitus, without long-term current use of insulin  Patient's FSGs are controlled on current medication regimen.  Last A1c reviewed-   Lab Results   Component Value Date    HGBA1C 6.7 (H) 09/20/2022     Most recent fingerstick glucose reviewed-   Recent Labs   Lab 09/28/22  1719 09/28/22  2055 09/29/22  0712 09/29/22  1155   POCTGLUCOSE 212* 316* 253* 304*     Current correctional scale  Medium  Maintain anti-hyperglycemic dose as follows-   Antihyperglycemics (From admission, onward)    Start     Stop Route Frequency Ordered    09/29/22 2100  insulin detemir U-100 pen 19 Units         -- SubQ Nightly 09/29/22 2292     09/29/22 1645  insulin aspart U-100 pen 12 Units         -- SubQ 3 times daily with meals 09/29/22 1529    09/20/22 2250  insulin aspart U-100 pen 1-10 Units         -- SubQ Before meals & nightly PRN 09/20/22 2152      Home medications: metformin, pioglitazone, Victoza    -- Hold oral hyperglycemics  -- SSI, POCT BG qACHS; titrate basal/bolus regimen PRN to 140-180 goal        VTE Risk Mitigation (From admission, onward)         Ordered     enoxaparin injection 90 mg  2 times daily         09/28/22 1503     Reason for No Pharmacological VTE Prophylaxis  Once        Question:  Reasons:  Answer:  Already adequately anticoagulated on oral Anticoagulants    09/20/22 2152     IP VTE HIGH RISK PATIENT  Once         09/20/22 2152     Place sequential compression device  Until discontinued         09/20/22 2039                Discharge Planning   ANITA: 9/30/2022     Code Status: Full Code   Is the patient medically ready for discharge?: No    Reason for patient still in hospital (select all that apply): Consult recommendations  Discharge Plan A: Rehab   Discharge Delays: None known at this time              Sarah Llanos MD  Department of Hospital Medicine   [facility] ([facility])

## 2022-09-29 NOTE — PT/OT/SLP PROGRESS
Physical Therapy Treatment  Co-Treatment with OT     Patient Name:  Kieran Styles   MRN:  [id number]    Co-treatment performed due to patient's multiple deficits requiring two skilled therapists to appropriately and safely assess patient's strength and endurance while facilitating functional tasks in addition to accommodating for patient's activity tolerance.     Recommendations:   Discharge Recommendations:  rehabilitation facility   Discharge Equipment Recommendations:  (TBD depending progress)   Barriers to discharge: Increased skilled assistance needed; fall risk   Assessment:   Kieran Styles is a 77 y.o. male admitted with a medical diagnosis of Brain mass. He presents with the following impairments/functional limitations:  weakness, impaired endurance, impaired self care skills, impaired functional mobility, gait instability, impaired balance, impaired cognition, decreased coordination, decreased upper extremity function, decreased lower extremity function, decreased safety awareness. Patient tolerated session fairly well. Patient is progressing with mobility. Able to complete transfer to chair. RN educated on importance of patient mobility. Patient is an excellent candidate for IPR due to having a qualifying diagnosis, high level of motivation to improve, appropriate support following discharge, and able to tolerate 3 hours of intensive therapy in order to achieve a greater level of mobility.  Patient is currently functioning below PLOF Patient would require increased assistance should they discharge home . Patient would continue to benefit from skilled PT services while in the hospital. At this time, upon d/c recommendation of inpatient rehab  in order for patient to progress towards an improved level of functional mobility independence.     Rehab Prognosis: Good; patient would benefit from acute skilled PT services to address these deficits and reach maximum level of function.    Recent Surgery:  "Procedure(s) (LRB):  Lumbar Puncture (N/A) 2 Days Post-Op    Plan:     During this hospitalization, patient to be seen 4 x/week to address the identified rehab impairments via gait training, therapeutic activities, therapeutic exercises, neuromuscular re-education and progress toward the following goals:    Plan of Care Expires:  10/23/22    Subjective     Chief Complaint: none   Patient/Family Comments/goals: "I just have a little sinus headache."  Pain/Comfort:       Objective:   Communicated with RN prior to session.  Kieran Styles found HOB elevated with bed alarm, telemetry, peripheral IV ([employer] camera) upon PT entry to room.     General Precautions: Standard, fall   Orthopedic Precautions:N/A   Braces: N/A    Vitals:    09/30/22 1204   BP:    Pulse:    Resp: 20   Temp:        Functional Mobility:2  Bed Mobility:    Rolling Left:  maximal assistance of 2 persons  Rolling Right: maximal assistance  Scooting: maximal assistance of 2 persons  Supine to Sit: maximal assistance of 2 persons for LE management and trunk management  Sit to Supine: maximal assistance of 2 persons for LE management and trunk management  Transfers:     Sit to Stand: 2 trials from EOB   1st trial: moderate assistance and of 2 persons with hand-held assist  2nd trial: minimum assistance and of 2 persons with hand-held assistance   Gait: ~4 steps to bedside chair with ModAx2 and HHA   wide TEE, shuffling steps, FFP, decreased hip extension, mildly unsteady    decreased speed, swing-through gait pattern , and decreased remedios    Balance:    Level of assist   Static Sitting  ModA-close SBA    Dynamic Sitting  Dolly-CGA   Static Standing  ModAx2   Dynamic Standing  ModAx     AM-PAC 6 CLICK MOBILITY        Therapeutic Activities and Education:  -Patient educated on the continued role and goal of PT  -Questions and concerns answered within the the PT scope of practice.        - Patient encouraged to continue to complete mobility in safe " range to prevent adverse risks associated with prolonged bed rest   -White board updated in patient room to reflect level of assistance needed with nursing.   -Patient is clear to stand pivot transfer with RN/PCT, assist x2  for mobility with RN    Kieran Styles left up in chair with all lines intact, call button in reach, chair alarm on, RN notified, and wife, Cleo camera present..    GOALS:   Multidisciplinary Problems       Physical Therapy Goals          Problem: Physical Therapy    Goal Priority Disciplines Outcome Goal Variances Interventions   Physical Therapy Goal     PT, PT/OT Ongoing, Progressing     Description: Goals to be met by: 10/8/2022     Patient will increase functional independence with mobility by performin. Supine to sit with MInimal Assistance  2. Sit to stand transfer with Minimal Assistance  3. Bed to chair transfer with Minimal Assistance using LRAD as needed  4. Gait  x 25 feet with Minimal Assistance using LRAD as needed.   5. Sitting at edge of bed x8 minutes with Stand-by Assistance while completing dynamic functional task  6. Lower extremity exercise program x15 reps, with supervision, in order to increase LE strength and (I) with functional mobility.                        Time Tracking:     PT Received On: 22  PT Start Time: 1455     PT Stop Time: 1518  PT Total Time (min): 23 min     Billable Minutes: Gait Training 10 and Neuromuscular Re-education 13    Treatment Type: Treatment  PT/PTA: PT     PTA Visit Number: 1     Savanna Barboza, PT  2022

## 2022-09-29 NOTE — ASSESSMENT & PLAN NOTE
Chronic anticoagulation use w/ warfarin  S/P pacemaker with multiple replacements    Home meds: Warfarin 7.5 qd, atenolol 50 qd  [id number] 8      -- Discontinued home warfarin in the case of NSGY intervention  -- Lovenox bridge while off warfarin   -- h/o stroke while off AC for a past colonoscopy  -- Continue home ASA, statin, and atenolol  -- Daily INR   -- Cardiac telemetry  -- Maintain K > 4, Mg > 2, Ca wnl; replete PRN  -- STAT cardiology consult if hemodynamic instability for DCCV evaluation

## 2022-09-29 NOTE — PROGRESS NOTES
[facility] - Neurosurgery ([facility])  Neurosurgery  Progress Note    Subjective:     History of Present Illness: Patient is a 77M with PMH of HTN, DM, polio, CAD (on ASA/Coumadin) who presents with multiple falls (including this morning). Patient was transferred from OSH for NSGY for possible brain mass.    Patient has a pacemaker so it is uncertain whether or not he can get an MRI. He is originally from [facility] and was transferred here for care. Patient denies head trauma with his fall this morning. He notes that he has started using a walker in the past few weeks. Patient complains of L leg pain, likely related to his fall. 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      Post-Op Info:  Procedure(s) (LRB):  Lumbar Puncture (N/A)   1 Day Post-Op     Interval History: 9/29: ESDRASEON. Neuro stable. LP performed yesterday. Pending flow cytometry and cytology.    Medications:  Continuous Infusions:  Scheduled Meds:   aspirin  81 mg Oral Daily    atenoloL  50 mg Oral Daily    atorvastatin  40 mg Oral QHS    bisacodyL  10 mg Rectal Daily    dexamethasone  4 mg Intravenous Q6H    enoxaparin  1 mg/kg Subcutaneous BID    fluticasone furoate-vilanteroL  1 puff Inhalation Daily    furosemide  20 mg Oral Daily    insulin aspart U-100  9 Units Subcutaneous TIDWM    insulin detemir U-100  15 Units Subcutaneous QHS    LIDOcaine  1 patch Transdermal Q24H    losartan-hydrochlorothiazide 100-25 mg  1 tablet Oral Daily    nicotine  1 patch Transdermal Daily    OLANZapine  10 mg Oral QHS    pantoprazole  40 mg Oral Daily    polyethylene glycol  17 g Oral BID    rOPINIRole  1 mg Oral QHS    senna-docusate 8.6-50 mg  2 tablet Oral Daily     PRN Meds:acetaminophen, albuterol-ipratropium, aluminum-magnesium hydroxide-simethicone, dextrose 10%, dextrose 10%, glucagon (human recombinant), glucose, glucose, HYDROcodone-acetaminophen, hydrOXYzine HCL, insulin aspart U-100, melatonin, naloxone,  OLANZapine, sodium chloride 0.9%, sodium chloride 0.9%     Review of Systems  Objective:     Weight: 86.2 kg (190 lb)  Body mass index is 25.77 kg/m².  Vital Signs (Most Recent):  Temp: 96 °F (35.6 °C) (09/29/22 1133)  Pulse: 75 (09/29/22 1133)  Resp: 18 (09/29/22 1133)  BP: 103/60 (09/29/22 1133)  SpO2: (!) 93 % (09/29/22 1133) Vital Signs (24h Range):  Temp:  [96 °F (35.6 °C)-98.4 °F (36.9 °C)] 96 °F (35.6 °C)  Pulse:  [69-75] 75  Resp:  [18-20] 18  SpO2:  [93 %-99 %] 93 %  BP: (103-136)/(57-71) 103/60                            Physical Exam    Neurosurgery Physical Exam  General: AOx2, GCS E4V4M6, fluctuating confusion  CNII-XII: Intact on fine exam, PERRL, visual fields grossly intact, EOMI, facial sensation preserved, no facial asymmetry, tongue midline, shoulder shrug equal, no pronator drift  Extremities: LUE contracted at baseline (4/5 motor), 5/5 motor otherwise, sensorium intact throughout, coordination intact throughout, DTRs 2+, no pathological reflexes, no sensory level present    Significant Labs:  Recent Labs   Lab 09/28/22 0458 09/29/22 0323   * 235*    137   K 4.5 4.3    103   CO2 26 22*   BUN 31* 28*   CREATININE 1.0 0.9   CALCIUM 9.4 9.6   MG 2.0 1.9       Recent Labs   Lab 09/28/22 0458 09/29/22 0323   WBC 12.85* 14.88*   HGB 15.0 16.0   HCT 46.2 48.4    198       Recent Labs   Lab 09/28/22 0458 09/29/22 0323   INR 1.2 1.2       Microbiology Results (last 7 days)       Procedure Component Value Units Date/Time    CSF culture [[id number]] Collected: 09/28/22 1103    Order Status: Completed Specimen: CSF (Spinal Fluid) from CSF Tap, Tube 3 Updated: 09/29/22 0707     CSF CULTURE No Growth to date     Gram Stain Result No WBC's      No organisms seen    Blood culture [[id number]] Collected: 09/23/22 1546    Order Status: Completed Specimen: Blood from Peripheral, Left Arm Updated: 09/28/22 1812     Blood Culture, Routine No growth after 5 days.    Blood culture  [[id number]] Collected: 09/23/22 1546    Order Status: Completed Specimen: Blood from Peripheral, Right Arm Updated: 09/28/22 1812     Blood Culture, Routine No growth after 5 days.    Gram stain [[id number]] Collected: 09/28/22 1103    Order Status: Sent Specimen: CSF (Spinal Fluid) from CSF Tap, Tube 3 Updated: 09/28/22 1116          All pertinent labs from the last 24 hours have been reviewed.    Significant Diagnostics:  I have reviewed all pertinent imaging results/findings within the past 24 hours.    Assessment/Plan:     * Brain mass  Patient is a 77M with PMH of HTN, DM, polio, CAD (on ASA/Coumadin with pacemaker) who presents with multiple falls (including this morning). Patient was transferred from OSH for NSGY for possible brain mass.    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   --CTH with contrast 9/20: interventricular diffuse enhancing mass in lateral vents and 3rd vent   --CT chest/abdo/pelvis 9/21: 1.1 cm solid solitary RUL nodule, additional micronodule, hepatosplenomegaly, nonsepcific lucent lesions in L ilum, L3 and L4   --Cannot do MRI - pacemaker MRI compatible, but leads not   --Repeat CTH w wo con 9/26 shows smaller lesion with less contrast enhancement  --Follow up LP results, pending flow cytology   -CSF 9/28: r16, 2; w35 64 (lymphocytes 96, 96); g178; p291; Cx NGTD   -flow cytometry negative, cytology pending   -no acute surgical intervention warranted currently  --f/u heme onc recs   -consider biopsy if necessary based on cytology results  --SBP <160  --Na >135  --Dex 4q6  --PUD PPx while steroid treatment ongoing  --Recommend PT/OT daily  --Continue to monitor clinically, notify NSGY immediately with any changes in neuro status        Bonita Fontaine MD  Neurosurgery  [facility] - Neurosurgery ([facility])

## 2022-09-29 NOTE — SUBJECTIVE & OBJECTIVE
Interval History: 9/29: NAEON. Neuro stable. LP performed yesterday. Pending flow cytometry and cytology.    Medications:  Continuous Infusions:  Scheduled Meds:   aspirin  81 mg Oral Daily    atenoloL  50 mg Oral Daily    atorvastatin  40 mg Oral QHS    bisacodyL  10 mg Rectal Daily    dexamethasone  4 mg Intravenous Q6H    enoxaparin  1 mg/kg Subcutaneous BID    fluticasone furoate-vilanteroL  1 puff Inhalation Daily    furosemide  20 mg Oral Daily    insulin aspart U-100  9 Units Subcutaneous TIDWM    insulin detemir U-100  15 Units Subcutaneous QHS    LIDOcaine  1 patch Transdermal Q24H    losartan-hydrochlorothiazide 100-25 mg  1 tablet Oral Daily    nicotine  1 patch Transdermal Daily    OLANZapine  10 mg Oral QHS    pantoprazole  40 mg Oral Daily    polyethylene glycol  17 g Oral BID    rOPINIRole  1 mg Oral QHS    senna-docusate 8.6-50 mg  2 tablet Oral Daily     PRN Meds:acetaminophen, albuterol-ipratropium, aluminum-magnesium hydroxide-simethicone, dextrose 10%, dextrose 10%, glucagon (human recombinant), glucose, glucose, HYDROcodone-acetaminophen, hydrOXYzine HCL, insulin aspart U-100, melatonin, naloxone, OLANZapine, sodium chloride 0.9%, sodium chloride 0.9%     Review of Systems  Objective:     Weight: 86.2 kg (190 lb)  Body mass index is 25.77 kg/m².  Vital Signs (Most Recent):  Temp: 96 °F (35.6 °C) (09/29/22 1133)  Pulse: 75 (09/29/22 1133)  Resp: 18 (09/29/22 1133)  BP: 103/60 (09/29/22 1133)  SpO2: (!) 93 % (09/29/22 1133) Vital Signs (24h Range):  Temp:  [96 °F (35.6 °C)-98.4 °F (36.9 °C)] 96 °F (35.6 °C)  Pulse:  [69-75] 75  Resp:  [18-20] 18  SpO2:  [93 %-99 %] 93 %  BP: (103-136)/(57-71) 103/60                            Physical Exam    Neurosurgery Physical Exam  General: AOx2, GCS E4V4M6, fluctuating confusion  CNII-XII: Intact on fine exam, PERRL, visual fields grossly intact, EOMI, facial sensation preserved, no facial asymmetry, tongue midline, shoulder shrug equal, no pronator  drift  Extremities: LUE contracted at baseline (4/5 motor), 5/5 motor otherwise, sensorium intact throughout, coordination intact throughout, DTRs 2+, no pathological reflexes, no sensory level present    Significant Labs:  Recent Labs   Lab 09/28/22 0458 09/29/22 0323   * 235*    137   K 4.5 4.3    103   CO2 26 22*   BUN 31* 28*   CREATININE 1.0 0.9   CALCIUM 9.4 9.6   MG 2.0 1.9       Recent Labs   Lab 09/28/22 0458 09/29/22 0323   WBC 12.85* 14.88*   HGB 15.0 16.0   HCT 46.2 48.4    198       Recent Labs   Lab 09/28/22 0458 09/29/22 0323   INR 1.2 1.2       Microbiology Results (last 7 days)       Procedure Component Value Units Date/Time    CSF culture [[id number]] Collected: 09/28/22 1103    Order Status: Completed Specimen: CSF (Spinal Fluid) from CSF Tap, Tube 3 Updated: 09/29/22 0707     CSF CULTURE No Growth to date     Gram Stain Result No WBC's      No organisms seen    Blood culture [[id number]] Collected: 09/23/22 1546    Order Status: Completed Specimen: Blood from Peripheral, Left Arm Updated: 09/28/22 1812     Blood Culture, Routine No growth after 5 days.    Blood culture [[id number]] Collected: 09/23/22 1546    Order Status: Completed Specimen: Blood from Peripheral, Right Arm Updated: 09/28/22 1812     Blood Culture, Routine No growth after 5 days.    Gram stain [[id number]] Collected: 09/28/22 1103    Order Status: Sent Specimen: CSF (Spinal Fluid) from CSF Tap, Tube 3 Updated: 09/28/22 1116          All pertinent labs from the last 24 hours have been reviewed.    Significant Diagnostics:  I have reviewed all pertinent imaging results/findings within the past 24 hours.

## 2022-09-29 NOTE — PT/OT/SLP PROGRESS
Physical Therapy Treatment    Patient Name:  Kieran Styles   MRN:  [id number]    Recommendations:     Discharge Recommendations:  rehabilitation facility   Discharge Equipment Recommendations:  (TBD depending progress)   Barriers to discharge: Decreased caregiver support    Assessment:     Kieran Styles is a 77 y.o. male admitted with a medical diagnosis of Brain mass.  He presents with the following impairments/functional limitations:  weakness, impaired endurance, impaired self care skills, impaired functional mobility, gait instability, impaired cognition, decreased lower extremity function, decreased safety awareness Pt tolerated treatment well as he was able to gait train for 8 steps with mod A x 2 persons. Pt needed verbal cueing to not get up on his own and that he needs to call a nurse before getting up. Pt will continue to benefit from skilled Pt 4x/week in order to increase functional mobility. Pt when medically stable should discharge to a rehab facility.    Rehab Prognosis: Fair; patient would benefit from acute skilled PT services to address these deficits and reach maximum level of function.    Recent Surgery: Procedure(s) (LRB):  Lumbar Puncture (N/A) 1 Day Post-Op    Plan:     During this hospitalization, patient to be seen 4 x/week to address the identified rehab impairments via gait training, therapeutic activities, therapeutic exercises, neuromuscular re-education and progress toward the following goals:    Plan of Care Expires:  10/23/22    Subjective     Chief Complaint: legs began to cramp  Patient/Family Comments/goals: to get back to prior level of function  Pain/Comfort:  Pain Rating 1: 3/10 (pain in neck)  Pain Rating Post-Intervention 1: 3/10 (pain in neck cramping in legs)      Objective:     Communicated with nurse prior to session.  Patient found supine with bed alarm, telemetry (hep lock iv) upon PT entry to room.     General Precautions: Standard, fall   Orthopedic  Precautions:N/A   Braces: N/A  Respiratory Status: Room air     Functional Mobility:  Bed Mobility:     Supine to Sit: minimum assistance. Pt need assistance at legs.    Transfers:     Sit to Stand x 2 reps:  minimum assistance and of 2 persons with no AD from EOB. Mod A x 2 persons when edge of chair.    Gait: Pt gait trained 8 steps with mod A x 2 person with hand held A. Pt stated that his legs were starting to cramp and that he needed to sit down.    Balance: Pt sat EOB for 5 min with min A to SBA while having socks put on.      AM-PAC 6 CLICK MOBILITY  Turning over in bed (including adjusting bedclothes, sheets and blankets)?: 3  Sitting down on and standing up from a chair with arms (e.g., wheelchair, bedside commode, etc.): 3  Moving from lying on back to sitting on the side of the bed?: 3  Moving to and from a bed to a chair (including a wheelchair)?: 3  Need to walk in hospital room?: 2  Climbing 3-5 steps with a railing?: 1  Basic Mobility Total Score: 15       Therapeutic Activities and Exercises:   Pt was verbally quizzed and educated on calling the nurse if he wants to get up from the chair.  Pt and wife were verbally educated on POC. Pt and wife both expressed verbal understanding.    Patient left up in chair with all lines intact, call button in reach, chair alarm on, nurse notified, and wife present..    GOALS:   Multidisciplinary Problems       Physical Therapy Goals          Problem: Physical Therapy    Goal Priority Disciplines Outcome Goal Variances Interventions   Physical Therapy Goal     PT, PT/OT Ongoing, Progressing     Description: Goals to be met by: 10/8/2022     Patient will increase functional independence with mobility by performin. Supine to sit with MInimal Assistance  2. Sit to stand transfer with Minimal Assistance  3. Bed to chair transfer with Minimal Assistance using LRAD as needed  4. Gait  x 25 feet with Minimal Assistance using LRAD as needed.   5. Sitting at edge of  bed x8 minutes with Stand-by Assistance while completing dynamic functional task  6. Lower extremity exercise program x15 reps, with supervision, in order to increase LE strength and (I) with functional mobility.                        Time Tracking:     PT Received On: 09/29/22  PT Start Time: 1002     PT Stop Time: 1027  PT Total Time (min): 25 min     Billable Minutes: Gait Training 10 mins and Therapeutic Activity 15 minutes    Treatment Type: Treatment  PT/PTA: PT     PTA Visit Number: 0     09/29/2022

## 2022-09-29 NOTE — PLAN OF CARE
Problem: Adult Inpatient Plan of Care  Goal: Plan of Care Review  Outcome: Ongoing, Progressing  Goal: Patient-Specific Goal (Individualized)  Outcome: Ongoing, Progressing  Goal: Absence of Hospital-Acquired Illness or Injury  Outcome: Ongoing, Progressing  Goal: Optimal Comfort and Wellbeing  Outcome: Ongoing, Progressing  Goal: Readiness for Transition of Care  Outcome: Ongoing, Progressing     Problem: Fall Injury Risk  Goal: Absence of Fall and Fall-Related Injury  Outcome: Ongoing, Progressing     Problem: Adult Inpatient Plan of Care  Goal: Plan of Care Review  Outcome: Ongoing, Progressing  Goal: Patient-Specific Goal (Individualized)  Outcome: Ongoing, Progressing  Goal: Absence of Hospital-Acquired Illness or Injury  Outcome: Ongoing, Progressing  Goal: Optimal Comfort and Wellbeing  Outcome: Ongoing, Progressing  Goal: Readiness for Transition of Care  Outcome: Ongoing, Progressing     Problem: Fall Injury Risk  Goal: Absence of Fall and Fall-Related Injury  Outcome: Ongoing, Progressing

## 2022-09-29 NOTE — ASSESSMENT & PLAN NOTE
- advanced bowel regiment to miralax and doc-senna to twice daily  - rectal bisacodyl suppository given without relief  - FLEET enema and oral lactulose administered, pending BM

## 2022-09-29 NOTE — ASSESSMENT & PLAN NOTE
Patient is a 77M with PMH of HTN, DM, polio, CAD (on ASA/Coumadin with pacemaker) who presents with multiple falls (including this morning). Patient was transferred from OSH for NSGY for possible brain mass.    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   --CTH with contrast 9/20: interventricular diffuse enhancing mass in lateral vents and 3rd vent   --CT chest/abdo/pelvis 9/21: 1.1 cm solid solitary RUL nodule, additional micronodule, hepatosplenomegaly, nonsepcific lucent lesions in L ilum, L3 and L4   --Cannot do MRI - pacemaker MRI compatible, but leads not   --Repeat CTH w wo con 9/26 shows smaller lesion with less contrast enhancement  --Follow up LP results, pending flow cytology   -CSF 9/28: r16, 2; w35 64 (lymphocytes 96, 96); g178; p291; Cx NGTD   -flow cytometry negative, cytology pending   -no acute surgical intervention warranted currently  --f/u heme onc recs   -consider biopsy if necessary based on cytology results  --SBP <160  --Na >135  --Dex 4q6  --PUD PPx while steroid treatment ongoing  --Recommend PT/OT daily  --Continue to monitor clinically, notify NSGY immediately with any changes in neuro status

## 2022-09-29 NOTE — ASSESSMENT & PLAN NOTE
77-year-old male with extensive past medical history presenting from [facility] for Neurosurgery evaluation of a brain mass found on CT head.  Per patient wife, patient has had sudden memory problems in the last 2 weeks with recurrent falls, but without loss of consciousness, seizure activity, head trauma.  Baseline is normal without other limitations.  Patient also has been describing 1 year of headache.  Has seen neurology outpatient, and has been taking gabapentin and undergoing nerve block procedures in neck with little effect. 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     Concerns for metastatic disease, possibly lung as primary given extensive smoking history.    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     Patient's pacemaker leads are NOT compatible with MRI    - negative toxoplasma, HIV, and RPR    LP (9/28):  - elevated CSF protein, glucose, lymphocytes  - negative flow cytometry  - pending cytology    Plan:   -- NSGY evaluated, pending recs in regards to surgical intervention  --Dex 4mg q6  --PUD PPx while steroid treatment ongoing, patient on home pantoprazole 40mg qd  --Continue to monitor clinically, notify NSGY immediately with any changes in neuro status  -- ID consulted to r/o infectious etiology of mass   - f/u toxoplasma IgG and IgM, immunodeficiency panel, serum cryptococcus antibody    - IR fluoro guided LP successfully completed on 9/28   - f/u CSF labs: cell count with diff, protein, glucose, gram stain, culture, cytology, and flow  cytometry   - pulmonary nodule bx can be done outpatient with studies (pathology, cytology, aerobic/anaerobic/fungal/AFB cultures)  -- zyprexa 10mg nightly for agitation, 5mg BID PRN for breakthrough  -- Neuro check q4h  -- Fall, Aspiration, Delirium precautions

## 2022-09-29 NOTE — SUBJECTIVE & OBJECTIVE
Interval History: Patient still without a BM for 10 days despite aggressive bowel regiment, will advance today to ensure healthy bowel movement.     Review of Systems   Constitutional:  Negative for chills and fever.   HENT:  Negative for rhinorrhea, sneezing, sore throat and trouble swallowing.    Respiratory:  Negative for cough and shortness of breath.    Cardiovascular:  Negative for chest pain and palpitations.   Gastrointestinal:  Positive for constipation. Negative for diarrhea, nausea and vomiting.   Genitourinary:  Negative for dysuria, frequency and urgency.   Musculoskeletal:  Positive for arthralgias and back pain. Negative for myalgias.   Skin:  Negative for rash and wound.   Neurological:  Negative for syncope, facial asymmetry, speech difficulty, weakness, light-headedness and headaches.   Psychiatric/Behavioral:  Positive for agitation, confusion and decreased concentration.    Objective:     Vital Signs (Most Recent):  Temp: 96 °F (35.6 °C) (09/29/22 1133)  Pulse: 75 (09/29/22 1133)  Resp: 18 (09/29/22 1441)  BP: 103/60 (09/29/22 1133)  SpO2: (!) 93 % (09/29/22 1133) Vital Signs (24h Range):  Temp:  [96 °F (35.6 °C)-98.4 °F (36.9 °C)] 96 °F (35.6 °C)  Pulse:  [69-75] 75  Resp:  [18-20] 18  SpO2:  [93 %-99 %] 93 %  BP: (103-136)/(57-71) 103/60     Weight: 86.2 kg (190 lb)  Body mass index is 25.77 kg/m².    Intake/Output Summary (Last 24 hours) at 9/29/2022 1524  Last data filed at 9/29/2022 0616  Gross per 24 hour   Intake 360 ml   Output --   Net 360 ml        Physical Exam  Vitals and nursing note reviewed.   Constitutional:       General: He is not in acute distress.     Appearance: He is obese. He is not ill-appearing or diaphoretic.   HENT:      Right Ear: External ear normal.      Left Ear: External ear normal.      Nose: Nose normal.   Eyes:      General:         Right eye: No discharge.         Left eye: No discharge.      Conjunctiva/sclera: Conjunctivae normal.   Cardiovascular:      Rate  and Rhythm: Normal rate and regular rhythm.      Heart sounds: Normal heart sounds. No murmur heard.  Pulmonary:      Effort: Pulmonary effort is normal. No respiratory distress.      Breath sounds: Normal breath sounds. No wheezing or rales.   Abdominal:      General: Abdomen is flat. There is no distension.      Palpations: There is no mass.      Tenderness: There is no abdominal tenderness.      Comments: Abdominal firmness and mild soreness indicating stool burden   Musculoskeletal:         General: No swelling, tenderness or deformity. Normal range of motion.      Cervical back: Normal range of motion and neck supple.      Right lower leg: No edema.      Left lower leg: No edema.   Skin:     General: Skin is warm.      Coloration: Skin is not jaundiced.      Findings: No bruising.   Neurological:      Mental Status: He is alert.      Cranial Nerves: No cranial nerve deficit.      Motor: Weakness (left sided, LUE weak 2/2 h/o Polio) present.      Comments: Oriented to person and year but not to place  Poor memory recall       Significant Labs: All pertinent labs within the past 24 hours have been reviewed.    Significant Imaging: I have reviewed all pertinent imaging results/findings within the past 24 hours.

## 2022-09-30 PROBLEM — K59.00 CONSTIPATION: Status: RESOLVED | Noted: 2022-09-27 | Resolved: 2022-09-30

## 2022-09-30 LAB
ALBUMIN SERPL BCP-MCNC: 3.3 G/DL (ref 3.5–5.2)
ALP SERPL-CCNC: 50 U/L (ref 55–135)
ALT SERPL W/O P-5'-P-CCNC: 16 U/L (ref 10–44)
ANION GAP SERPL CALC-SCNC: 11 MMOL/L (ref 8–16)
AST SERPL-CCNC: 13 U/L (ref 10–40)
BASOPHILS # BLD AUTO: 0.05 K/UL (ref 0–0.2)
BASOPHILS NFR BLD: 0.3 % (ref 0–1.9)
BILIRUB SERPL-MCNC: 0.8 MG/DL (ref 0.1–1)
BUN SERPL-MCNC: 36 MG/DL (ref 8–23)
CALCIUM SERPL-MCNC: 9.1 MG/DL (ref 8.7–10.5)
CHLORIDE SERPL-SCNC: 103 MMOL/L (ref 95–110)
CO2 SERPL-SCNC: 23 MMOL/L (ref 23–29)
CREAT SERPL-MCNC: 1 MG/DL (ref 0.5–1.4)
CRYPTOC AB TITR SER: NORMAL {TITER}
DIFFERENTIAL METHOD: ABNORMAL
EOSINOPHIL # BLD AUTO: 0 K/UL (ref 0–0.5)
EOSINOPHIL NFR BLD: 0.2 % (ref 0–8)
ERYTHROCYTE [DISTWIDTH] IN BLOOD BY AUTOMATED COUNT: 15.9 % (ref 11.5–14.5)
EST. GFR  (NO RACE VARIABLE): >60 ML/MIN/1.73 M^2
GLUCOSE SERPL-MCNC: 187 MG/DL (ref 70–110)
HCT VFR BLD AUTO: 46.1 % (ref 40–54)
HGB BLD-MCNC: 14.9 G/DL (ref 14–18)
IMM GRANULOCYTES # BLD AUTO: 0.4 K/UL (ref 0–0.04)
IMM GRANULOCYTES NFR BLD AUTO: 2.5 % (ref 0–0.5)
INR PPP: 1.3 (ref 0.8–1.2)
LYMPHOCYTES # BLD AUTO: 2.2 K/UL (ref 1–4.8)
LYMPHOCYTES NFR BLD: 13.5 % (ref 18–48)
MAGNESIUM SERPL-MCNC: 1.9 MG/DL (ref 1.6–2.6)
MCH RBC QN AUTO: 29.6 PG (ref 27–31)
MCHC RBC AUTO-ENTMCNC: 32.3 G/DL (ref 32–36)
MCV RBC AUTO: 92 FL (ref 82–98)
MONOCYTES # BLD AUTO: 1.2 K/UL (ref 0.3–1)
MONOCYTES NFR BLD: 7.1 % (ref 4–15)
NEUTROPHILS # BLD AUTO: 12.4 K/UL (ref 1.8–7.7)
NEUTROPHILS NFR BLD: 76.4 % (ref 38–73)
NRBC BLD-RTO: 0 /100 WBC
PHOSPHATE SERPL-MCNC: 2.8 MG/DL (ref 2.7–4.5)
PLATELET # BLD AUTO: 192 K/UL (ref 150–450)
PMV BLD AUTO: 12.3 FL (ref 9.2–12.9)
POCT GLUCOSE: 176 MG/DL (ref 70–110)
POCT GLUCOSE: 176 MG/DL (ref 70–110)
POCT GLUCOSE: 214 MG/DL (ref 70–110)
POCT GLUCOSE: 241 MG/DL (ref 70–110)
POCT GLUCOSE: 332 MG/DL (ref 70–110)
POTASSIUM SERPL-SCNC: 3.9 MMOL/L (ref 3.5–5.1)
PROT SERPL-MCNC: 6.1 G/DL (ref 6–8.4)
PROTHROMBIN TIME: 13.3 SEC (ref 9–12.5)
RBC # BLD AUTO: 5.03 M/UL (ref 4.6–6.2)
SODIUM SERPL-SCNC: 137 MMOL/L (ref 136–145)
WBC # BLD AUTO: 16.24 K/UL (ref 3.9–12.7)

## 2022-09-30 PROCEDURE — S4991 NICOTINE PATCH NONLEGEND: HCPCS

## 2022-09-30 PROCEDURE — 84100 ASSAY OF PHOSPHORUS: CPT

## 2022-09-30 PROCEDURE — 25000003 PHARM REV CODE 250

## 2022-09-30 PROCEDURE — 80053 COMPREHEN METABOLIC PANEL: CPT

## 2022-09-30 PROCEDURE — 97535 SELF CARE MNGMENT TRAINING: CPT

## 2022-09-30 PROCEDURE — 99232 SBSQ HOSP IP/OBS MODERATE 35: CPT | Mod: GC,,, | Performed by: STUDENT IN AN ORGANIZED HEALTH CARE EDUCATION/TRAINING PROGRAM

## 2022-09-30 PROCEDURE — 99232 PR SUBSEQUENT HOSPITAL CARE,LEVL II: ICD-10-PCS | Mod: GC,,, | Performed by: STUDENT IN AN ORGANIZED HEALTH CARE EDUCATION/TRAINING PROGRAM

## 2022-09-30 PROCEDURE — 63600175 PHARM REV CODE 636 W HCPCS

## 2022-09-30 PROCEDURE — 97116 GAIT TRAINING THERAPY: CPT | Mod: CQ

## 2022-09-30 PROCEDURE — 85610 PROTHROMBIN TIME: CPT

## 2022-09-30 PROCEDURE — 85025 COMPLETE CBC W/AUTO DIFF WBC: CPT

## 2022-09-30 PROCEDURE — 36415 COLL VENOUS BLD VENIPUNCTURE: CPT

## 2022-09-30 PROCEDURE — 94660 CPAP INITIATION&MGMT: CPT

## 2022-09-30 PROCEDURE — 83735 ASSAY OF MAGNESIUM: CPT

## 2022-09-30 PROCEDURE — 11000001 HC ACUTE MED/SURG PRIVATE ROOM

## 2022-09-30 PROCEDURE — 94640 AIRWAY INHALATION TREATMENT: CPT

## 2022-09-30 PROCEDURE — 97530 THERAPEUTIC ACTIVITIES: CPT

## 2022-09-30 PROCEDURE — 99900035 HC TECH TIME PER 15 MIN (STAT)

## 2022-09-30 PROCEDURE — 97530 THERAPEUTIC ACTIVITIES: CPT | Mod: CQ

## 2022-09-30 PROCEDURE — 94761 N-INVAS EAR/PLS OXIMETRY MLT: CPT

## 2022-09-30 RX ADMIN — INSULIN ASPART 4 UNITS: 100 INJECTION, SOLUTION INTRAVENOUS; SUBCUTANEOUS at 12:09

## 2022-09-30 RX ADMIN — ATENOLOL 50 MG: 25 TABLET ORAL at 08:09

## 2022-09-30 RX ADMIN — HYDROCODONE BITARTRATE AND ACETAMINOPHEN 1 TABLET: 10; 325 TABLET ORAL at 08:09

## 2022-09-30 RX ADMIN — INSULIN ASPART 2 UNITS: 100 INJECTION, SOLUTION INTRAVENOUS; SUBCUTANEOUS at 08:09

## 2022-09-30 RX ADMIN — ASPIRIN 81 MG: 81 TABLET, COATED ORAL at 08:09

## 2022-09-30 RX ADMIN — ACETAMINOPHEN 650 MG: 325 TABLET ORAL at 08:09

## 2022-09-30 RX ADMIN — INSULIN ASPART 12 UNITS: 100 INJECTION, SOLUTION INTRAVENOUS; SUBCUTANEOUS at 08:09

## 2022-09-30 RX ADMIN — FLUTICASONE FUROATE AND VILANTEROL TRIFENATATE 1 PUFF: 100; 25 POWDER RESPIRATORY (INHALATION) at 08:09

## 2022-09-30 RX ADMIN — HYDROCODONE BITARTRATE AND ACETAMINOPHEN 1 TABLET: 10; 325 TABLET ORAL at 12:09

## 2022-09-30 RX ADMIN — HYDROXYZINE HYDROCHLORIDE 25 MG: 25 TABLET, FILM COATED ORAL at 08:09

## 2022-09-30 RX ADMIN — INSULIN ASPART 4 UNITS: 100 INJECTION, SOLUTION INTRAVENOUS; SUBCUTANEOUS at 10:09

## 2022-09-30 RX ADMIN — DEXAMETHASONE SODIUM PHOSPHATE 4 MG: 4 INJECTION INTRA-ARTICULAR; INTRALESIONAL; INTRAMUSCULAR; INTRAVENOUS; SOFT TISSUE at 08:09

## 2022-09-30 RX ADMIN — OLANZAPINE 10 MG: 2.5 TABLET, FILM COATED ORAL at 08:09

## 2022-09-30 RX ADMIN — ATORVASTATIN CALCIUM 40 MG: 40 TABLET, FILM COATED ORAL at 08:09

## 2022-09-30 RX ADMIN — LIDOCAINE 1 PATCH: 50 PATCH CUTANEOUS at 12:09

## 2022-09-30 RX ADMIN — PANTOPRAZOLE SODIUM 40 MG: 40 TABLET, DELAYED RELEASE ORAL at 08:09

## 2022-09-30 RX ADMIN — SENNOSIDES AND DOCUSATE SODIUM 2 TABLET: 50; 8.6 TABLET ORAL at 08:09

## 2022-09-30 RX ADMIN — Medication 1 PATCH: at 08:09

## 2022-09-30 RX ADMIN — ENOXAPARIN SODIUM 90 MG: 100 INJECTION SUBCUTANEOUS at 08:09

## 2022-09-30 RX ADMIN — ROPINIROLE HYDROCHLORIDE 1 MG: 1 TABLET, FILM COATED ORAL at 08:09

## 2022-09-30 RX ADMIN — INSULIN ASPART 12 UNITS: 100 INJECTION, SOLUTION INTRAVENOUS; SUBCUTANEOUS at 12:09

## 2022-09-30 RX ADMIN — LACTULOSE 20 G: 20 SOLUTION ORAL at 08:09

## 2022-09-30 RX ADMIN — INSULIN ASPART 12 UNITS: 100 INJECTION, SOLUTION INTRAVENOUS; SUBCUTANEOUS at 05:09

## 2022-09-30 RX ADMIN — INSULIN ASPART 4 UNITS: 100 INJECTION, SOLUTION INTRAVENOUS; SUBCUTANEOUS at 05:09

## 2022-09-30 RX ADMIN — FUROSEMIDE 20 MG: 20 TABLET ORAL at 08:09

## 2022-09-30 RX ADMIN — POLYETHYLENE GLYCOL 3350 17 G: 17 POWDER, FOR SOLUTION ORAL at 08:09

## 2022-09-30 NOTE — SUBJECTIVE & OBJECTIVE
Interval History: Patient successful in having a bowel movement     Review of Systems   Constitutional:  Negative for chills and fever.   HENT:  Negative for rhinorrhea, sneezing, sore throat and trouble swallowing.    Respiratory:  Negative for cough and shortness of breath.    Cardiovascular:  Negative for chest pain and palpitations.   Gastrointestinal:  Negative for diarrhea, nausea and vomiting.   Genitourinary:  Negative for dysuria, frequency and urgency.   Musculoskeletal:  Positive for arthralgias and back pain. Negative for myalgias.   Skin:  Negative for rash and wound.   Neurological:  Negative for syncope, facial asymmetry, speech difficulty, weakness, light-headedness and headaches.   Psychiatric/Behavioral:  Positive for agitation, confusion and decreased concentration.    Objective:     Vital Signs (Most Recent):  Temp: 97.8 °F (36.6 °C) (09/30/22 1545)  Pulse: 70 (09/30/22 1545)  Resp: 17 (09/30/22 1545)  BP: 120/68 (09/30/22 1545)  SpO2: (!) 94 % (09/30/22 1545) Vital Signs (24h Range):  Temp:  [97.4 °F (36.3 °C)-98.1 °F (36.7 °C)] 97.8 °F (36.6 °C)  Pulse:  [69-74] 70  Resp:  [17-20] 17  SpO2:  [93 %-98 %] 94 %  BP: ()/(55-68) 120/68     Weight: 86.2 kg (190 lb)  Body mass index is 25.77 kg/m².    Intake/Output Summary (Last 24 hours) at 9/30/2022 1605  Last data filed at 9/30/2022 0900  Gross per 24 hour   Intake 720 ml   Output --   Net 720 ml        Physical Exam  Vitals and nursing note reviewed.   Constitutional:       General: He is not in acute distress.     Appearance: He is obese. He is not ill-appearing or diaphoretic.   HENT:      Right Ear: External ear normal.      Left Ear: External ear normal.      Nose: Nose normal.   Eyes:      General:         Right eye: No discharge.         Left eye: No discharge.      Conjunctiva/sclera: Conjunctivae normal.   Cardiovascular:      Rate and Rhythm: Normal rate and regular rhythm.      Heart sounds: Normal heart sounds. No murmur  heard.  Pulmonary:      Effort: Pulmonary effort is normal. No respiratory distress.      Breath sounds: Normal breath sounds. No wheezing or rales.   Abdominal:      General: Abdomen is flat. There is no distension.      Palpations: There is no mass.      Tenderness: There is no abdominal tenderness.   Musculoskeletal:         General: No swelling, tenderness or deformity. Normal range of motion.      Cervical back: Normal range of motion and neck supple.      Right lower leg: No edema.      Left lower leg: No edema.   Skin:     General: Skin is warm.      Coloration: Skin is not jaundiced.      Findings: No bruising.   Neurological:      Mental Status: He is alert.      Cranial Nerves: No cranial nerve deficit.      Motor: Weakness (left sided, LUE weak 2/2 h/o Polio) present.      Comments: Oriented to person and year but not to place  Poor memory recall       Significant Labs: All pertinent labs within the past 24 hours have been reviewed.    Significant Imaging: I have reviewed all pertinent imaging results/findings within the past 24 hours.

## 2022-09-30 NOTE — ASSESSMENT & PLAN NOTE
Chronic anticoagulation use w/ warfarin  S/P pacemaker with multiple replacements    Home meds: Warfarin 7.5 qd, atenolol 50 qd  [id number]      NSGY recommended holding AC due to high risk of intracranial bleed 2/2 small foci of hemorrhage, risks and benefits of holding AC in this patient with paroxysmal atrial fibrillation were discussed with the patient/family who voiced understanding and agreed with the plan    -- Holding home warfarin until NSGY follow-up  -- h/o CVA while off AC for a past colonoscopy  -- Continue home ASA, statin, and atenolol  -- Daily INR   -- Cardiac telemetry  -- Maintain K > 4, Mg > 2, Ca wnl; replete PRN  -- STAT cardiology consult if hemodynamic instability for DCCV evaluation

## 2022-09-30 NOTE — PLAN OF CARE
Problem: Adult Inpatient Plan of Care  Goal: Absence of Hospital-Acquired Illness or Injury  Outcome: Ongoing, Progressing     Problem: Adult Inpatient Plan of Care  Goal: Optimal Comfort and Wellbeing  Outcome: Ongoing, Progressing     Problem: Adult Inpatient Plan of Care  Goal: Readiness for Transition of Care  Outcome: Ongoing, Progressing     Problem: Skin Injury Risk Increased  Goal: Skin Health and Integrity  Outcome: Ongoing, Progressing     Problem: Pain Acute  Goal: Acceptable Pain Control and Functional Ability  9/30/2022 1408 by Bobbi Salcedo RN  Outcome: Ongoing, Progressing  9/30/2022 1248 by Bobbi Salcedo RN  Outcome: Ongoing, Progressing

## 2022-09-30 NOTE — PT/OT/SLP PROGRESS
"Occupational Therapy   Co-Treatment    Name: Kieran Styles  MRN: [id number]  Admitting Diagnosis:  Brain mass  2 Days Post-Op    Recommendations:     Discharge Recommendations: rehabilitation facility  Discharge Equipment Recommendations:  other (see comments) (tbd)  Barriers to discharge:  None    Assessment:     Kieran Styles is a 77 y.o. male with a medical diagnosis of Brain mass.  He presents with the following performance deficits affecting function:  weakness, impaired endurance, impaired self care skills, impaired functional mobility, gait instability, impaired balance, impaired cognition, decreased coordination, decreased upper extremity function, decreased lower extremity function, decreased safety awareness.     Pt agreeable to therapy and tolerated the session well. He transferred to the [facility] with Min A x 2 and required Max A for toileting after a BM. Pt continues to make progress towards his goals. Pt would benefit from continued skilled acute OT services in order to maximize independence and safety with ADLs and functional mobility to ensure safe return to PLOF in the least restrictive environment. OT recommending Rehab once pt is medically appropriate for d/c.     Rehab Prognosis:  Good; patient would benefit from acute skilled OT services to address these deficits and reach maximum level of function.       Plan:     Patient to be seen 3 x/week to address the above listed problems via self-care/home management, therapeutic activities, therapeutic exercises, neuromuscular re-education  Plan of Care Expires: 10/22/22  Plan of Care Reviewed with: spouse, patient    Subjective     'I need to go to the bathroom"     Pain/Comfort:  Pain Rating 1: 0/10    Objective:     Co-evaluation/treatment performed due to patient's multiple deficits requiring two skilled therapists to appropriately and safely assess patient's strength and endurance while facilitating functional tasks in addition to " accommodating for patient's activity tolerance.     Communicated with: RN prior to session.  Patient found HOB elevated with bed alarm upon OT entry to room.    General Precautions: Standard, fall   Orthopedic Precautions:N/A   Braces: N/A  Respiratory Status: Room air     Occupational Performance:     Bed Mobility:    Patient completed Rolling/Turning to Left with  moderate assistance  Patient completed Scooting/Bridging with minimum assistance  Patient completed Supine to Sit with maximal assistance      Functional Mobility/Transfers:  Patient completed Sit <> Stand Transfer with minimum assistance and of 2 persons  with  rolling walker   Patient completed BSC <> Chair Transfer using Step Transfer technique with minimum assistance and of 2 persons with rolling walker  Patient completed Toilet Transfer Step Transfer technique with minimum assistance and of 2 persons with  rolling walker  Functional Mobility: Pt engaging in functional mobility to simulate household/community distances approx 10 ft with Min-Mod A  and utilizing RW in order to maximize functional activity tolerance and standing balance required for engagement in occupations of choice.    Activities of Daily Living:  Grooming: stand by assistance ; to wash face sitting EOB   Toileting: maximal assistance : For posterior hygiene after a BM on the toilet     [facility] 6 Click ADL: 15    Treatment & Education:  Therapist provided facilitation and instruction of proper body mechanics and fall prevention strategies during tasks listed above.  Instructed patient to sit in bedside chair daily to increase OOB/activity tolerance.  Instructed patient to use call light to have nursing staff assist with needs/transfers.  Discussed OT POC and answered all questions within OT scope of practice.  Whiteboard updated       Patient left up in chair with all lines intact, call button in reach, bed alarm on, and wife present    GOALS:   Multidisciplinary Problems        Occupational Therapy Goals          Problem: Occupational Therapy    Goal Priority Disciplines Outcome Interventions   Occupational Therapy Goal     OT, PT/OT Ongoing, Progressing    Description: Goals to be met by: 10/8/22     Patient will increase functional independence with ADLs by performing:    UE Dressing with Set-up Assistance.  Grooming while seated with Supervision.  Toileting from bedside commode with Set-up Assistance for hygiene and clothing management.                          Time Tracking:     OT Date of Treatment: 09/30/22  OT Start Time: 0952  OT Stop Time: 1019  OT Total Time (min): 27 min    Billable Minutes:Self Care/Home Management 17  Therapeutic Activity 10    OT/KIM: OT     KIM Visit Number: 0    9/30/2022

## 2022-09-30 NOTE — ASSESSMENT & PLAN NOTE
Patient is a 77M with PMH of HTN, DM, polio, CAD (on ASA/Coumadin with pacemaker) who presents with multiple falls (including this morning). Patient was transferred from OSH for NSGY for possible brain mass.    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   --CTH with contrast 9/20: interventricular diffuse enhancing mass in lateral vents and 3rd vent   --CT chest/abdo/pelvis 9/21: 1.1 cm solid solitary RUL nodule, additional micronodule, hepatosplenomegaly, nonsepcific lucent lesions in L ilum, L3 and L4   --Cannot do MRI - pacemaker MRI compatible, but leads not   --Repeat CTH w wo con 9/26 shows smaller lesion with less contrast enhancement   --recommend repeat CTH w wo contrast next week  --Follow up LP results, pending flow cytology   -CSF 9/28: r16, 2; w35 64 (lymphocytes 96, 96); g178; p291; Cx NGTD   -flow cytometry negative, cytology pending   -no acute surgical intervention warranted currently  --f/u heme onc recs   -consider biopsy (needle biopsy v. endoscopic biopsy) if necessary based on cytology results  --SBP <160  --Na >135  --Dex 4q6 - can taper off dex now  --PUD PPx while steroid treatment ongoing  --hold coumadin given recent blood on CTH - rec follow up with cardiology for anticoagulation/anti-platelet given CAD  --Recommend PT/OT daily  --Continue to monitor clinically, notify NSGY immediately with any changes in neuro status    Dispo: pending IPR placement per  team, follow up in neurosurgery clinic if discharged

## 2022-09-30 NOTE — ASSESSMENT & PLAN NOTE
77-year-old male with extensive past medical history presenting from [facility] for Neurosurgery evaluation of a brain mass found on CT head.  Per patient wife, patient has had sudden memory problems in the last 2 weeks with recurrent falls, but without loss of consciousness, seizure activity, head trauma.  Baseline is normal without other limitations.  Patient also has been describing 1 year of headache.  Has seen neurology outpatient, and has been taking gabapentin and undergoing nerve block procedures in neck with little effect. 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     Concerns for metastatic disease, possibly lung as primary given extensive smoking history.    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     Patient's pacemaker leads are NOT compatible with MRI    - negative toxoplasma, HIV, and RPR    LP (9/28):  - elevated CSF protein, glucose, lymphocytes  - negative flow cytometry  - pending cytology    Plan:   -- NSGY evaluated, pending recs in regards to surgical intervention  --Dex 4mg q6  --PUD PPx while steroid treatment ongoing, patient on home pantoprazole 40mg qd  --Continue to monitor clinically, notify NSGY immediately with any changes in neuro status  -- ID consulted to r/o infectious etiology of mass   - f/u toxoplasma IgG and IgM, immunodeficiency panel, serum cryptococcus antibody    - IR fluoro guided LP successfully completed on 9/28   - f/u CSF labs: cell count with diff, protein, glucose, gram stain, culture, cytology, and flow  cytometry   - pulmonary nodule bx can be done outpatient with studies (pathology, cytology, aerobic/anaerobic/fungal/AFB cultures)  -- zyprexa 10mg nightly for agitation, 5mg BID PRN for breakthrough  -- Neuro check q4h  -- Fall, Aspiration, Delirium precautions

## 2022-09-30 NOTE — PLAN OF CARE
Problem: Adult Inpatient Plan of Care  Goal: Plan of Care Review  Outcome: Ongoing, Progressing     Problem: Adult Inpatient Plan of Care  Goal: Absence of Hospital-Acquired Illness or Injury  Outcome: Ongoing, Progressing     Problem: Skin Injury Risk Increased  Goal: Skin Health and Integrity  Outcome: Ongoing, Progressing     Problem: Pain Acute  Goal: Acceptable Pain Control and Functional Ability  Outcome: Ongoing, Progressing  POC and meds reviewed with patient and spouse.Patient is lying in bed  ,awake,alert up to bedside commode with assist.All concerns and questions addressed.Bed is in low position, bed rails up x2, brakes on and bed alarm is on.

## 2022-09-30 NOTE — ASSESSMENT & PLAN NOTE
77-year-old male with extensive past medical history presenting from [facility] for Neurosurgery evaluation of a brain mass found on CT head.  Per patient wife, patient has had sudden memory problems in the last 2 weeks with recurrent falls, but without loss of consciousness, seizure activity, head trauma.  Baseline is normal without other limitations.  Patient also has been describing 1 year of headache.  Has seen neurology outpatient, and has been taking gabapentin and undergoing nerve block procedures in neck with little effect. 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     Concerns for metastatic disease, possibly lung as primary given extensive smoking history.    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     Patient's pacemaker leads are NOT compatible with MRI    - negative toxoplasma, cryptococcus, HIV, and RPR    LP (9/28):  - elevated CSF protein, glucose, lymphocytes  - negative flow cytometry  - pending cytology    Plan:   -- NSGY evaluated, pending recs in regards to surgical intervention  -- completed course of dexamethasone and taper  -- PUD PPx during steroids, patient on home pantoprazole 40mg qd  -- NSGY recommended holding AC due to high risk of intracranial bleed 2/2 small foci of hemorrhage, risks and benefits of holding AC in this patient with paroxysmal atrial fibrillation were discussed with the patient/family who voiced understanding and agreed with the plan  -- plan for outpatient CT head early next week, close follow up with  NSGY to manage surgical intervention  -- ID consulted to r/o infectious etiology of mass   - f/u immunodeficiency panel    - IR fluoro guided LP on 9/28   - pulmonary nodule bx per IR can be done outpatient with studies (pathology, cytology, aerobic/anaerobic/fungal/AFB cultures)  -- follow up cytology  -- zyprexa 10mg nightly for agitation, 5mg BID PRN for breakthrough  -- Neuro check q4h  -- Fall, Aspiration, Delirium precautions

## 2022-09-30 NOTE — PT/OT/SLP PROGRESS
Physical Therapy Treatment    Patient Name:  Kieran Styles   MRN:  [id number]    Recommendations:     Discharge Recommendations:  rehabilitation facility   Discharge Equipment Recommendations:  (TBD depending progress)   Barriers to discharge: Decreased caregiver support    Assessment:     Kieran Styles is a 77 y.o. male admitted with a medical diagnosis of Brain mass.  He presents with the following impairments/functional limitations:  weakness, impaired endurance, impaired self care skills, impaired functional mobility, decreased safety awareness, impaired cognition, gait instability, impaired balance. Pt required max A with bed mobility, Min A using RW for gait, and Min A x2 with/without AD for t/fs. Pt will continue to benefit from skilled PT services to improve all deficits noted above. Resume PT POC as indicated.     Rehab Prognosis: Fair; patient would benefit from acute skilled PT services to address these deficits and reach maximum level of function.    Recent Surgery: Procedure(s) (LRB):  Lumbar Puncture (N/A) 2 Days Post-Op    Plan:     During this hospitalization, patient to be seen 4 x/week to address the identified rehab impairments via gait training, therapeutic activities, therapeutic exercises, neuromuscular re-education and progress toward the following goals:    Plan of Care Expires:  10/23/22    Subjective     Chief Complaint: none stated  Patient/Family Comments/goals: none stated  Pain/Comfort:  Pain Rating 1: 0/10  Pain Rating Post-Intervention 1: 0/10      Objective:     Communicated with nursing prior to session.  Patient found HOB elevated with  (all lines intact, bed alarm on and spouse present) upon PT entry to room.     General Precautions: Standard, fall   Orthopedic Precautions:N/A   Braces: N/A  Respiratory Status: Room air     Functional Mobility:  Bed Mobility:  Supine to Sit: maximal assistance  Transfers:  Sit to Stand:  minimum assistance and of 2 persons with no AD  and rolling walker  Bed to Chair: minimum assistance and of 2 persons with  hand-held assist  using  Stand Pivot  Toilet Transfer: minimum assistance and of 2 persons with  hand-held assist  using  Stand Pivot  Gait: ~10ft with RW and Min-Mod A. Distance limited 2/2 pt having to have a BM.  Balance: Pt stood Min A no AD while OT performed bill-care.       AM-PAC 6 CLICK MOBILITY  Turning over in bed (including adjusting bedclothes, sheets and blankets)?: 3  Sitting down on and standing up from a chair with arms (e.g., wheelchair, bedside commode, etc.): 3  Moving from lying on back to sitting on the side of the bed?: 3  Moving to and from a bed to a chair (including a wheelchair)?: 3  Need to walk in hospital room?: 2  Climbing 3-5 steps with a railing?: 1  Basic Mobility Total Score: 15       Therapeutic Activities and Exercises:   -BLE therex x10 reps: LAQ, and Hip Flexion  -Answered all questions/concerns within PTA scope of practice.     Patient left up in chair with all lines intact, call button in reach, chair alarm on, and nursing notified..    GOALS:   Multidisciplinary Problems       Physical Therapy Goals          Problem: Physical Therapy    Goal Priority Disciplines Outcome Goal Variances Interventions   Physical Therapy Goal     PT, PT/OT Ongoing, Progressing     Description: Goals to be met by: 10/8/2022     Patient will increase functional independence with mobility by performin. Supine to sit with MInimal Assistance  2. Sit to stand transfer with Minimal Assistance  3. Bed to chair transfer with Minimal Assistance using LRAD as needed  4. Gait  x 25 feet with Minimal Assistance using LRAD as needed.   5. Sitting at edge of bed x8 minutes with Stand-by Assistance while completing dynamic functional task  6. Lower extremity exercise program x15 reps, with supervision, in order to increase LE strength and (I) with functional mobility.                        Time Tracking:     PT Received On:  09/30/22  PT Start Time: 0953     PT Stop Time: 1018  PT Total Time (min): 25 min     Billable Minutes: Gait Training 8 and Therapeutic Activity 17    Treatment Type: Treatment  PT/PTA: PTA     PTA Visit Number: 1     09/30/2022

## 2022-09-30 NOTE — PROGRESS NOTES
"Charlie Bryan - Neurosurgery ([facility])  [facility] Medicine  Progress Note    Patient Name: Kieran Styles  MRN: [id number]  Patient Class: IP- Inpatient   Admission Date: 9/20/2022  Length of Stay: 10 days  Attending Physician: Moises Mcbride MD  Primary Care Provider: Sony Holt DO        Subjective:     Principal Problem:Brain mass        HPI:  76 yo PMHx DM2, CAD s/p CABG x 2 1997, history of adenomatous colon polyps (07/2017) revealing 10 mm sessile serrated adenoma, GERD, multiple pacemaker replacements most recent 2021 for AF (on warfarin), HTN, HLD, chronic back pain, MARQUIS on CPAP [place] [facility] [place] [facility] due to a 2 week history of sudden memory deficits, disorientation, confsuion (date, situation, short-term memory), and multiple falls w/o head trauma or LOC or seizure. Wife noticed that he was forgetting where he was, what he had been doing. In addition, he has been getting weaker and more unstable on his feet. Yesterday evening, he fell while going to the bathroom and hit his elbow. Transported to EMS to [facility], where they did a CT head 09/20 this AM. Was discharged from [facility] because they initially told the wife that the CT scan was "clear", wife brought him to PCP and cardiologist OP office later that morning, but was called back by [facility] stating that they had mis-read the CT head and that the CT head was remarkable for apparent mass in brain.  Patient was subsequently transferred via EMS to [facility].  [facility] staff informed the wife that they were concerned for possible cancer, and wanted him to see neurosurgery evaluation.  Patient wife denies history cancer to her knowledge.  Patient is an active smoker smoking 1-2 packs per day, and has smoked for the last 30 years.  Social alcohol use, no other illicit substances.    On my initial evaluation, patient is conversational; however, he is only oriented to " self and time.  He exhibits short-term memory loss, and is not sure why he is here.  Overall, patient is a poor historian.  Family was not at bedside.  This history of present illness was obtained via telephone conversation with his wife ([phone] Donna Styles).  ROS includes denies chest pain, abdominal pain, nausea, vomiting, shortness of breath, cough, diarrhea, constipation, blood per rectum, fevers, chills, muscle aches, new weakness, new sensory changes.  Patient reports history of chronic lower back pain.    ED course:  Neurosurgery evaluated at [facility], concerns for metastasis.  No plans for surgical intervention at this time.  Subsequently pan CT scan pending.    PMHx/PSHx:  DM2, CAD s/p CABG x 2 , multiple pacemaker replacements most recent  for AF (on warfarin), HTN, HLD, chronic back pain, MARQUIS on CPAP    FMHx: Mother with breast cancer, . Sister NAFLD w/ cirrhosis, .      Overview/Hospital Course:  Patient presenting with sudden memory deficits, AMS, and multiple falls who was transferred from [facility] to [facility] for NSGY evaluation of the CT head () finding of a brain mass concerning for cancer. No plans for surgical intervention at this time. CT A/P with con demonstrated a pulmonary nodule, bony lesions in left ilium, L3 and L4 vertebral bodies concerning for metastasis. Patient's pacemaker is compatible with MRI; however, the leads are not therefore patient cannot undergo MRI. NSGY palnning for CT head on  to assess for interval change. ID consulted and believes brain mass with pulmonary nodule is likely 2/2 malignancy given h/o significant smoking and recommended IR bx of pulmonary nodule in addition to further infectious w/u. LP with IR under fluoro successfully completed on , CSF studies remarkable for lymphocytic pleocytosis, elevated protein, glucose, and negative flow cytometry. Pending cytology results from LP then NSGY will proceed with  determining if a surgical plan is necessary. CT head per NSGY planned for next week. Patient medically stable for discharge to inpatient rehab, remainder of workup to be done outpatient. IR recommended pulmonary nodule bx outpatient.      Interval History: Patient successful in having a bowel movement     Review of Systems   Constitutional:  Negative for chills and fever.   HENT:  Negative for rhinorrhea, sneezing, sore throat and trouble swallowing.    Respiratory:  Negative for cough and shortness of breath.    Cardiovascular:  Negative for chest pain and palpitations.   Gastrointestinal:  Negative for diarrhea, nausea and vomiting.   Genitourinary:  Negative for dysuria, frequency and urgency.   Musculoskeletal:  Positive for arthralgias and back pain. Negative for myalgias.   Skin:  Negative for rash and wound.   Neurological:  Negative for syncope, facial asymmetry, speech difficulty, weakness, light-headedness and headaches.   Psychiatric/Behavioral:  Positive for agitation, confusion and decreased concentration.    Objective:     Vital Signs (Most Recent):  Temp: 97.8 °F (36.6 °C) (09/30/22 1545)  Pulse: 70 (09/30/22 1545)  Resp: 17 (09/30/22 1545)  BP: 120/68 (09/30/22 1545)  SpO2: (!) 94 % (09/30/22 1545) Vital Signs (24h Range):  Temp:  [97.4 °F (36.3 °C)-98.1 °F (36.7 °C)] 97.8 °F (36.6 °C)  Pulse:  [69-74] 70  Resp:  [17-20] 17  SpO2:  [93 %-98 %] 94 %  BP: ()/(55-68) 120/68     Weight: 86.2 kg (190 lb)  Body mass index is 25.77 kg/m².    Intake/Output Summary (Last 24 hours) at 9/30/2022 1605  Last data filed at 9/30/2022 0900  Gross per 24 hour   Intake 720 ml   Output --   Net 720 ml        Physical Exam  Vitals and nursing note reviewed.   Constitutional:       General: He is not in acute distress.     Appearance: He is obese. He is not ill-appearing or diaphoretic.   HENT:      Right Ear: External ear normal.      Left Ear: External ear normal.      Nose: Nose normal.   Eyes:      General:          Right eye: No discharge.         Left eye: No discharge.      Conjunctiva/sclera: Conjunctivae normal.   Cardiovascular:      Rate and Rhythm: Normal rate and regular rhythm.      Heart sounds: Normal heart sounds. No murmur heard.  Pulmonary:      Effort: Pulmonary effort is normal. No respiratory distress.      Breath sounds: Normal breath sounds. No wheezing or rales.   Abdominal:      General: Abdomen is flat. There is no distension.      Palpations: There is no mass.      Tenderness: There is no abdominal tenderness.   Musculoskeletal:         General: No swelling, tenderness or deformity. Normal range of motion.      Cervical back: Normal range of motion and neck supple.      Right lower leg: No edema.      Left lower leg: No edema.   Skin:     General: Skin is warm.      Coloration: Skin is not jaundiced.      Findings: No bruising.   Neurological:      Mental Status: He is alert.      Cranial Nerves: No cranial nerve deficit.      Motor: Weakness (left sided, LUE weak 2/2 h/o Polio) present.      Comments: Oriented to person and year but not to place  Poor memory recall       Significant Labs: All pertinent labs within the past 24 hours have been reviewed.    Significant Imaging: I have reviewed all pertinent imaging results/findings within the past 24 hours.      Assessment/Plan:      * Brain mass  77-year-old male with extensive past medical history presenting from [facility] for Neurosurgery evaluation of a brain mass found on CT head.  Per patient wife, patient has had sudden memory problems in the last 2 weeks with recurrent falls, but without loss of consciousness, seizure activity, head trauma.  Baseline is normal without other limitations.  Patient also has been describing 1 year of headache.  Has seen neurology outpatient, and has been taking gabapentin and undergoing nerve block procedures in neck with little effect. CT Head woc at OSH demonstrated ependymal/subependymal mass along  the left lateral ventricle and extending at least to midline at the 3rd ventricle.     Concerns for metastatic disease, possibly lung as primary given extensive smoking history.    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     Patient's pacemaker leads are NOT compatible with MRI    - negative toxoplasma, HIV, and RPR    LP (9/28):  - elevated CSF protein, glucose, lymphocytes  - negative flow cytometry  - pending cytology    Plan:   -- NSGY evaluated, pending recs in regards to surgical intervention  --Dex 4mg taper off  --PUD PPx while steroid treatment ongoing, patient on home pantoprazole 40mg qd  --Continue to monitor clinically, notify NSGY immediately with any changes in neuro status  -- ID consulted to r/o infectious etiology of mass   - f/u toxoplasma IgG and IgM, immunodeficiency panel, serum cryptococcus antibody    - IR fluoro guided LP successfully completed on 9/28   - f/u CSF labs: cell count with diff, protein, glucose, gram stain, culture, cytology, and flow cytometry   - pulmonary nodule bx can be done outpatient with studies (pathology, cytology, aerobic/anaerobic/fungal/AFB cultures)  -- zyprexa 10mg nightly for agitation, 5mg BID PRN for breakthrough  -- Neuro check q4h  -- Fall, Aspiration, Delirium precautions    Dermatitis associated with moisture  Wound care consulted, appreciate recs      COPD (chronic obstructive pulmonary disease)  On Anoro. Rx of singular but not taking.    -- Continue formulary equivalent of inhaler  -- PRN duoneb  -- SpO2 goal 88-92       Restless leg  -- Continue home ropinirole       GERD (gastroesophageal reflux  disease)  -- Continue home PPI      Chronic back pain  -- MM pain regimen PRN   Tylenol PRN   Home Narco PRN   Lidocaine patches      Hypertension  Home medications: atenolol 50 qd, losartan-HCTZ 100-25 mg qd    -- Continue home medications as tolerated      MARQUIS on CPAP  -- Maintain nightly CPAP  -- Patient wife to bring home CPAP      Pacemaker  Pacemaker is compatible with MRI but the leads are NOT compatible with MRI      Atrial fibrillation  Chronic anticoagulation use w/ warfarin  S/P pacemaker with multiple replacements    Home meds: Warfarin 7.5 qd, atenolol 50 qd  [id number]      -- Discontinued home warfarin in the case of NSGY intervention  -- Lovenox bridge while off warfarin   -- h/o stroke while off AC for a past colonoscopy  -- Continue home ASA, statin, and atenolol  -- Daily INR   -- Cardiac telemetry  -- Maintain K > 4, Mg > 2, Ca wnl; replete PRN  -- STAT cardiology consult if hemodynamic instability for DCCV evaluation        Hyperlipidemia  - See CAD involving coronary bypass graft      Coronary artery disease involving coronary bypass graft  CABG x 2 1997  Dyslipidemia    -- Continue ASA 81  -- Continue HI statin    Type 2 diabetes mellitus, without long-term current use of insulin  Patient's FSGs are controlled on current medication regimen.  Last A1c reviewed-   Lab Results   Component Value Date    HGBA1C 6.7 (H) 09/20/2022     Most recent fingerstick glucose reviewed-   Recent Labs   Lab 09/28/22  1719 09/28/22  2055 09/29/22  0712 09/29/22  1155   POCTGLUCOSE 212* 316* 253* 304*     Current correctional scale  Medium  Maintain anti-hyperglycemic dose as follows-   Antihyperglycemics (From admission, onward)      Start     Stop Route Frequency Ordered    09/29/22 2100  insulin detemir U-100 pen 19 Units         -- SubQ Nightly 09/29/22 1529    09/29/22 1645  insulin aspart U-100 pen 12 Units         -- SubQ 3 times daily with meals 09/29/22 1529    09/20/22 2250  insulin aspart U-100 pen  1-10 Units         -- SubQ Before meals & nightly PRN 09/20/22 2152        Home medications: metformin, pioglitazone, Victoza    -- Hold oral hyperglycemics  -- SSI, POCT BG qACHS; titrate basal/bolus regimen PRN to 140-180 goal      VTE Risk Mitigation (From admission, onward)           Ordered     enoxaparin injection 90 mg  2 times daily         09/28/22 1503     Reason for No Pharmacological VTE Prophylaxis  Once        Question:  Reasons:  Answer:  Already adequately anticoagulated on oral Anticoagulants    09/20/22 2152     IP VTE HIGH RISK PATIENT  Once         09/20/22 2152     Place sequential compression device  Until discontinued         09/20/22 2039                    Discharge Planning   ANITA: 9/30/2022     Code Status: Full Code   Is the patient medically ready for discharge?: No    Reason for patient still in hospital (select all that apply): Pending disposition  Discharge Plan A: Rehab   Discharge Delays: None known at this time              Sarah Llanos MD  Department of Hospital Medicine   [facility] Neurosurgery ([facility])

## 2022-09-30 NOTE — SUBJECTIVE & OBJECTIVE
Interval History: 9/30: NAEON. Neuro stable. Up and OOB in chair, conversational. Pending cytology results from LP to help determine primary CNS lymphoma.    Medications:  Continuous Infusions:  Scheduled Meds:   aspirin  81 mg Oral Daily    atenoloL  50 mg Oral Daily    atorvastatin  40 mg Oral QHS    bisacodyL  10 mg Rectal Daily    dexamethasone  4 mg Intravenous Q12H    enoxaparin  1 mg/kg Subcutaneous BID    fluticasone furoate-vilanteroL  1 puff Inhalation Daily    furosemide  20 mg Oral Daily    insulin aspart U-100  12 Units Subcutaneous TIDWM    insulin detemir U-100  19 Units Subcutaneous QHS    lactulose  20 g Oral TID    LIDOcaine  1 patch Transdermal Q24H    nicotine  1 patch Transdermal Daily    OLANZapine  10 mg Oral QHS    pantoprazole  40 mg Oral Daily    polyethylene glycol  17 g Oral BID    rOPINIRole  1 mg Oral QHS    senna-docusate 8.6-50 mg  2 tablet Oral Daily     PRN Meds:acetaminophen, albuterol-ipratropium, aluminum-magnesium hydroxide-simethicone, dextrose 10%, dextrose 10%, glucagon (human recombinant), glucose, glucose, HYDROcodone-acetaminophen, hydrOXYzine HCL, insulin aspart U-100, melatonin, naloxone, OLANZapine, sodium chloride 0.9%, sodium chloride 0.9%     Review of Systems  Objective:     Weight: 86.2 kg (190 lb)  Body mass index is 25.77 kg/m².  Vital Signs (Most Recent):  Temp: 97.4 °F (36.3 °C) (09/30/22 0738)  Pulse: 70 (09/30/22 0808)  Resp: 18 (09/30/22 0808)  BP: 117/62 (09/30/22 0738)  SpO2: 98 % (09/30/22 0808) Vital Signs (24h Range):  Temp:  [96 °F (35.6 °C)-97.8 °F (36.6 °C)] 97.4 °F (36.3 °C)  Pulse:  [69-75] 70  Resp:  [18] 18  SpO2:  [91 %-98 %] 98 %  BP: ()/(56-62) 117/62                            Physical Exam    Neurosurgery Physical Exam  General: AOx2-3, GCS E4V4M6, fluctuating confusion  CNII-XII: Intact on fine exam, PERRL, visual fields grossly intact, EOMI, facial sensation preserved, no facial asymmetry, tongue midline, shoulder shrug equal, no  pronator drift  Extremities: LUE contracted at baseline (4/5 motor), 5/5 motor otherwise, sensorium intact throughout, coordination intact throughout, DTRs 2+, no pathological reflexes, no sensory level present    Significant Labs:  Recent Labs   Lab 09/29/22 0323 09/30/22  0513   * 187*    137   K 4.3 3.9    103   CO2 22* 23   BUN 28* 36*   CREATININE 0.9 1.0   CALCIUM 9.6 9.1   MG 1.9 1.9       Recent Labs   Lab 09/29/22 0323 09/30/22  0513   WBC 14.88* 16.24*   HGB 16.0 14.9   HCT 48.4 46.1    192       Recent Labs   Lab 09/29/22 0323 09/30/22  0513   INR 1.2 1.3*       Microbiology Results (last 7 days)       Procedure Component Value Units Date/Time    CSF culture [[id number]] Collected: 09/28/22 1103    Order Status: Completed Specimen: CSF (Spinal Fluid) from CSF Tap, Tube 3 Updated: 09/30/22 0729     CSF CULTURE No Growth to date     Gram Stain Result No WBC's      No organisms seen    Blood culture [[id number]] Collected: 09/23/22 1546    Order Status: Completed Specimen: Blood from Peripheral, Left Arm Updated: 09/28/22 1812     Blood Culture, Routine No growth after 5 days.    Blood culture [[id number]] Collected: 09/23/22 1546    Order Status: Completed Specimen: Blood from Peripheral, Right Arm Updated: 09/28/22 1812     Blood Culture, Routine No growth after 5 days.    Gram stain [[id number]] Collected: 09/28/22 1103    Order Status: Sent Specimen: CSF (Spinal Fluid) from CSF Tap, Tube 3 Updated: 09/28/22 1116          All pertinent labs from the last 24 hours have been reviewed.    Significant Diagnostics:  I have reviewed all pertinent imaging results/findings within the past 24 hours.

## 2022-09-30 NOTE — PLAN OF CARE
Following up on post-acute plan.      Pt/family's choice for rehab is: 1)[facility], 2)[facility].      0930: Spoke to Lindsey/admissions at [facility]. She stated that they do not have a bed available until Wednesday.    Called [facility] rehab [place]/Kath.  She stated that they need updated clinical notes and she will review for possible admission.  Sent updated notes in [facility]. Spoke to patient and his wife at bedside, [facility].    1430: [facility] can accept, however no bed available until Monday. Informed MD team.      Rehab contact info:  [facility] IRF: [phone], [place] ([phone]), [place] [phone],  fax [phone] [address] [place] [id number] (fax number for orders/nursing station: [phone])    [facility] [address] [place] [address]  Phone: [phone]      Mari Hastings  RN Case Manager  [facility]  09/30/2022

## 2022-09-30 NOTE — PROGRESS NOTES
Charlie Bryan - Neurosurgery ([facility])  Neurosurgery  Progress Note    Subjective:     History of Present Illness: Patient is a 77M with PMH of HTN, DM, polio, CAD (on ASA/Coumadin) who presents with multiple falls (including this morning). Patient was transferred from OSH for NSGY for possible brain mass.    Patient has a pacemaker so it is uncertain whether or not he can get an MRI. He is originally from [facility] and was transferred here for care. Patient denies head trauma with his fall this morning. He notes that he has started using a walker in the past few weeks. Patient complains of L leg pain, likely related to his fall. 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      Post-Op Info:  Procedure(s) (LRB):  Lumbar Puncture (N/A)   2 Days Post-Op     Interval History: 9/30: NAEON. Neuro stable. Up and OOB in chair, conversational. Pending cytology results from LP to help determine primary CNS lymphoma.    Medications:  Continuous Infusions:  Scheduled Meds:   aspirin  81 mg Oral Daily    atenoloL  50 mg Oral Daily    atorvastatin  40 mg Oral QHS    bisacodyL  10 mg Rectal Daily    dexamethasone  4 mg Intravenous Q12H    enoxaparin  1 mg/kg Subcutaneous BID    fluticasone furoate-vilanteroL  1 puff Inhalation Daily    furosemide  20 mg Oral Daily    insulin aspart U-100  12 Units Subcutaneous TIDWM    insulin detemir U-100  19 Units Subcutaneous QHS    lactulose  20 g Oral TID    LIDOcaine  1 patch Transdermal Q24H    nicotine  1 patch Transdermal Daily    OLANZapine  10 mg Oral QHS    pantoprazole  40 mg Oral Daily    polyethylene glycol  17 g Oral BID    rOPINIRole  1 mg Oral QHS    senna-docusate 8.6-50 mg  2 tablet Oral Daily     PRN Meds:acetaminophen, albuterol-ipratropium, aluminum-magnesium hydroxide-simethicone, dextrose 10%, dextrose 10%, glucagon (human recombinant), glucose, glucose, HYDROcodone-acetaminophen, hydrOXYzine HCL, insulin aspart U-100,  melatonin, naloxone, OLANZapine, sodium chloride 0.9%, sodium chloride 0.9%     Review of Systems  Objective:     Weight: 86.2 kg (190 lb)  Body mass index is 25.77 kg/m².  Vital Signs (Most Recent):  Temp: 97.4 °F (36.3 °C) (09/30/22 0738)  Pulse: 70 (09/30/22 0808)  Resp: 18 (09/30/22 0808)  BP: 117/62 (09/30/22 0738)  SpO2: 98 % (09/30/22 0808) Vital Signs (24h Range):  Temp:  [96 °F (35.6 °C)-97.8 °F (36.6 °C)] 97.4 °F (36.3 °C)  Pulse:  [69-75] 70  Resp:  [18] 18  SpO2:  [91 %-98 %] 98 %  BP: ()/(56-62) 117/62                            Physical Exam    Neurosurgery Physical Exam  General: AOx2-3, GCS E4V4M6, fluctuating confusion  CNII-XII: Intact on fine exam, PERRL, visual fields grossly intact, EOMI, facial sensation preserved, no facial asymmetry, tongue midline, shoulder shrug equal, no pronator drift  Extremities: LUE contracted at baseline (4/5 motor), 5/5 motor otherwise, sensorium intact throughout, coordination intact throughout, DTRs 2+, no pathological reflexes, no sensory level present    Significant Labs:  Recent Labs   Lab 09/29/22 0323 09/30/22  0513   * 187*    137   K 4.3 3.9    103   CO2 22* 23   BUN 28* 36*   CREATININE 0.9 1.0   CALCIUM 9.6 9.1   MG 1.9 1.9       Recent Labs   Lab 09/29/22 0323 09/30/22  0513   WBC 14.88* 16.24*   HGB 16.0 14.9   HCT 48.4 46.1    192       Recent Labs   Lab 09/29/22 0323 09/30/22  0513   INR 1.2 1.3*       Microbiology Results (last 7 days)       Procedure Component Value Units Date/Time    CSF culture [[id number]] Collected: 09/28/22 1103    Order Status: Completed Specimen: CSF (Spinal Fluid) from CSF Tap, Tube 3 Updated: 09/30/22 0729     CSF CULTURE No Growth to date     Gram Stain Result No WBC's      No organisms seen    Blood culture [[id number]] Collected: 09/23/22 1546    Order Status: Completed Specimen: Blood from Peripheral, Left Arm Updated: 09/28/22 1812     Blood Culture, Routine No growth after 5 days.     Blood culture [[id number]] Collected: 09/23/22 1546    Order Status: Completed Specimen: Blood from Peripheral, Right Arm Updated: 09/28/22 1812     Blood Culture, Routine No growth after 5 days.    Gram stain [[id number]] Collected: 09/28/22 1103    Order Status: Sent Specimen: CSF (Spinal Fluid) from CSF Tap, Tube 3 Updated: 09/28/22 1116          All pertinent labs from the last 24 hours have been reviewed.    Significant Diagnostics:  I have reviewed all pertinent imaging results/findings within the past 24 hours.    Assessment/Plan:     * Brain mass  Patient is a 77M with PMH of HTN, DM, polio, CAD (on ASA/Coumadin with pacemaker) who presents with multiple falls (including this morning). Patient was transferred from OSH for NSGY for possible brain mass.    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   --CTH with contrast 9/20: interventricular diffuse enhancing mass in lateral vents and 3rd vent   --CT chest/abdo/pelvis 9/21: 1.1 cm solid solitary RUL nodule, additional micronodule, hepatosplenomegaly, nonsepcific lucent lesions in L ilum, L3 and L4   --Cannot do MRI - pacemaker MRI compatible, but leads not   --Repeat CTH w wo con 9/26 shows smaller lesion with less contrast enhancement   --recommend repeat CTH w wo contrast next week  --Follow up LP results, pending flow cytology   -CSF 9/28: r16, 2; w35 64 (lymphocytes 96, 96); g178; p291; Cx NGTD   -flow cytometry negative, cytology pending   -no acute surgical intervention warranted currently  --f/u heme onc recs   -consider biopsy (needle biopsy v. endoscopic biopsy) if necessary based on cytology results  --SBP <160  --Na >135  --Dex 4q6 - can taper off dex now  --PUD PPx while steroid treatment ongoing  --hold coumadin given recent blood on CTH - rec follow up with cardiology for anticoagulation/anti-platelet given CAD  --Recommend  PT/OT daily  --Continue to monitor clinically, notify NSGY immediately with any changes in neuro status    Dispo: pending IPR placement per [facility] team, follow up in neurosurgery clinic if discharged        Bonita Fontaine MD  Neurosurgery  [facility] - Neurosurgery ([facility])

## 2022-10-01 VITALS
BODY MASS INDEX: 25.73 KG/M2 | OXYGEN SATURATION: 97 % | WEIGHT: 190 LBS | DIASTOLIC BLOOD PRESSURE: 62 MMHG | HEIGHT: 72 IN | HEART RATE: 79 BPM | SYSTOLIC BLOOD PRESSURE: 115 MMHG | RESPIRATION RATE: 18 BRPM | TEMPERATURE: 97 F

## 2022-10-01 LAB
ALBUMIN SERPL BCP-MCNC: 3.4 G/DL (ref 3.5–5.2)
ALP SERPL-CCNC: 54 U/L (ref 55–135)
ALT SERPL W/O P-5'-P-CCNC: 16 U/L (ref 10–44)
ANION GAP SERPL CALC-SCNC: 9 MMOL/L (ref 8–16)
AST SERPL-CCNC: 12 U/L (ref 10–40)
BASOPHILS # BLD AUTO: 0.04 K/UL (ref 0–0.2)
BASOPHILS NFR BLD: 0.3 % (ref 0–1.9)
BILIRUB SERPL-MCNC: 0.8 MG/DL (ref 0.1–1)
BUN SERPL-MCNC: 31 MG/DL (ref 8–23)
CALCIUM SERPL-MCNC: 8.9 MG/DL (ref 8.7–10.5)
CHLORIDE SERPL-SCNC: 102 MMOL/L (ref 95–110)
CO2 SERPL-SCNC: 24 MMOL/L (ref 23–29)
CREAT SERPL-MCNC: 0.9 MG/DL (ref 0.5–1.4)
DIFFERENTIAL METHOD: ABNORMAL
EOSINOPHIL # BLD AUTO: 0.2 K/UL (ref 0–0.5)
EOSINOPHIL NFR BLD: 1.3 % (ref 0–8)
ERYTHROCYTE [DISTWIDTH] IN BLOOD BY AUTOMATED COUNT: 16.1 % (ref 11.5–14.5)
EST. GFR  (NO RACE VARIABLE): >60 ML/MIN/1.73 M^2
GLUCOSE SERPL-MCNC: 150 MG/DL (ref 70–110)
HCT VFR BLD AUTO: 47.7 % (ref 40–54)
HGB BLD-MCNC: 15.7 G/DL (ref 14–18)
IMM GRANULOCYTES # BLD AUTO: 0.4 K/UL (ref 0–0.04)
IMM GRANULOCYTES NFR BLD AUTO: 2.8 % (ref 0–0.5)
INR PPP: 1.1 (ref 0.8–1.2)
LYMPHOCYTES # BLD AUTO: 2.1 K/UL (ref 1–4.8)
LYMPHOCYTES NFR BLD: 14.7 % (ref 18–48)
MAGNESIUM SERPL-MCNC: 2 MG/DL (ref 1.6–2.6)
MCH RBC QN AUTO: 30.1 PG (ref 27–31)
MCHC RBC AUTO-ENTMCNC: 32.9 G/DL (ref 32–36)
MCV RBC AUTO: 91 FL (ref 82–98)
MONOCYTES # BLD AUTO: 0.9 K/UL (ref 0.3–1)
MONOCYTES NFR BLD: 6.5 % (ref 4–15)
NEUTROPHILS # BLD AUTO: 10.8 K/UL (ref 1.8–7.7)
NEUTROPHILS NFR BLD: 74.4 % (ref 38–73)
NRBC BLD-RTO: 0 /100 WBC
PHOSPHATE SERPL-MCNC: 2.6 MG/DL (ref 2.7–4.5)
PLATELET # BLD AUTO: 171 K/UL (ref 150–450)
PMV BLD AUTO: 11.6 FL (ref 9.2–12.9)
POCT GLUCOSE: 151 MG/DL (ref 70–110)
POCT GLUCOSE: 233 MG/DL (ref 70–110)
POCT GLUCOSE: 316 MG/DL (ref 70–110)
POTASSIUM SERPL-SCNC: 4 MMOL/L (ref 3.5–5.1)
PROT SERPL-MCNC: 6.3 G/DL (ref 6–8.4)
PROTHROMBIN TIME: 11.6 SEC (ref 9–12.5)
RBC # BLD AUTO: 5.22 M/UL (ref 4.6–6.2)
SODIUM SERPL-SCNC: 135 MMOL/L (ref 136–145)
WBC # BLD AUTO: 14.45 K/UL (ref 3.9–12.7)

## 2022-10-01 PROCEDURE — 83735 ASSAY OF MAGNESIUM: CPT

## 2022-10-01 PROCEDURE — 99232 SBSQ HOSP IP/OBS MODERATE 35: CPT | Mod: ,,, | Performed by: NEUROLOGICAL SURGERY

## 2022-10-01 PROCEDURE — 94660 CPAP INITIATION&MGMT: CPT

## 2022-10-01 PROCEDURE — 94640 AIRWAY INHALATION TREATMENT: CPT

## 2022-10-01 PROCEDURE — 25000003 PHARM REV CODE 250

## 2022-10-01 PROCEDURE — 94761 N-INVAS EAR/PLS OXIMETRY MLT: CPT

## 2022-10-01 PROCEDURE — 63600175 PHARM REV CODE 636 W HCPCS

## 2022-10-01 PROCEDURE — 85610 PROTHROMBIN TIME: CPT

## 2022-10-01 PROCEDURE — 85025 COMPLETE CBC W/AUTO DIFF WBC: CPT

## 2022-10-01 PROCEDURE — 99239 HOSP IP/OBS DSCHRG MGMT >30: CPT | Mod: GC,,, | Performed by: HOSPITALIST

## 2022-10-01 PROCEDURE — 84100 ASSAY OF PHOSPHORUS: CPT

## 2022-10-01 PROCEDURE — 99232 PR SUBSEQUENT HOSPITAL CARE,LEVL II: ICD-10-PCS | Mod: ,,, | Performed by: NEUROLOGICAL SURGERY

## 2022-10-01 PROCEDURE — 80053 COMPREHEN METABOLIC PANEL: CPT

## 2022-10-01 PROCEDURE — 99239 PR HOSPITAL DISCHARGE DAY,>30 MIN: ICD-10-PCS | Mod: GC,,, | Performed by: HOSPITALIST

## 2022-10-01 PROCEDURE — 36415 COLL VENOUS BLD VENIPUNCTURE: CPT

## 2022-10-01 PROCEDURE — S4991 NICOTINE PATCH NONLEGEND: HCPCS

## 2022-10-01 RX ORDER — ATORVASTATIN CALCIUM 40 MG/1
40 TABLET, FILM COATED ORAL NIGHTLY
Qty: 90 TABLET | Refills: 3 | Status: SHIPPED | OUTPATIENT
Start: 2022-10-01 | End: 2023-10-01

## 2022-10-01 RX ORDER — DEXAMETHASONE 4 MG/1
4 TABLET ORAL 2 TIMES DAILY WITH MEALS
Qty: 4 TABLET | Refills: 0 | Status: SHIPPED | OUTPATIENT
Start: 2022-10-01 | End: 2022-10-01 | Stop reason: HOSPADM

## 2022-10-01 RX ORDER — SODIUM,POTASSIUM PHOSPHATES 280-250MG
2 POWDER IN PACKET (EA) ORAL EVERY 4 HOURS
Status: COMPLETED | OUTPATIENT
Start: 2022-10-01 | End: 2022-10-01

## 2022-10-01 RX ORDER — OLANZAPINE 2.5 MG/1
10 TABLET ORAL NIGHTLY
Status: DISCONTINUED | OUTPATIENT
Start: 2022-10-01 | End: 2022-10-01 | Stop reason: HOSPADM

## 2022-10-01 RX ORDER — DEXAMETHASONE SODIUM PHOSPHATE 4 MG/ML
4 INJECTION, SOLUTION INTRA-ARTICULAR; INTRALESIONAL; INTRAMUSCULAR; INTRAVENOUS; SOFT TISSUE EVERY 12 HOURS
Qty: 4 ML | Refills: 0 | Status: SHIPPED | OUTPATIENT
Start: 2022-10-01 | End: 2022-10-01 | Stop reason: HOSPADM

## 2022-10-01 RX ORDER — WARFARIN SODIUM 5 MG/1
5 TABLET ORAL
Qty: 90 TABLET | Refills: 0 | Status: SHIPPED | OUTPATIENT
Start: 2022-10-03

## 2022-10-01 RX ADMIN — LIDOCAINE 1 PATCH: 50 PATCH CUTANEOUS at 12:10

## 2022-10-01 RX ADMIN — ASPIRIN 81 MG: 81 TABLET, COATED ORAL at 09:10

## 2022-10-01 RX ADMIN — INSULIN ASPART 4 UNITS: 100 INJECTION, SOLUTION INTRAVENOUS; SUBCUTANEOUS at 11:10

## 2022-10-01 RX ADMIN — ATENOLOL 50 MG: 25 TABLET ORAL at 09:10

## 2022-10-01 RX ADMIN — PANTOPRAZOLE SODIUM 40 MG: 40 TABLET, DELAYED RELEASE ORAL at 09:10

## 2022-10-01 RX ADMIN — DEXAMETHASONE SODIUM PHOSPHATE 4 MG: 4 INJECTION INTRA-ARTICULAR; INTRALESIONAL; INTRAMUSCULAR; INTRAVENOUS; SOFT TISSUE at 12:10

## 2022-10-01 RX ADMIN — INSULIN ASPART 12 UNITS: 100 INJECTION, SOLUTION INTRAVENOUS; SUBCUTANEOUS at 07:10

## 2022-10-01 RX ADMIN — FUROSEMIDE 20 MG: 20 TABLET ORAL at 09:10

## 2022-10-01 RX ADMIN — HYDROCODONE BITARTRATE AND ACETAMINOPHEN 1 TABLET: 10; 325 TABLET ORAL at 02:10

## 2022-10-01 RX ADMIN — FLUTICASONE FUROATE AND VILANTEROL TRIFENATATE 1 PUFF: 100; 25 POWDER RESPIRATORY (INHALATION) at 08:10

## 2022-10-01 RX ADMIN — INSULIN ASPART 12 UNITS: 100 INJECTION, SOLUTION INTRAVENOUS; SUBCUTANEOUS at 04:10

## 2022-10-01 RX ADMIN — ENOXAPARIN SODIUM 90 MG: 100 INJECTION SUBCUTANEOUS at 09:10

## 2022-10-01 RX ADMIN — POTASSIUM & SODIUM PHOSPHATES POWDER PACK 280-160-250 MG 2 PACKET: 280-160-250 PACK at 02:10

## 2022-10-01 RX ADMIN — INSULIN ASPART 12 UNITS: 100 INJECTION, SOLUTION INTRAVENOUS; SUBCUTANEOUS at 11:10

## 2022-10-01 RX ADMIN — POTASSIUM & SODIUM PHOSPHATES POWDER PACK 280-160-250 MG 2 PACKET: 280-160-250 PACK at 10:10

## 2022-10-01 RX ADMIN — HYDROCODONE BITARTRATE AND ACETAMINOPHEN 1 TABLET: 10; 325 TABLET ORAL at 07:10

## 2022-10-01 RX ADMIN — Medication 1 PATCH: at 09:10

## 2022-10-01 RX ADMIN — OLANZAPINE 10 MG: 2.5 TABLET, FILM COATED ORAL at 06:10

## 2022-10-01 RX ADMIN — INSULIN ASPART 8 UNITS: 100 INJECTION, SOLUTION INTRAVENOUS; SUBCUTANEOUS at 04:10

## 2022-10-01 NOTE — ASSESSMENT & PLAN NOTE
Patient's FSGs are controlled on current medication regimen. Hyperglycemia likely 2/2 steroids. Will discharge on home regiment.   Last A1c reviewed-   Lab Results   Component Value Date    HGBA1C 6.7 (H) 09/20/2022     Most recent fingerstick glucose reviewed-   Recent Labs   Lab 09/30/22  1654 09/30/22  2049 10/01/22  0724 10/01/22  1123   POCTGLUCOSE 214* 332* 151* 233*     Current correctional scale  Medium  Maintain anti-hyperglycemic dose as follows-   Antihyperglycemics (From admission, onward)    Start     Stop Route Frequency Ordered    09/29/22 2100  insulin detemir U-100 pen 19 Units         -- SubQ Nightly 09/29/22 1529    09/29/22 1645  insulin aspart U-100 pen 12 Units         -- SubQ 3 times daily with meals 09/29/22 1529    09/20/22 2250  insulin aspart U-100 pen 1-10 Units         -- SubQ Before meals & nightly PRN 09/20/22 2152      Home medications: metformin, pioglitazone, Victoza    -- Hold oral hyperglycemics  -- SSI, POCT BG qACHS; titrate basal/bolus regimen PRN to 140-180 goal

## 2022-10-01 NOTE — PLAN OF CARE
[facility]    FACILITY TRANSFER ORDERS      Patient Name: Kieran Styles  YOB: 1945    PCP: Sony Holt DO   PCP Address: [address] [place] [address] / [place] [address]  PCP Phone Number: [phone]  PCP Fax: [phone]    Encounter Date: 10/01/2022    Admit to: IPR    Vital Signs:  Routine    Diagnoses:   Active Hospital Problems    Diagnosis  POA    *Brain mass [G93.89]  Unknown    Dermatitis associated with moisture [L30.8]  Yes    Type 2 diabetes mellitus, without long-term current use of insulin [E11.9]  Unknown    Coronary artery disease involving coronary bypass graft [I25.810]  Unknown    Hyperlipidemia [E78.5]  Unknown    Atrial fibrillation [I48.91]  Unknown    Pacemaker [Z95.0]  Unknown    MARQUIS on CPAP [G47.33, Z99.89]  Not Applicable    Hypertension [I10]  Unknown    Chronic back pain [M54.9, G89.29]  Unknown    GERD (gastroesophageal reflux disease) [K21.9]  Unknown    Restless leg [G25.81]  Unknown    COPD (chronic obstructive pulmonary disease) [J44.9]  Unknown      Resolved Hospital Problems    Diagnosis Date Resolved POA    Constipation [K59.00] 09/30/2022 Unknown       Allergies:  Review of patient's allergies indicates:   Allergen Reactions    Penicillins     Sulfa (sulfonamide antibiotics)        Diet: diabetic diet: 1800 calorie    Activities: Activity as tolerated    Goals of Care Treatment Preferences:  Code Status: Full Code      Nursing: Routine      Labs: BMP and INR Daily for 5 days then regular Warfarin INR monitoring   IMAGING: CTH w wo contrast on week of 10/3     CONSULTS:    Physical Therapy to evaluate and treat. , Occupational Therapy to evaluate and treat., and  to evaluate for community resources/long-range planning.  Referral: Neurosurgery and hematology oncology     MISCELLANEOUS CARE:  Routine Skin for Bedridden Patients: Apply moisture barrier cream to all skin folds and wet areas in perineal area daily and after baths and all  bowel movements. and Diabetes Care:   SN to perform and educate Diabetic management with blood glucose monitoring:, Fingerstick blood sugar AC and HS, and Report CBG < 60 or > 350 to physician.    WOUND CARE ORDERS  None    Medications: Review discharge medications with patient and family and provide education.      Current Discharge Medication List        CONTINUE these medications which have CHANGED    Details   atorvastatin (LIPITOR) 40 MG tablet Take 1 tablet (40 mg total) by mouth every evening.  Qty: 90 tablet, Refills: 3      warfarin (COUMADIN) 5 MG tablet Take 1 tablet (5 mg total) by mouth every Mon, Wed, Fri. HOLD UNTIL FOLLOW UP WITH Neurosurgery. Take 5 mg by mouth three days weekly and 7.5 mg on all other days  Qty: 90 tablet, Refills: 0           CONTINUE these medications which have NOT CHANGED    Details   acetaminophen (TYLENOL) 325 MG tablet Take 650 mg by mouth every 6 (six) hours as needed for Pain.      aspirin (ECOTRIN) 81 MG EC tablet Take 1 tablet by mouth once daily.      atenoloL (TENORMIN) 50 MG tablet Take 50 mg by mouth once daily.      furosemide (LASIX) 20 MG tablet Take 20 mg by mouth once daily.      gabapentin (NEURONTIN) 300 MG capsule Take 300-900 mg by mouth daily      HYDROcodone-acetaminophen (NORCO)  mg per tablet Take 1 tablet by mouth every 8 (eight) hours as needed for Pain.      liraglutide 0.6 mg/0.1 mL, 18 mg/3 mL, subq PNIJ (VICTOZA 2-SEDRICK) 0.6 mg/0.1 mL (18 mg/3 mL) PnIj pen Inject 0.6 mg into the skin once daily.      metFORMIN (GLUCOPHAGE) 500 MG tablet Take 500 mg by mouth 3 (three) times daily.      pantoprazole (PROTONIX) 40 MG tablet Take 40 mg by mouth once daily.      pioglitazone (ACTOS) 15 MG tablet Take 15 mg by mouth once daily.      rOPINIRole (REQUIP) 1 MG tablet Take 1 mg by mouth every evening.           STOP taking these medications       docusate sodium (COLACE) 250 MG capsule Comments:   Reason for Stopping:         losartan-hydrochlorothiazide  100-25 mg (HYZAAR) 100-25 mg per tablet Comments:   Reason for Stopping:                  Immunizations Administered as of 10/1/2022       Name Date Dose VIS Date Route Exp Date    COVID-19, MRNA, LN-S, PF (Moderna) 3/1/2021 0.5 mL -- -- --            This patient has had both covid vaccinations    Some patients may experience side effects after vaccination.  These may include fever, headache, muscle or joint aches.  Most symptoms resolve with 24-48 hours and do not require urgent medical evaluation unless they persist for more than 72 hours or symptoms are concerning for an unrelated medical condition.          _________________________________  Harjinder Field MD  10/01/2022

## 2022-10-01 NOTE — ASSESSMENT & PLAN NOTE
Patient is a 77M with PMH of HTN, DM, polio, CAD (on ASA/Coumadin with pacemaker) who presents with multiple falls (including this morning). Patient was transferred from OSH for NSGY for possible brain mass.    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   --CTH with contrast 9/20: interventricular diffuse enhancing mass in lateral vents and 3rd vent   --CT chest/abdo/pelvis 9/21: 1.1 cm solid solitary RUL nodule, additional micronodule, hepatosplenomegaly, nonsepcific lucent lesions in L ilum, L3 and L4   --Cannot do MRI - pacemaker MRI compatible, but leads not   --Repeat CTH w wo con 9/26 shows smaller lesion with less contrast enhancement   --recommend repeat CTH w wo contrast on next Monday  --Follow up LP results, pending flow cytology   -CSF 9/28: r16, 2; w35 64 (lymphocytes 96, 96); g178; p291; Cx NGTD   -flow cytometry negative, cytology pending   -no acute surgical intervention warranted currently   -consider biopsy (needle biopsy v. endoscopic biopsy) if necessary based on cytology results   --SBP <160  --Na >135  --Dex 4q6 - can taper off dex now  --PUD PPx while steroid treatment ongoing  --hold coumadin given recent blood on CTH - rec follow up with cardiology for anticoagulation/anti-platelet given CAD  --Recommend PT/OT daily  --Continue to monitor clinically, notify NSGY immediately with any changes in neuro status    Dispo: pending IPR placement per  team, follow up in neurosurgery clinic if discharged

## 2022-10-01 NOTE — DISCHARGE SUMMARY
"Charlie Bryan - Neurosurgery ([facility])  [facility] Medicine  Discharge Summary      Patient Name: Kieran Styles  MRN: [id number]  Patient Class: IP- Inpatient  Admission Date: 9/20/2022  Hospital Length of Stay: 11 days  Discharge Date and Time:  10/01/2022 3:44 PM  Attending Physician: Moises Mcbride MD   Discharging Provider: Sarah Llanos MD  Primary Care Provider: Sony Holt DO  [facility] Medicine Team: [facility] MED 3 Sarah Llanos MD    HPI:   76 yo PMHx DM2, CAD s/p CABG x 2 1997, history of adenomatous colon polyps (07/2017) revealing 10 mm sessile serrated adenoma, GERD, multiple pacemaker replacements most recent 2021 for AF (on warfarin), HTN, HLD, chronic back pain, MARQUIS on CPAP presenting to [facility] from [facility] due to a 2 week history of sudden memory deficits, disorientation, confsuion (date, situation, short-term memory), and multiple falls w/o head trauma or LOC or seizure. Wife noticed that he was forgetting where he was, what he had been doing. In addition, he has been getting weaker and more unstable on his feet. Yesterday evening, he fell while going to the bathroom and hit his elbow. Transported to EMS to [facility], where they did a CT head 09/20 this AM. Was discharged from [facility] because they initially told the wife that the CT scan was "clear", wife brought him to PCP and cardiologist OP office later that morning, but was called back by [facility] stating that they had mis-read the CT head and that the CT head was remarkable for apparent mass in brain.  Patient was subsequently transferred via EMS to [facility].  [facility] staff informed the wife that they were concerned for possible cancer, and wanted him to see neurosurgery evaluation.  Patient wife denies history cancer to her knowledge.  Patient is an active smoker smoking 1-2 packs per day, and has smoked for the last 30 years.  Social alcohol use, no other " illicit substances.    On my initial evaluation, patient is conversational; however, he is only oriented to self and time.  He exhibits short-term memory loss, and is not sure why he is here.  Overall, patient is a poor historian.  Family was not at bedside.  This history of present illness was obtained via telephone conversation with his wife ([phone] Donna Styles).  ROS includes denies chest pain, abdominal pain, nausea, vomiting, shortness of breath, cough, diarrhea, constipation, blood per rectum, fevers, chills, muscle aches, new weakness, new sensory changes.  Patient reports history of chronic lower back pain.    ED course:  Neurosurgery evaluated at [facility], concerns for metastasis.  No plans for surgical intervention at this time.  Subsequently pan CT scan pending.    PMHx/PSHx:  DM2, CAD s/p CABG x 2 , multiple pacemaker replacements most recent  for AF (on warfarin), HTN, HLD, chronic back pain, MARQUIS on CPAP    FMHx: Mother with breast cancer, . Sister NAFLD w/ cirrhosis, .      Procedure(s) (LRB):  Lumbar Puncture (N/A)      Hospital Course:   Patient presenting with sudden memory deficits, AMS, and multiple falls who was transferred from [facility] [place] NSGY evaluation of the CT head () finding of a brain mass concerning for cancer. No plans for surgical intervention at this time. CT demonstrated a pulmonary nodule, bony lesions in left ilium, L3 and L4 vertebral bodies concerning for metastasis. Patient's pacemaker leads are not compatible with MRI which is limiting imaging workup of the mass. NSGY recommended course of dexamethasone wityh follow up CT head on  to assess for interval change. ID consulted to rule out possible infectious etiology, brain mass with pulmonary nodule likely 2/2 malignancy given h/o significant smoking and recommended outpatient IR bx of pulmonary nodule in addition to further infectious w/u. LP with IR completed on ,  CSF studies remarkable for lymphocytic pleocytosis, elevated protein, glucose, and negative flow cytometry. Pending cytology results from LP then NSGY will proceed with determining if a surgical plan is necessary. CT head per NSGY planned for early next week. NSGY recommended holding anticoagulation due to high risk of intracranial bleed 2/2 small foci of hemorrhage currently located within the brain mass. The risks and benefits of holding anticoagulation in this patient with paroxysmal atrial fibrillation were discussed with the patient/family who voiced understanding and agreed with the plan. Patient medically stable for discharge to inpatient rehab, remainder of workup to be done outpatient with NSGY and Heme/Onc follow up.        Interval History: NAEON. Patient to be discharged today to inpatient rehab facility.    Review of Systems   Constitutional:  Negative for chills and fever.   HENT:  Negative for rhinorrhea, sneezing, sore throat and trouble swallowing.    Respiratory:  Negative for cough and shortness of breath.    Cardiovascular:  Negative for chest pain and palpitations.   Gastrointestinal:  Negative for diarrhea, nausea and vomiting.   Genitourinary:  Negative for dysuria, frequency and urgency.   Musculoskeletal:  Positive for back pain. Negative for myalgias.   Skin:  Negative for rash and wound.   Neurological:  Negative for syncope, facial asymmetry, speech difficulty, weakness, light-headedness and headaches.   Psychiatric/Behavioral:  Positive for agitation, confusion and decreased concentration.    Objective:     Vital Signs (Most Recent):  Temp: 97.6 °F (36.4 °C) (10/01/22 1247)  Pulse: 71 (10/01/22 1445)  Resp: 18 (10/01/22 1449)  BP: (!) 112/59 (10/01/22 1445)  SpO2: 97 % (10/01/22 1445) Vital Signs (24h Range):  Temp:  [96.2 °F (35.7 °C)-97.8 °F (36.6 °C)] 97.6 °F (36.4 °C)  Pulse:  [66-72] 71  Resp:  [16-18] 18  SpO2:  [93 %-98 %] 97 %  BP: (109-138)/(56-68) 112/59     Weight: 86.2 kg  (190 lb)  Body mass index is 25.77 kg/m².    Intake/Output Summary (Last 24 hours) at 10/1/2022 1501  Last data filed at 10/1/2022 1239  Gross per 24 hour   Intake 610 ml   Output --   Net 610 ml        Physical Exam  Vitals and nursing note reviewed.   Constitutional:       General: He is not in acute distress.     Appearance: He is obese. He is not ill-appearing or diaphoretic.   HENT:      Right Ear: External ear normal.      Left Ear: External ear normal.      Nose: Nose normal.   Eyes:      General:         Right eye: No discharge.         Left eye: No discharge.      Conjunctiva/sclera: Conjunctivae normal.   Cardiovascular:      Rate and Rhythm: Normal rate and regular rhythm.      Heart sounds: Normal heart sounds. No murmur heard.  Pulmonary:      Effort: Pulmonary effort is normal. No respiratory distress.      Breath sounds: Normal breath sounds. No wheezing or rales.   Abdominal:      General: Abdomen is flat. There is no distension.      Palpations: There is no mass.      Tenderness: There is no abdominal tenderness.   Musculoskeletal:         General: No swelling, tenderness or deformity. Normal range of motion.      Cervical back: Normal range of motion and neck supple.      Right lower leg: No edema.      Left lower leg: No edema.   Skin:     General: Skin is warm.      Coloration: Skin is not jaundiced.      Findings: No bruising.   Neurological:      Mental Status: He is alert.      Cranial Nerves: No cranial nerve deficit.      Motor: Weakness (left sided, LUE weak 2/2 h/o Polio) present.      Comments: Oriented to person and year but not to place  Poor memory recall       Significant Labs: All pertinent labs within the past 24 hours have been reviewed.    Significant Imaging: I have reviewed all pertinent imaging results/findings within the past 24 hours.        Goals of Care Treatment Preferences:  Code Status: Full Code      Consults:   Consults (From admission, onward)          Status Ordering  Provider     Inpatient consult to Midline team  Once        Provider:  (Not yet assigned)    Completed JULIEN WELLINGTON     Inpatient consult to Physical Medicine Rehab  Once        Provider:  (Not yet assigned)    Completed JULIEN WELLINGTON     Inpatient consult to Infectious Diseases  Once        Provider:  (Not yet assigned)    Completed SLOE FELICIANO     Inpatient consult to Neurosurgery  Once        Provider:  (Not yet assigned)    Completed JUANCARLOS EPSTEIN            * Brain mass  77-year-old male with extensive past medical history presenting from [facility] for Neurosurgery evaluation of a brain mass found on CT head.  Per patient wife, patient has had sudden memory problems in the last 2 weeks with recurrent falls, but without loss of consciousness, seizure activity, head trauma.  Baseline is normal without other limitations.  Patient also has been describing 1 year of headache.  Has seen neurology outpatient, and has been taking gabapentin and undergoing nerve block procedures in neck with little effect. 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     Concerns for metastatic disease, possibly lung as primary given extensive smoking history.    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     Patient's pacemaker leads are NOT compatible with MRI    - negative toxoplasma, cryptococcus, HIV, and RPR    LP (9/28):  - elevated CSF protein, glucose, lymphocytes  - negative flow cytometry  - pending cytology    Plan:   -- NSGY evaluated, pending  recs in regards to surgical intervention  -- completed course of dexamethasone and taper  -- PUD PPx during steroids, patient on home pantoprazole 40mg qd  -- NSGY recommended holding AC due to high risk of intracranial bleed 2/2 small foci of hemorrhage, risks and benefits of holding AC in this patient with paroxysmal atrial fibrillation were discussed with the patient/family who voiced understanding and agreed with the plan  -- plan for outpatient CT head early next week, close follow up with NSGY to manage surgical intervention  -- ID consulted to r/o infectious etiology of mass   - f/u immunodeficiency panel    - IR fluoro guided LP on 9/28   - pulmonary nodule bx per IR can be done outpatient with studies (pathology, cytology, aerobic/anaerobic/fungal/AFB cultures)  -- follow up cytology  -- zyprexa 10mg nightly for agitation, 5mg BID PRN for breakthrough  -- Neuro check q4h  -- Fall, Aspiration, Delirium precautions    Atrial fibrillation  Chronic anticoagulation use w/ warfarin  S/P pacemaker with multiple replacements    Home meds: Warfarin 7.5 qd, atenolol 50 qd  [id number]      NSGY recommended holding AC due to high risk of intracranial bleed 2/2 small foci of hemorrhage, risks and benefits of holding AC in this patient with paroxysmal atrial fibrillation were discussed with the patient/family who voiced understanding and agreed with the plan    -- Holding home warfarin until NSGY follow-up  -- h/o CVA while off AC for a past colonoscopy  -- Continue home ASA, statin, and atenolol  -- Daily INR   -- Cardiac telemetry  -- Maintain K > 4, Mg > 2, Ca wnl; replete PRN  -- STAT cardiology consult if hemodynamic instability for DCCV evaluation      Type 2 diabetes mellitus, without long-term current use of insulin  Patient's FSGs are controlled on current medication regimen. Hyperglycemia likely 2/2 steroids. Will discharge on home regiment.   Last A1c reviewed-   Lab Results   Component Value Date    HGBA1C  6.7 (H) 09/20/2022     Most recent fingerstick glucose reviewed-   Recent Labs   Lab 09/30/22  1654 09/30/22  2049 10/01/22  0724 10/01/22  1123   POCTGLUCOSE 214* 332* 151* 233*     Current correctional scale  Medium  Maintain anti-hyperglycemic dose as follows-   Antihyperglycemics (From admission, onward)      Start     Stop Route Frequency Ordered    09/29/22 2100  insulin detemir U-100 pen 19 Units         -- SubQ Nightly 09/29/22 1529    09/29/22 1645  insulin aspart U-100 pen 12 Units         -- SubQ 3 times daily with meals 09/29/22 1529    09/20/22 2250  insulin aspart U-100 pen 1-10 Units         -- SubQ Before meals & nightly PRN 09/20/22 2152        Home medications: metformin, pioglitazone, Victoza    -- Hold oral hyperglycemics  -- SSI, POCT BG qACHS; titrate basal/bolus regimen PRN to 140-180 goal      Final Active Diagnoses:    Diagnosis Date Noted POA    PRINCIPAL PROBLEM:  Brain mass [G93.89] 09/20/2022 Unknown    Atrial fibrillation [I48.91] 09/20/2022 Unknown    Dermatitis associated with moisture [L30.8] 09/22/2022 Yes    Type 2 diabetes mellitus, without long-term current use of insulin [E11.9] 09/20/2022 Unknown    Coronary artery disease involving coronary bypass graft [I25.810] 09/20/2022 Unknown    Hyperlipidemia [E78.5] 09/20/2022 Unknown    Pacemaker [Z95.0] 09/20/2022 Unknown    MARQUIS on CPAP [G47.33, Z99.89] 09/20/2022 Not Applicable    Hypertension [I10] 09/20/2022 Unknown    Chronic back pain [M54.9, G89.29] 09/20/2022 Unknown    GERD (gastroesophageal reflux disease) [K21.9] 09/20/2022 Unknown    Restless leg [G25.81] 09/20/2022 Unknown    COPD (chronic obstructive pulmonary disease) [J44.9] 09/20/2022 Unknown      Problems Resolved During this Admission:    Diagnosis Date Noted Date Resolved POA    Constipation [K59.00] 09/27/2022 09/30/2022 Unknown       Discharged Condition: fair    Disposition: Rehab Facility    Follow Up:   Follow-up Information       Lex Marin MD Follow up.     Specialty: Neurosurgery  Contact information:  [address]  [facility] [address]  [phone]                           Patient Instructions:      CT Head W Wo Contrast   Standing Status: Future Standing Exp. Date: 10/01/23   Order Comments: Include stealth please   Scheduling Instructions: Include stealth please     Order Specific Question Answer Comments   Is the patient allergic to iodine or contrast? No    History of Kidney Disease - including: decreased kidney function, dialysis, kidney transplay, single kidney, kidney cancer, kidney surgery? None    May the Radiologist modify the order per protocol to meet the clinical needs of the patient? Yes        Significant Diagnostic Studies: Radiology: CT scan:  head    Pending Diagnostic Studies:       Procedure Component Value Units Date/Time    Autoinflammatory Primary Immunodeficiency Gene Panel [[id number]] Collected: 09/24/22 2039    Order Status: Sent Lab Status: In process Updated: 09/24/22 2044    Specimen: Blood     Cytology, Fluid/Wash/Brush [[id number]] Collected: 09/28/22 1103    Order Status: Sent Lab Status: In process Updated: 09/29/22 0949    Specimen: Body Fluid     Freeze and Hold,  [[id number]] Collected: 09/28/22 1103    Order Status: Sent Lab Status: No result     Specimen: CSF (Spinal Fluid) from Cerebrospinal Fluid            Medications:  Reconciled Home Medications:      Medication List        CHANGE how you take these medications      atorvastatin 40 MG tablet  Commonly known as: LIPITOR  Take 1 tablet (40 mg total) by mouth every evening.  What changed:   medication strength  how much to take     warfarin 5 MG tablet  Commonly known as: COUMADIN  Take 1 tablet (5 mg total) by mouth every Mon, Wed, Fri. HOLD UNTIL FOLLOW UP WITH Neurosurgery. Take 5 mg by mouth three days weekly and 7.5 mg on all other days  Start taking on: October 3, 2022  What changed:   how much to take  how to take this  when to take this  additional  instructions            CONTINUE taking these medications      acetaminophen 325 MG tablet  Commonly known as: TYLENOL  Take 650 mg by mouth every 6 (six) hours as needed for Pain.     aspirin 81 MG EC tablet  Commonly known as: ECOTRIN  Take 1 tablet by mouth once daily.     atenoloL 50 MG tablet  Commonly known as: TENORMIN  Take 50 mg by mouth once daily.     furosemide 20 MG tablet  Commonly known as: LASIX  Take 20 mg by mouth once daily.     gabapentin 300 MG capsule  Commonly known as: NEURONTIN  Take 300-900 mg by mouth daily     HYDROcodone-acetaminophen  mg per tablet  Commonly known as: NORCO  Take 1 tablet by mouth every 8 (eight) hours as needed for Pain.     metFORMIN 500 MG tablet  Commonly known as: GLUCOPHAGE  Take 500 mg by mouth 3 (three) times daily.     pantoprazole 40 MG tablet  Commonly known as: PROTONIX  Take 40 mg by mouth once daily.     pioglitazone 15 MG tablet  Commonly known as: ACTOS  Take 15 mg by mouth once daily.     rOPINIRole 1 MG tablet  Commonly known as: REQUIP  Take 1 mg by mouth every evening.     VICTOZA 2-SEDRICK 0.6 mg/0.1 mL (18 mg/3 mL) Pnij pen  Generic drug: liraglutide 0.6 mg/0.1 mL (18 mg/3 mL) subq PNIJ  Inject 0.6 mg into the skin once daily.            STOP taking these medications      docusate sodium 250 MG capsule  Commonly known as: COLACE     losartan-hydrochlorothiazide 100-25 mg 100-25 mg per tablet  Commonly known as: HYZAAR              Indwelling Lines/Drains at time of discharge:   Lines/Drains/Airways       None                   Time spent on the discharge of patient: 60 minutes         Sarah Llanos MD  Department of Hospital Medicine  [facility] Neurosurgery ([facility])

## 2022-10-01 NOTE — SUBJECTIVE & OBJECTIVE
Interval History: 10/1: No acute events. Neuro stable. CSF cytology still pending. Repeat CTH on Monday. Pending discharge to [facility] likely Monday per wife.    Medications:  Continuous Infusions:  Scheduled Meds:   aspirin  81 mg Oral Daily    atenoloL  50 mg Oral Daily    atorvastatin  40 mg Oral QHS    bisacodyL  10 mg Rectal Daily    dexamethasone  4 mg Intravenous Q12H    enoxaparin  1 mg/kg Subcutaneous BID    fluticasone furoate-vilanteroL  1 puff Inhalation Daily    furosemide  20 mg Oral Daily    insulin aspart U-100  12 Units Subcutaneous TIDWM    insulin detemir U-100  19 Units Subcutaneous QHS    LIDOcaine  1 patch Transdermal Q24H    nicotine  1 patch Transdermal Daily    OLANZapine  10 mg Oral QHS    pantoprazole  40 mg Oral Daily    polyethylene glycol  17 g Oral BID    potassium, sodium phosphates  2 packet Oral Q4H    rOPINIRole  1 mg Oral QHS    senna-docusate 8.6-50 mg  2 tablet Oral Daily     PRN Meds:acetaminophen, albuterol-ipratropium, aluminum-magnesium hydroxide-simethicone, dextrose 10%, dextrose 10%, glucagon (human recombinant), glucose, glucose, HYDROcodone-acetaminophen, hydrOXYzine HCL, insulin aspart U-100, melatonin, naloxone, OLANZapine, sodium chloride 0.9%, sodium chloride 0.9%     Review of Systems  Objective:     Weight: 86.2 kg (190 lb)  Body mass index is 25.77 kg/m².  Vital Signs (Most Recent):  Temp: 97.6 °F (36.4 °C) (10/01/22 0727)  Pulse: 68 (10/01/22 0821)  Resp: 18 (10/01/22 0821)  BP: 131/61 (10/01/22 0727)  SpO2: 98 % (10/01/22 0821) Vital Signs (24h Range):  Temp:  [96.2 °F (35.7 °C)-98.1 °F (36.7 °C)] 97.6 °F (36.4 °C)  Pulse:  [66-72] 68  Resp:  [16-20] 18  SpO2:  [93 %-98 %] 98 %  BP: ()/(55-68) 131/61                            Physical Exam    Neurosurgery Physical Exam  General: AOx1-2, GCS E4V4M6, fluctuating confusion/impaired memory  CNII-XII: Intact on fine exam, PERRL, visual fields grossly intact, EOMI, facial sensation preserved, no facial asymmetry,  tongue midline, shoulder shrug equal, no pronator drift  Extremities: LUE contracted at baseline (4/5 motor), 5/5 motor otherwise, sensorium intact throughout, coordination intact throughout, DTRs 2+, no pathological reflexes, no sensory level present    Sacral wound/dermatitis    Significant Labs:  Recent Labs   Lab 09/30/22  0513 10/01/22  0406   * 150*    135*   K 3.9 4.0    102   CO2 23 24   BUN 36* 31*   CREATININE 1.0 0.9   CALCIUM 9.1 8.9   MG 1.9 2.0       Recent Labs   Lab 09/30/22  0513 10/01/22  0406   WBC 16.24* 14.45*   HGB 14.9 15.7   HCT 46.1 47.7    171       Recent Labs   Lab 09/30/22  0513 10/01/22  0406   INR 1.3* 1.1       Microbiology Results (last 7 days)       Procedure Component Value Units Date/Time    CSF culture [[id number]] Collected: 09/28/22 1103    Order Status: Completed Specimen: CSF (Spinal Fluid) from CSF Tap, Tube 3 Updated: 10/01/22 0756     CSF CULTURE No Growth to date     Gram Stain Result No WBC's      No organisms seen    Blood culture [[id number]] Collected: 09/23/22 1546    Order Status: Completed Specimen: Blood from Peripheral, Left Arm Updated: 09/28/22 1812     Blood Culture, Routine No growth after 5 days.    Blood culture [[id number]] Collected: 09/23/22 1546    Order Status: Completed Specimen: Blood from Peripheral, Right Arm Updated: 09/28/22 1812     Blood Culture, Routine No growth after 5 days.    Gram stain [[id number]] Collected: 09/28/22 1103    Order Status: Sent Specimen: CSF (Spinal Fluid) from CSF Tap, Tube 3 Updated: 09/28/22 1116          All pertinent labs from the last 24 hours have been reviewed.    Significant Diagnostics:  I have reviewed all pertinent imaging results/findings within the past 24 hours.

## 2022-10-01 NOTE — PLAN OF CARE
"Update - 3:40 PM  CM received call from Roxanne at [facility]. Bed is now vacant and room is being cleaned. RN can call updated report and ok to transport patient. EDMUNDO notified Donn with [facility] dispatch, patient/destination now ready to go.    Update - 11:38 AM  CM received a call from Roxanne, charge/supervisor at [facility]. Requested later transportation due to current patient in room not being picked up until 4 PM. CM spoke with Donn at [facility] dispatch and changed status to "will call". Facility will contact CM when current patient has left. Team aware.      EDMUNDO spoke with Lakesha with [facility] ([phone]), she confirmed bed availability and will admit today (arrive by 4:00 PM). Requested CM to contact Frantz onsite ([phone]) to update on transport ETA. Uploaded orders into [facility].    CM arranged stretcher transport via [facility], requested pickup time 12 noon. CM updated bedside nurse, Alicja and Dr. Field on status of discharge plan.       Zahira Oviedo RN  Weekend  - [facility]  [phone]     "

## 2022-10-01 NOTE — PLAN OF CARE
Contact made with [facility] liaison for pt transfer.     Provided call report numbers for nurse to receive report:          MALACHI messaged tx team to request FTO. Requested updates on orders to include pt need for CT scan early next week as week as hem onc and neurosurgery.     Requests passed along to rehab liaison who confirmed they would be completed.     Liaison stated she spoken with EMDUNDO Oviedo who has set up transport to facility and was also in communication with MD.     No further needs from this writer.    Juana Noel, MARILEE  Case Management Social Worker   [facility] [address]

## 2022-10-01 NOTE — PROGRESS NOTES
Charlie Bryan - Neurosurgery ([facility])  Neurosurgery  Progress Note    Subjective:     History of Present Illness: Patient is a 77M with PMH of HTN, DM, polio, CAD (on ASA/Coumadin) who presents with multiple falls (including this morning). Patient was transferred from OSH for NSGY for possible brain mass.    Patient has a pacemaker so it is uncertain whether or not he can get an MRI. He is originally from [facility] and was transferred here for care. Patient denies head trauma with his fall this morning. He notes that he has started using a walker in the past few weeks. Patient complains of L leg pain, likely related to his fall. 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      Post-Op Info:  Procedure(s) (LRB):  Lumbar Puncture (N/A)   3 Days Post-Op     Interval History: 10/1: No acute events. Neuro stable. CSF cytology still pending. Repeat CTH on Monday. Pending discharge to [facility] likely Monday per wife.    Medications:  Continuous Infusions:  Scheduled Meds:   aspirin  81 mg Oral Daily    atenoloL  50 mg Oral Daily    atorvastatin  40 mg Oral QHS    bisacodyL  10 mg Rectal Daily    dexamethasone  4 mg Intravenous Q12H    enoxaparin  1 mg/kg Subcutaneous BID    fluticasone furoate-vilanteroL  1 puff Inhalation Daily    furosemide  20 mg Oral Daily    insulin aspart U-100  12 Units Subcutaneous TIDWM    insulin detemir U-100  19 Units Subcutaneous QHS    LIDOcaine  1 patch Transdermal Q24H    nicotine  1 patch Transdermal Daily    OLANZapine  10 mg Oral QHS    pantoprazole  40 mg Oral Daily    polyethylene glycol  17 g Oral BID    potassium, sodium phosphates  2 packet Oral Q4H    rOPINIRole  1 mg Oral QHS    senna-docusate 8.6-50 mg  2 tablet Oral Daily     PRN Meds:acetaminophen, albuterol-ipratropium, aluminum-magnesium hydroxide-simethicone, dextrose 10%, dextrose 10%, glucagon (human recombinant), glucose, glucose, HYDROcodone-acetaminophen, hydrOXYzine HCL,  insulin aspart U-100, melatonin, naloxone, OLANZapine, sodium chloride 0.9%, sodium chloride 0.9%     Review of Systems  Objective:     Weight: 86.2 kg (190 lb)  Body mass index is 25.77 kg/m².  Vital Signs (Most Recent):  Temp: 97.6 °F (36.4 °C) (10/01/22 0727)  Pulse: 68 (10/01/22 0821)  Resp: 18 (10/01/22 0821)  BP: 131/61 (10/01/22 0727)  SpO2: 98 % (10/01/22 0821) Vital Signs (24h Range):  Temp:  [96.2 °F (35.7 °C)-98.1 °F (36.7 °C)] 97.6 °F (36.4 °C)  Pulse:  [66-72] 68  Resp:  [16-20] 18  SpO2:  [93 %-98 %] 98 %  BP: ()/(55-68) 131/61                            Physical Exam    Neurosurgery Physical Exam  General: AOx1-2, GCS [id number], fluctuating confusion/impaired memory  CNII-XII: Intact on fine exam, PERRL, visual fields grossly intact, EOMI, facial sensation preserved, no facial asymmetry, tongue midline, shoulder shrug equal, no pronator drift  Extremities: LUE contracted at baseline (4/5 motor), 5/5 motor otherwise, sensorium intact throughout, coordination intact throughout, DTRs 2+, no pathological reflexes, no sensory level present    Sacral wound/dermatitis    Significant Labs:  Recent Labs   Lab 09/30/22  0513 10/01/22  0406   * 150*    135*   K 3.9 4.0    102   CO2 23 24   BUN 36* 31*   CREATININE 1.0 0.9   CALCIUM 9.1 8.9   MG 1.9 2.0       Recent Labs   Lab 09/30/22  0513 10/01/22  0406   WBC 16.24* 14.45*   HGB 14.9 15.7   HCT 46.1 47.7    171       Recent Labs   Lab 09/30/22  0513 10/01/22  0406   INR 1.3* 1.1       Microbiology Results (last 7 days)       Procedure Component Value Units Date/Time    CSF culture [[id number]] Collected: 09/28/22 1103    Order Status: Completed Specimen: CSF (Spinal Fluid) from CSF Tap, Tube 3 Updated: 10/01/22 0756     CSF CULTURE No Growth to date     Gram Stain Result No WBC's      No organisms seen    Blood culture [[id number]] Collected: 09/23/22 1546    Order Status: Completed Specimen: Blood from Peripheral, Left Arm Updated:  09/28/22 1812     Blood Culture, Routine No growth after 5 days.    Blood culture [[id number]] Collected: 09/23/22 1546    Order Status: Completed Specimen: Blood from Peripheral, Right Arm Updated: 09/28/22 1812     Blood Culture, Routine No growth after 5 days.    Gram stain [[id number]] Collected: 09/28/22 1103    Order Status: Sent Specimen: CSF (Spinal Fluid) from CSF Tap, Tube 3 Updated: 09/28/22 1116          All pertinent labs from the last 24 hours have been reviewed.    Significant Diagnostics:  I have reviewed all pertinent imaging results/findings within the past 24 hours.    Assessment/Plan:     * Brain mass  Patient is a 77M with PMH of HTN, DM, polio, CAD (on ASA/Coumadin with pacemaker) who presents with multiple falls (including this morning). Patient was transferred from OSH for NSGY for possible brain mass.    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   --CTH with contrast 9/20: interventricular diffuse enhancing mass in lateral vents and 3rd vent   --CT chest/abdo/pelvis 9/21: 1.1 cm solid solitary RUL nodule, additional micronodule, hepatosplenomegaly, nonsepcific lucent lesions in L ilum, L3 and L4   --Cannot do MRI - pacemaker MRI compatible, but leads not   --Repeat CTH w wo con 9/26 shows smaller lesion with less contrast enhancement   --recommend repeat CTH w wo contrast on next Monday  --Follow up LP results, pending flow cytology   -CSF 9/28: r16, 2; w35 64 (lymphocytes 96, 96); g178; p291; Cx NGTD   -flow cytometry negative, cytology pending   -no acute surgical intervention warranted currently   -consider biopsy (needle biopsy v. endoscopic biopsy) if necessary based on cytology results   --SBP <160  --Na >135  --Dex 4q6 - can taper off dex now  --PUD PPx while steroid treatment ongoing  --hold coumadin given recent blood on CTH - rec follow up with cardiology for  anticoagulation/anti-platelet given CAD  --Recommend PT/OT daily  --Continue to monitor clinically, notify NSGY immediately with any changes in neuro status    Dispo: pending IPR placement per [facility] team, follow up in neurosurgery clinic if discharged        Bonita Fontaine MD  Neurosurgery  Charlie Bryan - Neurosurgery ([facility])

## 2022-10-01 NOTE — PLAN OF CARE
Discharge instructions given to patient and patient's wife. All questions answered. All belongings returned. Patient discharged via EMS to [facility] in [place]. Report called to RN.

## 2022-10-03 LAB
BACTERIA CSF CULT: NO GROWTH
GRAM STN SPEC: NORMAL
GRAM STN SPEC: NORMAL

## 2022-10-04 LAB
COMMENT: ABNORMAL
FINAL PATHOLOGIC DIAGNOSIS: ABNORMAL
Lab: ABNORMAL

## 2022-10-14 LAB
ANNOTATION COMMENT IMP: NORMAL
GENE MUT TESTED BLD/T: NORMAL
GENETIC VARIANT DETAILS BLD/T: NORMAL
GENETICIST REVIEW: NORMAL
MOL DX INTERP BLD/T QL: NORMAL
PROVIDER SIGNING NAME: NORMAL
REF LAB TEST METHOD: NORMAL
TEST PERFORMANCE INFO SPEC: NORMAL

## 2022-10-21 ENCOUNTER — TELEPHONE (OUTPATIENT)
Dept: NEUROSURGERY | Facility: CLINIC | Age: 77
End: 2022-10-21
Payer: MEDICARE

## 2022-10-21 NOTE — TELEPHONE ENCOUNTER
MALACHI marie, advised that Dr Marin did speak with Dr Ayala his Internist in [place],Ms. Advised the daughters to contact Dr Ayala for an update.

## 2022-10-21 NOTE — TELEPHONE ENCOUNTER
----- Message from Reese Gonzalez sent at 10/21/2022  2:25 PM CDT -----  Contact: Pt daughter Corinne  The pt daughter, Corinne, called and would like to have someone call her back    She would like an update on the patient    Corinne can be reached at [phone]

## 2024-09-20 NOTE — ASSESSMENT & PLAN NOTE
Patient is a 77M with PMH of HTN, DM, polio, CAD (on ASA/Coumadin with pacemaker) who presents with multiple falls (including this morning). Patient was transferred from OSH for NSGY for possible brain mass.    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   --CTH with contrast 9/20: interventricular diffuse enhancing mass in lateral vents and 3rd vent   --CT chest/abdo/pelvis 9/21: 1.1 cm solid solitary RUL nodule, additional micronodule, hepatosplenomegaly, nonsepcific lucent lesions in L ilum, L3 and L4   --Cannot do MRI - pacemaker MRI compatible, but leads not   --Repeat CTH w wo con 9/26 shows smaller lesion with less contrast enhancement  --F/u LP results, surgical plan pending  --SBP <160  --Na >135  --Dex 4q6  --PUD PPx while steroid treatment ongoing  --Continue to monitor clinically, notify NSGY immediately with any changes in neuro status   None